# Patient Record
Sex: FEMALE | Race: WHITE | Employment: OTHER | URBAN - METROPOLITAN AREA
[De-identification: names, ages, dates, MRNs, and addresses within clinical notes are randomized per-mention and may not be internally consistent; named-entity substitution may affect disease eponyms.]

---

## 2017-02-09 PROBLEM — D49.89 MEDIASTINAL TUMOR: Status: ACTIVE | Noted: 2017-02-09

## 2017-02-09 PROBLEM — K58.9 IBS (IRRITABLE BOWEL SYNDROME): Status: ACTIVE | Noted: 2017-02-09

## 2017-02-10 PROBLEM — N18.9 CKD (CHRONIC KIDNEY DISEASE): Status: ACTIVE | Noted: 2017-02-10

## 2017-02-10 PROBLEM — E03.9 HYPOTHYROIDISM: Status: ACTIVE | Noted: 2017-02-10

## 2017-02-10 PROBLEM — R05.3 CHRONIC COUGH: Status: ACTIVE | Noted: 2017-02-10

## 2017-02-10 PROBLEM — N30.20 CHRONIC CYSTITIS: Status: ACTIVE | Noted: 2017-02-10

## 2017-02-10 PROBLEM — E78.5 HYPERLIPIDEMIA: Status: ACTIVE | Noted: 2017-02-10

## 2017-02-10 PROBLEM — I50.9 CHF (CONGESTIVE HEART FAILURE) (HCC): Chronic | Status: ACTIVE | Noted: 2017-02-10

## 2017-02-10 PROBLEM — I10 HYPERTENSION: Status: ACTIVE | Noted: 2017-02-10

## 2017-02-10 PROBLEM — D61.9 APLASTIC ANEMIA (HCC): Status: ACTIVE | Noted: 2017-02-10

## 2017-02-10 PROBLEM — J98.4 BRONCHOGENIC CYST: Status: ACTIVE | Noted: 2017-02-10

## 2017-02-10 PROBLEM — I50.9 CHF (CONGESTIVE HEART FAILURE) (HCC): Status: ACTIVE | Noted: 2017-02-10

## 2017-02-10 PROBLEM — D46.9 MYELODYSPLASIA (MYELODYSPLASTIC SYNDROME) (HCC): Status: ACTIVE | Noted: 2017-02-10

## 2020-02-13 ENCOUNTER — APPOINTMENT (OUTPATIENT)
Dept: GENERAL RADIOLOGY | Age: 85
DRG: 481 | End: 2020-02-13
Attending: EMERGENCY MEDICINE
Payer: MEDICARE

## 2020-02-13 ENCOUNTER — HOSPITAL ENCOUNTER (EMERGENCY)
Age: 85
Discharge: OTHER INSTITUTION | DRG: 481 | End: 2020-02-13
Attending: EMERGENCY MEDICINE
Payer: MEDICARE

## 2020-02-13 ENCOUNTER — HOSPITAL ENCOUNTER (INPATIENT)
Age: 85
LOS: 5 days | Discharge: NURSING, REHAB OR LONG-TERM CARE | DRG: 481 | End: 2020-02-18
Attending: INTERNAL MEDICINE | Admitting: FAMILY MEDICINE
Payer: MEDICARE

## 2020-02-13 ENCOUNTER — APPOINTMENT (OUTPATIENT)
Dept: CT IMAGING | Age: 85
DRG: 481 | End: 2020-02-13
Attending: EMERGENCY MEDICINE
Payer: MEDICARE

## 2020-02-13 VITALS
RESPIRATION RATE: 12 BRPM | BODY MASS INDEX: 21.83 KG/M2 | SYSTOLIC BLOOD PRESSURE: 142 MMHG | HEIGHT: 65 IN | HEART RATE: 66 BPM | DIASTOLIC BLOOD PRESSURE: 64 MMHG | WEIGHT: 131 LBS | OXYGEN SATURATION: 100 % | TEMPERATURE: 98.3 F

## 2020-02-13 DIAGNOSIS — S72.002A CLOSED FRACTURE OF LEFT HIP, INITIAL ENCOUNTER (HCC): Primary | ICD-10-CM

## 2020-02-13 DIAGNOSIS — S72.002A CLOSED FRACTURE OF LEFT HIP, INITIAL ENCOUNTER (HCC): ICD-10-CM

## 2020-02-13 DIAGNOSIS — D64.9 ANEMIA, UNSPECIFIED TYPE: Primary | ICD-10-CM

## 2020-02-13 PROBLEM — I50.32 CHRONIC DIASTOLIC CONGESTIVE HEART FAILURE (HCC): Status: ACTIVE | Noted: 2020-02-13

## 2020-02-13 PROBLEM — I27.20 PULMONARY HTN (HCC): Status: ACTIVE | Noted: 2020-02-13

## 2020-02-13 PROBLEM — D46.9 MDS (MYELODYSPLASTIC SYNDROME) (HCC): Status: ACTIVE | Noted: 2020-02-13

## 2020-02-13 PROBLEM — N18.30 CKD (CHRONIC KIDNEY DISEASE) STAGE 3, GFR 30-59 ML/MIN (HCC): Status: ACTIVE | Noted: 2020-02-13

## 2020-02-13 PROBLEM — R42 DIZZINESS: Status: ACTIVE | Noted: 2020-02-13

## 2020-02-13 LAB
ALBUMIN SERPL-MCNC: 3.7 G/DL (ref 3.2–4.6)
ALBUMIN/GLOB SERPL: 1 {RATIO} (ref 1.2–3.5)
ALP SERPL-CCNC: 81 U/L (ref 50–130)
ALT SERPL-CCNC: 27 U/L (ref 12–65)
ANION GAP SERPL CALC-SCNC: 6 MMOL/L (ref 7–16)
APPEARANCE UR: ABNORMAL
AST SERPL-CCNC: 29 U/L (ref 15–37)
ATRIAL RATE: 63 BPM
BACTERIA URNS QL MICRO: ABNORMAL /HPF
BASOPHILS # BLD: 0 K/UL (ref 0–0.2)
BASOPHILS NFR BLD: 0 % (ref 0–2)
BILIRUB SERPL-MCNC: 1.4 MG/DL (ref 0.2–1.1)
BILIRUB UR QL: NEGATIVE
BUN SERPL-MCNC: 37 MG/DL (ref 8–23)
CALCIUM SERPL-MCNC: 9.9 MG/DL (ref 8.3–10.4)
CALCULATED P AXIS, ECG09: 72 DEGREES
CALCULATED R AXIS, ECG10: 45 DEGREES
CALCULATED T AXIS, ECG11: 77 DEGREES
CASTS URNS QL MICRO: ABNORMAL /LPF
CHLORIDE SERPL-SCNC: 109 MMOL/L (ref 98–107)
CO2 SERPL-SCNC: 23 MMOL/L (ref 21–32)
COLOR UR: YELLOW
CREAT SERPL-MCNC: 1.49 MG/DL (ref 0.6–1)
DIAGNOSIS, 93000: NORMAL
DIFFERENTIAL METHOD BLD: ABNORMAL
EOSINOPHIL # BLD: 0 K/UL (ref 0–0.8)
EOSINOPHIL NFR BLD: 0 % (ref 0.5–7.8)
EPI CELLS #/AREA URNS HPF: ABNORMAL /HPF
ERYTHROCYTE [DISTWIDTH] IN BLOOD BY AUTOMATED COUNT: 23 % (ref 11.9–14.6)
GLOBULIN SER CALC-MCNC: 3.8 G/DL (ref 2.3–3.5)
GLUCOSE SERPL-MCNC: 90 MG/DL (ref 65–100)
GLUCOSE UR STRIP.AUTO-MCNC: NEGATIVE MG/DL
HCT VFR BLD AUTO: 23.7 % (ref 35.8–46.3)
HGB BLD-MCNC: 7.6 G/DL (ref 11.7–15.4)
HGB UR QL STRIP: NEGATIVE
IMM GRANULOCYTES # BLD AUTO: 0.1 K/UL (ref 0–0.5)
IMM GRANULOCYTES NFR BLD AUTO: 1 % (ref 0–5)
INR PPP: 1
KETONES UR QL STRIP.AUTO: NEGATIVE MG/DL
LEUKOCYTE ESTERASE UR QL STRIP.AUTO: ABNORMAL
LYMPHOCYTES # BLD: 2.5 K/UL (ref 0.5–4.6)
LYMPHOCYTES NFR BLD: 28 % (ref 13–44)
MCH RBC QN AUTO: 32.6 PG (ref 26.1–32.9)
MCHC RBC AUTO-ENTMCNC: 32.1 G/DL (ref 31.4–35)
MCV RBC AUTO: 101.7 FL (ref 79.6–97.8)
MONOCYTES # BLD: 0.5 K/UL (ref 0.1–1.3)
MONOCYTES NFR BLD: 6 % (ref 4–12)
NEUTS SEG # BLD: 5.6 K/UL (ref 1.7–8.2)
NEUTS SEG NFR BLD: 64 % (ref 43–78)
NITRITE UR QL STRIP.AUTO: NEGATIVE
NRBC # BLD: 0.02 K/UL (ref 0–0.2)
P-R INTERVAL, ECG05: 198 MS
PH UR STRIP: 5 [PH] (ref 5–9)
PLATELET # BLD AUTO: 208 K/UL (ref 150–450)
PMV BLD AUTO: 12.8 FL (ref 9.4–12.3)
POTASSIUM SERPL-SCNC: 3.9 MMOL/L (ref 3.5–5.1)
PROT SERPL-MCNC: 7.5 G/DL (ref 6.3–8.2)
PROT UR STRIP-MCNC: NEGATIVE MG/DL
PROTHROMBIN TIME: 13.6 SEC (ref 12–14.7)
Q-T INTERVAL, ECG07: 444 MS
QRS DURATION, ECG06: 90 MS
QTC CALCULATION (BEZET), ECG08: 454 MS
RBC # BLD AUTO: 2.33 M/UL (ref 4.05–5.2)
RBC #/AREA URNS HPF: ABNORMAL /HPF
SODIUM SERPL-SCNC: 138 MMOL/L (ref 136–145)
SP GR UR REFRACTOMETRY: 1.01 (ref 1–1.02)
TROPONIN I SERPL-MCNC: <0.02 NG/ML (ref 0.02–0.05)
UROBILINOGEN UR QL STRIP.AUTO: 0.2 EU/DL (ref 0.2–1)
VENTRICULAR RATE, ECG03: 63 BPM
WBC # BLD AUTO: 8.8 K/UL (ref 4.3–11.1)
WBC URNS QL MICRO: ABNORMAL /HPF

## 2020-02-13 PROCEDURE — 71045 X-RAY EXAM CHEST 1 VIEW: CPT

## 2020-02-13 PROCEDURE — 81001 URINALYSIS AUTO W/SCOPE: CPT

## 2020-02-13 PROCEDURE — 74011250637 HC RX REV CODE- 250/637: Performed by: FAMILY MEDICINE

## 2020-02-13 PROCEDURE — 96374 THER/PROPH/DIAG INJ IV PUSH: CPT

## 2020-02-13 PROCEDURE — 96375 TX/PRO/DX INJ NEW DRUG ADDON: CPT

## 2020-02-13 PROCEDURE — 74011250636 HC RX REV CODE- 250/636: Performed by: EMERGENCY MEDICINE

## 2020-02-13 PROCEDURE — 93005 ELECTROCARDIOGRAM TRACING: CPT | Performed by: EMERGENCY MEDICINE

## 2020-02-13 PROCEDURE — 85025 COMPLETE CBC W/AUTO DIFF WBC: CPT

## 2020-02-13 PROCEDURE — 74011000302 HC RX REV CODE- 302: Performed by: FAMILY MEDICINE

## 2020-02-13 PROCEDURE — 80053 COMPREHEN METABOLIC PANEL: CPT

## 2020-02-13 PROCEDURE — 85610 PROTHROMBIN TIME: CPT

## 2020-02-13 PROCEDURE — 73552 X-RAY EXAM OF FEMUR 2/>: CPT

## 2020-02-13 PROCEDURE — 84484 ASSAY OF TROPONIN QUANT: CPT

## 2020-02-13 PROCEDURE — 99285 EMERGENCY DEPT VISIT HI MDM: CPT

## 2020-02-13 PROCEDURE — 86580 TB INTRADERMAL TEST: CPT | Performed by: FAMILY MEDICINE

## 2020-02-13 PROCEDURE — 77030036696 HC BOOT TRACT BUCKS S2SG -A

## 2020-02-13 PROCEDURE — 65270000029 HC RM PRIVATE

## 2020-02-13 PROCEDURE — 70450 CT HEAD/BRAIN W/O DYE: CPT

## 2020-02-13 PROCEDURE — 73502 X-RAY EXAM HIP UNI 2-3 VIEWS: CPT

## 2020-02-13 RX ORDER — LOSARTAN POTASSIUM 25 MG/1
25 TABLET ORAL 2 TIMES DAILY
Status: DISCONTINUED | OUTPATIENT
Start: 2020-02-13 | End: 2020-02-18 | Stop reason: HOSPADM

## 2020-02-13 RX ORDER — CYANOCOBALAMIN (VITAMIN B-12) 500 MCG
2000 TABLET ORAL DAILY
Status: DISCONTINUED | OUTPATIENT
Start: 2020-02-14 | End: 2020-02-18 | Stop reason: HOSPADM

## 2020-02-13 RX ORDER — ONDANSETRON 2 MG/ML
4 INJECTION INTRAMUSCULAR; INTRAVENOUS
Status: DISCONTINUED | OUTPATIENT
Start: 2020-02-13 | End: 2020-02-14 | Stop reason: SDUPTHER

## 2020-02-13 RX ORDER — DIPHENHYDRAMINE HYDROCHLORIDE 50 MG/ML
12.5 INJECTION, SOLUTION INTRAMUSCULAR; INTRAVENOUS
Status: DISCONTINUED | OUTPATIENT
Start: 2020-02-13 | End: 2020-02-18 | Stop reason: HOSPADM

## 2020-02-13 RX ORDER — HYDROCODONE BITARTRATE AND ACETAMINOPHEN 5; 325 MG/1; MG/1
1 TABLET ORAL
Status: DISCONTINUED | OUTPATIENT
Start: 2020-02-13 | End: 2020-02-14

## 2020-02-13 RX ORDER — NITROGLYCERIN 0.4 MG/1
0.4 TABLET SUBLINGUAL AS NEEDED
Status: DISCONTINUED | OUTPATIENT
Start: 2020-02-13 | End: 2020-02-18 | Stop reason: HOSPADM

## 2020-02-13 RX ORDER — GUAIFENESIN 100 MG/5ML
81 LIQUID (ML) ORAL DAILY
Status: DISCONTINUED | OUTPATIENT
Start: 2020-02-14 | End: 2020-02-18 | Stop reason: HOSPADM

## 2020-02-13 RX ORDER — LORATADINE 10 MG/1
10 TABLET ORAL DAILY
Status: DISCONTINUED | OUTPATIENT
Start: 2020-02-14 | End: 2020-02-18 | Stop reason: HOSPADM

## 2020-02-13 RX ORDER — SODIUM CHLORIDE 0.9 % (FLUSH) 0.9 %
5-40 SYRINGE (ML) INJECTION EVERY 8 HOURS
Status: DISCONTINUED | OUTPATIENT
Start: 2020-02-13 | End: 2020-02-18 | Stop reason: SDUPTHER

## 2020-02-13 RX ORDER — ONDANSETRON 2 MG/ML
4 INJECTION INTRAMUSCULAR; INTRAVENOUS
Status: COMPLETED | OUTPATIENT
Start: 2020-02-13 | End: 2020-02-13

## 2020-02-13 RX ORDER — NALOXONE HYDROCHLORIDE 0.4 MG/ML
0.4 INJECTION, SOLUTION INTRAMUSCULAR; INTRAVENOUS; SUBCUTANEOUS AS NEEDED
Status: DISCONTINUED | OUTPATIENT
Start: 2020-02-13 | End: 2020-02-18 | Stop reason: HOSPADM

## 2020-02-13 RX ORDER — ATORVASTATIN CALCIUM 40 MG/1
80 TABLET, FILM COATED ORAL DAILY
Status: DISCONTINUED | OUTPATIENT
Start: 2020-02-14 | End: 2020-02-14

## 2020-02-13 RX ORDER — HYDROCHLOROTHIAZIDE 12.5 MG/1
12.5 CAPSULE ORAL
Status: DISCONTINUED | OUTPATIENT
Start: 2020-02-13 | End: 2020-02-14

## 2020-02-13 RX ORDER — ACETAMINOPHEN 325 MG/1
650 TABLET ORAL
Status: DISCONTINUED | OUTPATIENT
Start: 2020-02-13 | End: 2020-02-18 | Stop reason: HOSPADM

## 2020-02-13 RX ORDER — PANTOPRAZOLE SODIUM 40 MG/1
40 TABLET, DELAYED RELEASE ORAL
Status: DISCONTINUED | OUTPATIENT
Start: 2020-02-14 | End: 2020-02-14

## 2020-02-13 RX ORDER — MORPHINE SULFATE 2 MG/ML
2 INJECTION, SOLUTION INTRAMUSCULAR; INTRAVENOUS
Status: COMPLETED | OUTPATIENT
Start: 2020-02-13 | End: 2020-02-13

## 2020-02-13 RX ORDER — SODIUM CHLORIDE 0.9 % (FLUSH) 0.9 %
5-40 SYRINGE (ML) INJECTION AS NEEDED
Status: DISCONTINUED | OUTPATIENT
Start: 2020-02-13 | End: 2020-02-18 | Stop reason: SDUPTHER

## 2020-02-13 RX ORDER — MORPHINE SULFATE 2 MG/ML
1 INJECTION, SOLUTION INTRAMUSCULAR; INTRAVENOUS
Status: DISCONTINUED | OUTPATIENT
Start: 2020-02-13 | End: 2020-02-14

## 2020-02-13 RX ORDER — MONTELUKAST SODIUM 10 MG/1
10 TABLET ORAL DAILY
Status: DISCONTINUED | OUTPATIENT
Start: 2020-02-14 | End: 2020-02-18 | Stop reason: HOSPADM

## 2020-02-13 RX ORDER — LEVOTHYROXINE SODIUM 50 UG/1
50 TABLET ORAL
Status: DISCONTINUED | OUTPATIENT
Start: 2020-02-14 | End: 2020-02-18 | Stop reason: HOSPADM

## 2020-02-13 RX ADMIN — Medication 5 ML: at 22:05

## 2020-02-13 RX ADMIN — TUBERCULIN PURIFIED PROTEIN DERIVATIVE 5 UNITS: 5 INJECTION, SOLUTION INTRADERMAL at 18:35

## 2020-02-13 RX ADMIN — LOSARTAN POTASSIUM 25 MG: 25 TABLET ORAL at 19:47

## 2020-02-13 RX ADMIN — SODIUM CHLORIDE 1000 ML: 900 INJECTION, SOLUTION INTRAVENOUS at 15:58

## 2020-02-13 RX ADMIN — MORPHINE SULFATE 2 MG: 2 INJECTION, SOLUTION INTRAMUSCULAR; INTRAVENOUS at 15:58

## 2020-02-13 RX ADMIN — Medication 5 ML: at 18:36

## 2020-02-13 RX ADMIN — ONDANSETRON 4 MG: 2 INJECTION INTRAMUSCULAR; INTRAVENOUS at 15:58

## 2020-02-13 RX ADMIN — HYDROCODONE BITARTRATE AND ACETAMINOPHEN 1 TABLET: 5; 325 TABLET ORAL at 22:52

## 2020-02-13 NOTE — PROGRESS NOTES
TRANSFER - IN REPORT:    Verbal report received from Laina Beckett RN(name) on Birdie Bartlett  being received from [facility] ED(unit) for routine progression of care      Report consisted of patients Situation, Background, Assessment and   Recommendations(SBAR). Information from the following report(s) SBAR, Kardex, ED Summary, MAR and Accordion was reviewed with the receiving nurse. Opportunity for questions and clarification was provided. Assessment completed upon patients arrival to unit and care assumed.

## 2020-02-13 NOTE — H&P
Hospitalist H&P Note     Admit Date:  No admission date for patient encounter. Name:  Daniel Barr   Age:  82 y.o.  :  1935   MRN:  [id number]   PCP:  Corey Herrera MD  Treatment Team: Attending Provider: Oscar Howard MD  Dizziness, history of fall, left hip pain  HPI:   16-year-old  female patient with a known history of multiple dysplastic syndrome on ARANESP every 2 to 3 weeks, recent dose today at the infusion clinic, known history of bronchogenic cyst causing chronic cough, recent echo on 2020 showed severe pulmonary hypertension and grade 2 diastolic dysfunction with pseudonormalization, hypertension, hyperlipidemia, hypothyroidism, history of peptic ulcer, CKD stage III, chronic allergies and chronic back pain/lumbar spinal stenosis. Of note recent pneumonia about 4 to 6 weeks prior. Patient was at the infusion clinic, got her dose of Aranesp, got up walked about 10-15 steps, suddenly started this dizzy feeling, spinning sensation, had a fall, fell on her left hip, complaining of severe pain unable to get up on her own. Patient was brought to emergency room for further evaluation. Patient otherwise did not complain of any headache or any chest pain or any shortness of breath or any nausea vomiting abdominal pain. Does not complain of any similar episodes prior. Few weeks ago noticed sudden giveaway of her left lower extremity but no dizziness. EKG sinus rhythm with premature atrial complexes. Troponin 0.02, creatinine of 1.49, GFR of 36, total bili of 1.4. CT head no acute etiology. Chest x-ray-Nonspecific mild diffuse interstitial prominence  X-ray left hip-  IMPRESSION  Impression: Findings suspicious for an acute nondisplaced left femoral neck  subcapital fracture, although not seen on all views. If clinically indicated,  consider MRI of the left hip for further evaluation.     ER physician spoke to Dr. Thomas Bain orthopedic physician on call, patient will be transferred to [facility] for further work-up for left hip fracture. 10 systems reviewed and negative except as noted in HPI. Past Medical History:   Diagnosis Date    Aplastic anemia ([facility] 2/10/2017    Asthma     Bronchogenic cyst 2/10/2017    --DR Kunal Bui    CHF (congestive heart failure) (HCC) 2/10/2017    Chronic cough 2/10/2017    Chronic cystitis 2/10/2017    --UROLOGIST - DR GALO    Hyperlipidemia 2/10/2017    Hypertension 2/10/2017    Hypothyroidism 2/10/2017    IBS (irritable bowel syndrome) 2/9/2017    --DIVERTICULOSIS - [facility]  - DR Dayron Coronado    Mediastinal tumor 2/9/2017    --S/P RESECTION  - SEEN CARDIOLOGIST - STRESS TEST - NORMAL 2012    [facility] 2/10[facility]2017    --SEES HEMONC    Pneumonia     WITH MRSA / EMPYEMA /SEPSIS/ DEHYDRATION / RICHAR - RESOLVED WAS IN REHAB FOR A WHILE      Past Surgical History:   Procedure Laterality Date    HX CHOLECYSTECTOMY      HX CYST REMOVAL      MEDIASTINAL TUMOR  - BENIGN    HX HERNIA REPAIR      HX HYSTERECTOMY      HX OTHER SURGICAL Left     STAPEDECTOMY     HX TONSILLECTOMY        Allergies   Allergen Reactions    Bactrim [Sulfamethoprim] Rash    Levaquin [Levofloxacin] Palpitations    Tramadol Unknown (comments)      Social History     Tobacco Use    Smoking status: Never Smoker    Smokeless tobacco: Never Used   Substance Use Topics    Alcohol use: No      Family History   Problem Relation Age of Onset    Cancer Mother         BREAST    Heart Disease Father     Diabetes Brother     Diabetes Other         SON; DAUGHTER      Immunization History   Administered Date(s) Administered    Influenza Vaccine 10/06/2014, 10/26/2015     PTA Medications:  Cannot display prior to admission medications because the patient has not been admitted in this contact. Objective:   No data found.      No intake or output data in the 24 hours ending 02/13/20 3310 Physical Exam:  General:    Well nourished. Alert. Eyes:   Normal sclera. Extraocular movements intact. ENT:  Normocephalic, atraumatic. Moist mucous membranes  CV:   RRR. No murmur, rub, or gallop. Lungs:  CTAB. No wheezing, rhonchi, or rales. Abdomen: Soft, nontender, nondistended. Bowel sounds normal.   Extremities: Warm and dry. No cyanosis or edema. Point tenderness over the left hip region, able to do flexion at the left hip with significant pain. Normal range of movements of all other extremities . neurologic: CN II-XII grossly intact. Sensation intact. Skin:     No rashes or jaundice. Psych:  Normal mood and affect. I reviewed the labs, imaging, EKGs, telemetry, and other studies done this admission.   Data Review:   Recent Results (from the past 24 hour(s))   EKG, 12 LEAD, INITIAL    Collection Time: 02/13/20  3:34 PM   Result Value Ref Range    Ventricular Rate 63 BPM    Atrial Rate 63 BPM    P-R Interval 198 ms    QRS Duration 90 ms    Q-T Interval 444 ms    QTC Calculation (Bezet) 454 ms    Calculated P Axis 72 degrees    Calculated R Axis 45 degrees    Calculated T Axis 77 degrees    Diagnosis       Sinus rhythm with Premature atrial complexes  Otherwise normal ECG  When compared with ECG of 14-OCT-2003 14:53,  Premature atrial complexes are now Present  Confirmed by Bo Tinsley MD (), TERRY COLON ([id number]) on 2/13/2020 5:02:34 PM     TROPONIN I    Collection Time: 02/13/20  3:46 PM   Result Value Ref Range    Troponin-I, Qt. <0.02 (L) 0.02 - 0.05 NG/ML   CBC WITH AUTOMATED DIFF    Collection Time: 02/13/20  3:46 PM   Result Value Ref Range    WBC 8.8 4.3 - 11.1 K/uL    RBC 2.33 (L) 4.05 - 5.2 M/uL    HGB 7.6 (L) 11.7 - 15.4 g/dL    HCT 23.7 (L) 35.8 - 46.3 %    .7 (H) 79.6 - 97.8 FL    MCH 32.6 26.1 - 32.9 PG    MCHC 32.1 31.4 - 35.0 g/dL    RDW 23.0 (H) 11.9 - 14.6 %    PLATELET 326 105 - 888 K/uL    MPV 12.8 (H) 9.4 - 12.3 FL    ABSOLUTE NRBC 0.02 0.0 - 0.2 K/uL    DF AUTOMATED      NEUTROPHILS 64 43 - 78 %    LYMPHOCYTES 28 13 - 44 %    MONOCYTES 6 4.0 - 12.0 %    EOSINOPHILS 0 (L) 0.5 - 7.8 %    BASOPHILS 0 0.0 - 2.0 %    IMMATURE GRANULOCYTES 1 0.0 - 5.0 %    ABS. NEUTROPHILS 5.6 1.7 - 8.2 K/UL    ABS. LYMPHOCYTES 2.5 0.5 - 4.6 K/UL    ABS. MONOCYTES 0.5 0.1 - 1.3 K/UL    ABS. EOSINOPHILS 0.0 0.0 - 0.8 K/UL    ABS. BASOPHILS 0.0 0.0 - 0.2 K/UL    ABS. IMM. GRANS. 0.1 0.0 - 0.5 K/UL   METABOLIC PANEL, COMPREHENSIVE    Collection Time: 02/13/20  3:46 PM   Result Value Ref Range    Sodium 138 136 - 145 mmol/L    Potassium 3.9 3.5 - 5.1 mmol/L    Chloride 109 (H) 98 - 107 mmol/L    CO2 23 21 - 32 mmol/L    Anion gap 6 (L) 7 - 16 mmol/L    Glucose 90 65 - 100 mg/dL    BUN 37 (H) 8 - 23 MG/DL    Creatinine 1.49 (H) 0.6 - 1.0 MG/DL    GFR est AA 43 (L) >60 ml/min/1.73m2    GFR est non-AA 36 (L) >60 ml/min/1.73m2    Calcium 9.9 8.3 - 10.4 MG/DL    Bilirubin, total 1.4 (H) 0.2 - 1.1 MG/DL    ALT (SGPT) 27 12 - 65 U/L    AST (SGOT) 29 15 - 37 U/L    Alk.  phosphatase 81 50 - 130 U/L    Protein, total 7.5 6.3 - 8.2 g/dL    Albumin 3.7 3.2 - 4.6 g/dL    Globulin 3.8 (H) 2.3 - 3.5 g/dL    A-G Ratio 1.0 (L) 1.2 - 3.5     PROTHROMBIN TIME + INR    Collection Time: 02/13/20  3:46 PM   Result Value Ref Range    Prothrombin time 13.6 12.0 - 14.7 sec    INR 1.0     URINALYSIS W/ RFLX MICROSCOPIC    Collection Time: 02/13/20  4:55 PM   Result Value Ref Range    Color YELLOW      Appearance CLOUDY      Specific gravity 1.010 1.001 - 1.023      pH (UA) 5.0 5.0 - 9.0      Protein NEGATIVE  NEG mg/dL    Glucose NEGATIVE  mg/dL    Ketone NEGATIVE  NEG mg/dL    Bilirubin NEGATIVE  NEG      Blood NEGATIVE  NEG      Urobilinogen 0.2 0.2 - 1.0 EU/dL    Nitrites NEGATIVE  NEG      Leukocyte Esterase SMALL (A) NEG      WBC 20-50 0 /hpf    RBC 0-3 0 /hpf    Epithelial cells 0-3 0 /hpf    Bacteria 1+ (H) 0 /hpf    Casts 5-10 0 /lpf       All Micro Results     None          Other Studies:  Xr Hip Lt W Or Wo Pelv 2-3 Vws    Result Date: 2/13/2020  Exam:  AP pelvis and left hip, left femur radiographs History:  pain, fall, 84 years Female Comparison: None available Findings: Apparent cortical step-off is seen of the left femoral head neck junctions superiorly, although not well visualized on the crosstable lateral view, this appearance is suspicious for an acute nondisplaced left femoral neck subcapital fracture. Normal alignment, joint spaces preserved. The visualized distal left femur appears intact. Normal mineralization. Calcific atherosclerosis. No evidence of knee joint effusion. Visualized soft tissues otherwise unremarkable. Impression: Findings suspicious for an acute nondisplaced left femoral neck subcapital fracture, although not seen on all views. If clinically indicated, consider MRI of the left hip for further evaluation. Xr Femur Lt 2 V    Result Date: 2/13/2020  Exam:  AP pelvis and left hip, left femur radiographs History:  pain, fall, 84 years Female Comparison: None available Findings: Apparent cortical step-off is seen of the left femoral head neck junctions superiorly, although not well visualized on the crosstable lateral view, this appearance is suspicious for an acute nondisplaced left femoral neck subcapital fracture. Normal alignment, joint spaces preserved. The visualized distal left femur appears intact. Normal mineralization. Calcific atherosclerosis. No evidence of knee joint effusion. Visualized soft tissues otherwise unremarkable. Impression: Findings suspicious for an acute nondisplaced left femoral neck subcapital fracture, although not seen on all views. If clinically indicated, consider MRI of the left hip for further evaluation.      Ct Head Wo Cont    Result Date: 2/13/2020  Examination: CT scan of the brain without contrast. History: fall, 84 years Female PT was at [facility] getting an infusion and when she was walking out of treatment room she got lightheaded and fell. Pt c/o pain in left hip, she is unable to bear weight Technique: 5 mm axial imaging of the brain from the posterior fossa to the vertex. All CT scans at this location are performed using dose modulation techniques as appropriate to a performed exam including the following: automated exposure control; adjustment of the mA and/or kV according to patient's size (this includes techniques or standardized protocols for targeted exams where dose is matched to indication/reason for exam; i.e. extremities or head); use of iterative reconstruction technique. Comparison:  None available Findings: The brain parenchyma appears within normal limits for age. The ventricles, sulci are age-appropriate. No intracranial hemorrhage or extra-axial collection is identified. No evidence of acute infarct. No mass effect or midline shift is present. Basal cisterns are intact. The visualized paranasal sinuses and mastoid air cells are clear. The orbits, bones, and soft tissues are normal in appearance. Impression:  Normal unenhanced CT of the brain for age. No acute intracranial abnormality. Xr Chest Port    Result Date: 2/13/2020  AP chest radiograph History: cough, 84 years Female Comparison: None available Findings:   Normal cardiomediastinal silhouette. Nonspecific mild diffuse interstitial prominence. Mild subsegmental atelectasis bilateral lung bases. No evidence of pneumothorax, pleural effusion, or air space consolidation. Evidence of cholecystectomy. Visualized soft tissue and osseous structures otherwise unremarkable. Impression:   Nonspecific mild diffuse interstitial prominence.         Assessment and Plan:     Hospital Problems as of 2/13/2020 Never Reviewed          Codes Class Noted - Resolved POA    M[facility]od[facility]plastic [facility] ICD-10-CM: D46.9  ICD-9-CM: 238.75  2/13/2020 - Present Unknown        * (Principal) Closed left hip fracture ([facility] ICD-10-CM: T99.389R  ICD-9-CM: 820.8  2/13/2020 - Present Unknown        CKD (chronic kidney disease) stage 3, GFR 30-59 ml/min (HCC) ICD-10-CM: N18.3  ICD-9-CM: 585.3  2/13/2020 - Present Unknown        Pulmonary HTN (HCC) ICD-10-CM: I27.20  ICD-9-CM: 416.8  2/13/2020 - Present Unknown        Chronic diastolic congestive heart failure (HCC) ICD-10-CM: I50.32  ICD-9-CM: 428.32, 428.0  2/13/2020 - Present Unknown        Hypothyroidism ICD-10-CM: E03.9  ICD-9-CM: 244.9  2/10/2017 - Present Yes        Hyperlipidemia ICD-10-CM: E78.5  ICD-9-CM: 272.4  2/10/2017 - Present Yes        Hypertension ICD-10-CM: I10  ICD-9-CM: 401.9  2/10/2017 - Present Yes        Chronic cough ICD-10-CM: R05  ICD-9-CM: 786.2  2/10/2017 - Present Yes              PLAN:  -Dizziness-? Etiology-we will order cardiac enzymes every 4x3, had a recent echo on 1/6/2020 hence will hold off on the echo. We will also consulted cardiology for surgical clearance.  -History of fall-left hip fracture-Dr. Shayne Barnes was consulted by the ER physician, PRN pain medication, further recommendations as per Dr. Shayne Barnes. -CKD stage III-looking at her previous kidney functions in care everywhere, at her baseline, will hold hydrochlorothiazide, continue losartan. -Recent echo on 1/6/2020 with grade 2 diastolic dysfunction and severe pulmonary hypertension.  -Myelodysplastic syndrome for which she is on Aranesp every 3 weeks. Anemia probably second to her myelodysplastic syndrome.  -Hypertension  -Hypothyroidism  -Chronic cough secondary to bronchogenic cyst(recent treatment for pneumonia about 4 to 6 weeks prior)  -Peptic ulcer-continue Protonix  -Chronic allergies-continue loratadine. Patient also takes cetirizine at home. Advance life care discussed with patient in presence of granddaughter, patient is full code.     DVT ppx: SCD  Anticipated DC needs:    Code status:  Full  Estimated LOS:  Greater than 2 midnights  Risk:  high    Signed:  Jennifer Woo MD

## 2020-02-13 NOTE — PROGRESS NOTES
02/13/20 1812   Dual Skin Pressure Injury Assessment   Dual Skin Pressure Injury Assessment WDL   Second Care Provider (Based on Facility Policy) ALFRED Larkin   Skin Integumentary   Skin Integumentary (WDL) X   Skin Color Appropriate for ethnicity; Ecchymosis (comment)  (BLE)   Skin Condition/Temp Other (comment);Dry   Skin Integrity Intact;Scars (comment)   Turgor Epidermis thin w/ loss of subcut tissue   Hair Growth Sparce   Varicosities Present   Wound Prevention and Protection Methods   Orientation of Wound Prevention Posterior   Location of Wound Prevention Sacrum/Coccyx   Dressing Present  No   Wound Offloading (Prevention Methods) Bed, pressure reduction mattress;Pillows;Repositioning;Turning

## 2020-02-13 NOTE — ED NOTES
TRANSFER - OUT REPORT:    Verbal report given to Candy Bach RN (name) on David Clark  being transferred to [address] [place] [address] (unit) for routine progression of care       Report consisted of patients Situation, Background, Assessment and   Recommendations(SBAR). Information from the following report(s) SBAR was reviewed with the receiving nurse. Lines:       Opportunity for questions and clarification was provided.       Patient transported with:   Tech via [employer]

## 2020-02-13 NOTE — PROGRESS NOTES
EMILY met with the patient's daughter Destiny [facility] ([phone]) who states that the patient goes between her home and her sister's [facility]. [address] [place] [address] states that the patient uses a walker or rollator to ambulate, does not require ADL assistance, and has had several falls which she attributes to being dizzy. Patient was seen at [facility] a few weeks ago and was referred home health which she's currently receiving. [address] [place] [address] in unsure of which agency. [address] [place] [address] denies any additional needs, just requested guidance on assisted living. SW provided a list of local facilities as well as [facility]. EMILY also provided contact information for [employer]. Patient anticipated to admit and potentially transfer DT. EMILY following.      Aurora Herron    [address] [facility]  [email or web address]

## 2020-02-13 NOTE — ED PROVIDER NOTES
Patient with myelodysplastic anemia. Was at the cancer center getting an injection earlier today. Afterwards while walking outside she became dizzy lightheaded and fell to the ground. States her left leg gave out. Now has pain in the left hip and leg. Unable to get up. Family brought her here to the ER. She did not hit her head or lose consciousness. Pt reports numbness to left leg but has pain on palpation. The history is provided by the patient. No  was used. Fall   The accident occurred less than 1 hour ago. The fall occurred while walking. She fell from a height of ground level. She landed on hard floor. There was no blood loss. The point of impact was the left hip. The pain is present in the left hip. The pain is moderate. She was not ambulatory at the scene. Associated symptoms include numbness. Pertinent negatives include no visual change, no fever, no abdominal pain, no bowel incontinence, no nausea, no vomiting, no hematuria, no headaches, no loss of consciousness, no tingling and no laceration. The risk factors include being elderly. The symptoms are aggravated by pressure on injury and use of injured limb. She has tried nothing for the symptoms.         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    --S/P RESECTION  - SEEN CARDIOLOGIST - STRESS TEST - NORMAL 2012    Myelodysplasia (myelodysplastic syndrome) ([address] 2/10/2017    --SEES HEMONC    Pneumonia     WITH MRSA / EMPYEMA /SEPSIS/ DEHYDRATION / RICHAR - RESOLVED WAS IN REHAB FOR A WHILE       Past Surgical History:   Procedure Laterality Date    HX CHOLECYSTECTOMY      HX CYST REMOVAL      MEDIASTINAL TUMOR  - BENIGN    HX HERNIA REPAIR      HX HYSTERECTOMY      HX OTHER SURGICAL Left     STAPEDECTOMY     HX TONSILLECTOMY           Family History:   Problem Relation Age of Onset    Cancer Mother         BREAST    Heart Disease Father     Diabetes Brother     Diabetes Other         SON; DAUGHTER       Social History     Socioeconomic History    Marital status:      Spouse name: Not on file    Number of children: Not on file    Years of education: Not on file    Highest education level: Not on file   Occupational History    Not on file   Social Needs    Financial resource strain: Not on file    Food insecurity:     Worry: Not on file     Inability: Not on file    Transportation needs:     Medical: Not on file     Non-medical: Not on file   Tobacco Use    Smoking status: Never Smoker    Smokeless tobacco: Never Used   Substance and Sexual Activity    Alcohol use: No    Drug use: Never    Sexual activity: Not on file   Lifestyle    Physical activity:     Days per week: Not on file     Minutes per session: Not on file    Stress: Not on file   Relationships    Social connections:     Talks on phone: Not on file     Gets together: Not on file     Attends Baptism service: Not on file     Active member of club or organization: Not on file     Attends meetings of clubs or organizations: Not on file     Relationship status: Not on file    Intimate partner violence:     Fear of current or ex partner: Not on file     Emotionally abused: Not on file     Physically abused: Not on file     Forced sexual activity: Not on file   Other Topics Concern    Not on file   Social History Narrative    Not on file         ALLERGIES: Bactrim [sulfamethoprim]; Levaquin [levofloxacin]; and Tramadol    Review of Systems   Constitutional: Negative for chills and fever.    HENT: Negative for rhinorrhea and sore throat. Eyes: Negative for pain, redness and visual disturbance. Respiratory: Negative for chest tightness, shortness of breath and wheezing. Cardiovascular: Negative for chest pain and leg swelling. Gastrointestinal: Negative for abdominal pain, bowel incontinence, diarrhea, nausea and vomiting. Genitourinary: Negative for dysuria and hematuria. Musculoskeletal: Positive for arthralgias. Negative for back pain, gait problem, neck pain and neck stiffness. Skin: Negative for color change and rash. Neurological: Positive for light-headedness and numbness. Negative for tingling, loss of consciousness, weakness and headaches. Vitals:    02/13/20 1331 02/13/20 1401   BP: 121/65 122/60   Pulse: 67 81   Resp: 16 12   Temp: 97.8 °F (36.6 °C) 98.8 °F (37.1 °C)   SpO2: 98% 99%   Weight: 59.4 kg (131 lb)    Height: 5' 5\" (1.651 m)             Physical Exam  Constitutional:       Appearance: Normal appearance. She is well-developed. HENT:      Head: Normocephalic and atraumatic. Eyes:      Extraocular Movements: Extraocular movements intact. Pupils: Pupils are equal, round, and reactive to light. Neck:      Musculoskeletal: Normal range of motion and neck supple. Cardiovascular:      Rate and Rhythm: Normal rate and regular rhythm. Pulmonary:      Effort: Pulmonary effort is normal.      Breath sounds: Normal breath sounds. Abdominal:      General: Bowel sounds are normal.      Palpations: Abdomen is soft. Tenderness: There is no abdominal tenderness. Musculoskeletal: Normal range of motion. General: Tenderness (left hip down leg. LrOM due to the pain. distal pulse and sensation intact. ) present. Right lower leg: No edema. Left lower leg: No edema. Skin:     General: Skin is warm and dry. Findings: No laceration. Neurological:      Mental Status: She is alert and oriented to person, place, and time.       Cranial Nerves: No cranial nerve deficit. MDM  Number of Diagnoses or Management Options  Diagnosis management comments: Patient with myelodysplastic syndrome. John Durand earlier today after an infusion at the cancer center. Injured her left hip. X-ray shows suspicion for nondisplaced left femoral neck subcapital fracture. Spoke with Ortho who request patient be transferred downtown to the hospitalist service where they can do surgery. Amount and/or Complexity of Data Reviewed  Clinical lab tests: ordered and reviewed  Tests in the radiology section of CPT®: ordered and reviewed  Tests in the medicine section of CPT®: ordered and reviewed    Patient Progress  Patient progress: stable         Procedures    EKG: normal sinus rhythm, nonspecific ST and T waves changes. Rate 63. XR CHEST PORT (Final result)   Result time 02/13/20 14:49:43   Final result by Ashia Weems MD (02/13/20 14:49:43)                Impression:    Impression:   Nonspecific mild diffuse interstitial prominence.              Narrative:    AP chest radiograph    History: cough, 84 years Female    Comparison: None available    Findings:   Normal cardiomediastinal silhouette.  Nonspecific mild diffuse  interstitial prominence.  Mild subsegmental atelectasis bilateral lung bases. No evidence of pneumothorax, pleural effusion, or air space consolidation. Evidence of cholecystectomy.  Visualized soft tissue and osseous structures  otherwise unremarkable.                      XR HIP LT W OR WO PELV 2-3 VWS (Final result)   Result time 02/13/20 14:52:33   Final result by Ashia Weems MD (02/13/20 14:52:33)                Impression:    Impression: Findings suspicious for an acute nondisplaced left femoral neck  subcapital fracture, although not seen on all views.  If clinically indicated,  consider MRI of the left hip for further evaluation.               Narrative:    Exam:  AP pelvis and left hip, left femur radiographs    History:  pain, fall, 82 years Female    Comparison: None available    Findings: Apparent cortical step-off is seen of the left femoral head neck  junctions superiorly, although not well visualized on the crosstable lateral  view, this appearance is suspicious for an acute nondisplaced left femoral neck  subcapital fracture.  Normal alignment, joint spaces preserved.  The visualized  distal left femur appears intact.  Normal mineralization.  Calcific  atherosclerosis.  No evidence of knee joint effusion.  Visualized soft tissues  otherwise unremarkable.                    XR FEMUR LT 2 V (Final result)   Result time 02/13/20 14:52:33   Final result by Lisbeth Vaughan MD (02/13/20 14:52:33)                Impression:    Impression: Findings suspicious for an acute nondisplaced left femoral neck  subcapital fracture, although not seen on all views.  If clinically indicated,  consider MRI of the left hip for further evaluation.               Narrative:    Exam:  AP pelvis and left hip, left femur radiographs    History:  pain, fall, 82 years Female    Comparison: None available    Findings: Apparent cortical step-off is seen of the left femoral head neck  junctions superiorly, although not well visualized on the crosstable lateral  view, this appearance is suspicious for an acute nondisplaced left femoral neck  subcapital fracture.  Normal alignment, joint spaces preserved.  The visualized  distal left femur appears intact.  Normal mineralization.  Calcific  atherosclerosis.  No evidence of knee joint effusion.  Visualized soft tissues  otherwise unremarkable.                    Results Include:    Recent Results (from the past 24 hour(s))   TROPONIN I    Collection Time: 02/13/20  3:46 PM   Result Value Ref Range    Troponin-I, Qt. <0.02 (L) 0.02 - 0.05 NG/ML   CBC WITH AUTOMATED DIFF    Collection Time: 02/13/20  3:46 PM   Result Value Ref Range    WBC 8.8 4.3 - 11.1 K/uL    RBC 2.33 (L) 4.05 - 5.2 M/uL    HGB 7.6 (L) 11.7 - 15.4 g/dL    HCT 23.7 (L) 35.8 - 46.3 %    .7 (H) 79.6 - 97.8 FL    MCH 32.6 26.1 - 32.9 PG    MCHC 32.1 31.4 - 35.0 g/dL    RDW 23.0 (H) 11.9 - 14.6 %    PLATELET 781 811 - 782 K/uL    MPV 12.8 (H) 9.4 - 12.3 FL    ABSOLUTE NRBC 0.02 0.0 - 0.2 K/uL    DF AUTOMATED      NEUTROPHILS 64 43 - 78 %    LYMPHOCYTES 28 13 - 44 %    MONOCYTES 6 4.0 - 12.0 %    EOSINOPHILS 0 (L) 0.5 - 7.8 %    BASOPHILS 0 0.0 - 2.0 %    IMMATURE GRANULOCYTES 1 0.0 - 5.0 %    ABS. NEUTROPHILS 5.6 1.7 - 8.2 K/UL    ABS. LYMPHOCYTES 2.5 0.5 - 4.6 K/UL    ABS. MONOCYTES 0.5 0.1 - 1.3 K/UL    ABS. EOSINOPHILS 0.0 0.0 - 0.8 K/UL    ABS. BASOPHILS 0.0 0.0 - 0.2 K/UL    ABS. IMM. GRANS. 0.1 0.0 - 0.5 K/UL   METABOLIC PANEL, COMPREHENSIVE    Collection Time: 02/13/20  3:46 PM   Result Value Ref Range    Sodium 138 136 - 145 mmol/L    Potassium 3.9 3.5 - 5.1 mmol/L    Chloride 109 (H) 98 - 107 mmol/L    CO2 23 21 - 32 mmol/L    Anion gap 6 (L) 7 - 16 mmol/L    Glucose 90 65 - 100 mg/dL    BUN 37 (H) 8 - 23 MG/DL    Creatinine 1.49 (H) 0.6 - 1.0 MG/DL    GFR est AA 43 (L) >60 ml/min/1.73m2    GFR est non-AA 36 (L) >60 ml/min/1.73m2    Calcium 9.9 8.3 - 10.4 MG/DL    Bilirubin, total 1.4 (H) 0.2 - 1.1 MG/DL    ALT (SGPT) 27 12 - 65 U/L    AST (SGOT) 29 15 - 37 U/L    Alk.  phosphatase 81 50 - 130 U/L    Protein, total 7.5 6.3 - 8.2 g/dL    Albumin 3.7 3.2 - 4.6 g/dL    Globulin 3.8 (H) 2.3 - 3.5 g/dL    A-G Ratio 1.0 (L) 1.2 - 3.5     PROTHROMBIN TIME + INR    Collection Time: 02/13/20  3:46 PM   Result Value Ref Range    Prothrombin time 13.6 12.0 - 14.7 sec    INR 1.0

## 2020-02-13 NOTE — ED TRIAGE NOTES
PT was at [facility] getting an infusion and when she was walking out of treatment room she got lightheaded and fell. Pt c/o pain in left hip, she is unable to bear weight.

## 2020-02-14 ENCOUNTER — APPOINTMENT (OUTPATIENT)
Dept: GENERAL RADIOLOGY | Age: 85
DRG: 481 | End: 2020-02-14
Attending: ORTHOPAEDIC SURGERY
Payer: MEDICARE

## 2020-02-14 ENCOUNTER — ANESTHESIA EVENT (OUTPATIENT)
Dept: SURGERY | Age: 85
DRG: 481 | End: 2020-02-14
Payer: MEDICARE

## 2020-02-14 ENCOUNTER — ANESTHESIA (OUTPATIENT)
Dept: SURGERY | Age: 85
DRG: 481 | End: 2020-02-14
Payer: MEDICARE

## 2020-02-14 LAB
ANION GAP SERPL CALC-SCNC: 9 MMOL/L (ref 7–16)
BASOPHILS # BLD: 0 K/UL (ref 0–0.2)
BASOPHILS # BLD: 0 K/UL (ref 0–0.2)
BASOPHILS NFR BLD: 0 % (ref 0–2)
BASOPHILS NFR BLD: 0 % (ref 0–2)
BUN SERPL-MCNC: 34 MG/DL (ref 8–23)
CALCIUM SERPL-MCNC: 9.1 MG/DL (ref 8.3–10.4)
CHLORIDE SERPL-SCNC: 109 MMOL/L (ref 98–107)
CO2 SERPL-SCNC: 24 MMOL/L (ref 21–32)
CREAT SERPL-MCNC: 1.38 MG/DL (ref 0.6–1)
DIFFERENTIAL METHOD BLD: ABNORMAL
DIFFERENTIAL METHOD BLD: ABNORMAL
EOSINOPHIL # BLD: 0.1 K/UL (ref 0–0.8)
EOSINOPHIL # BLD: 0.1 K/UL (ref 0–0.8)
EOSINOPHIL NFR BLD: 1 % (ref 0.5–7.8)
EOSINOPHIL NFR BLD: 1 % (ref 0.5–7.8)
ERYTHROCYTE [DISTWIDTH] IN BLOOD BY AUTOMATED COUNT: 21 % (ref 11.9–14.6)
ERYTHROCYTE [DISTWIDTH] IN BLOOD BY AUTOMATED COUNT: 22.4 % (ref 11.9–14.6)
FERRITIN SERPL-MCNC: 1651 NG/ML (ref 8–388)
FOLATE SERPL-MCNC: 16.8 NG/ML (ref 3.1–17.5)
GLUCOSE SERPL-MCNC: 90 MG/DL (ref 65–100)
HCT VFR BLD AUTO: 22.4 % (ref 35.8–46.3)
HCT VFR BLD AUTO: 27.9 % (ref 35.8–46.3)
HGB BLD-MCNC: 7.1 G/DL (ref 11.7–15.4)
HGB BLD-MCNC: 8.9 G/DL (ref 11.7–15.4)
IMM GRANULOCYTES # BLD AUTO: 0 K/UL (ref 0–0.5)
IMM GRANULOCYTES # BLD AUTO: 0.1 K/UL (ref 0–0.5)
IMM GRANULOCYTES NFR BLD AUTO: 1 % (ref 0–5)
IMM GRANULOCYTES NFR BLD AUTO: 2 % (ref 0–5)
IRON SATN MFR SERPL: 42 %
IRON SERPL-MCNC: 95 UG/DL (ref 35–150)
LYMPHOCYTES # BLD: 2.3 K/UL (ref 0.5–4.6)
LYMPHOCYTES # BLD: 2.5 K/UL (ref 0.5–4.6)
LYMPHOCYTES NFR BLD: 36 % (ref 13–44)
LYMPHOCYTES NFR BLD: 49 % (ref 13–44)
MCH RBC QN AUTO: 32 PG (ref 26.1–32.9)
MCH RBC QN AUTO: 32.4 PG (ref 26.1–32.9)
MCHC RBC AUTO-ENTMCNC: 31.7 G/DL (ref 31.4–35)
MCHC RBC AUTO-ENTMCNC: 31.9 G/DL (ref 31.4–35)
MCV RBC AUTO: 100.4 FL (ref 79.6–97.8)
MCV RBC AUTO: 102.3 FL (ref 79.6–97.8)
MM INDURATION POC: 0 MM (ref 0–5)
MONOCYTES # BLD: 0.4 K/UL (ref 0.1–1.3)
MONOCYTES # BLD: 0.5 K/UL (ref 0.1–1.3)
MONOCYTES NFR BLD: 10 % (ref 4–12)
MONOCYTES NFR BLD: 7 % (ref 4–12)
NEUTS SEG # BLD: 2 K/UL (ref 1.7–8.2)
NEUTS SEG # BLD: 3.5 K/UL (ref 1.7–8.2)
NEUTS SEG NFR BLD: 39 % (ref 43–78)
NEUTS SEG NFR BLD: 54 % (ref 43–78)
NRBC # BLD: 0 K/UL (ref 0–0.2)
NRBC # BLD: 0.02 K/UL (ref 0–0.2)
PLATELET # BLD AUTO: 133 K/UL (ref 150–450)
PLATELET # BLD AUTO: 183 K/UL (ref 150–450)
PLATELET COMMENTS,PCOM: ADEQUATE
PMV BLD AUTO: 13 FL (ref 9.4–12.3)
PMV BLD AUTO: 13.5 FL (ref 9.4–12.3)
POTASSIUM SERPL-SCNC: 3.6 MMOL/L (ref 3.5–5.1)
PPD POC: NEGATIVE NEGATIVE
RBC # BLD AUTO: 2.19 M/UL (ref 4.05–5.2)
RBC # BLD AUTO: 2.78 M/UL (ref 4.05–5.2)
RBC MORPH BLD: ABNORMAL
SODIUM SERPL-SCNC: 142 MMOL/L (ref 136–145)
TIBC SERPL-MCNC: 228 UG/DL (ref 250–450)
TRANSFERRIN SERPL-MCNC: 175 MG/DL (ref 202–364)
TROPONIN I SERPL-MCNC: <0.02 NG/ML (ref 0.02–0.05)
TROPONIN I SERPL-MCNC: <0.02 NG/ML (ref 0.02–0.05)
VIT B12 SERPL-MCNC: 562 PG/ML (ref 193–986)
WBC # BLD AUTO: 5.1 K/UL (ref 4.3–11.1)
WBC # BLD AUTO: 6.4 K/UL (ref 4.3–11.1)
WBC MORPH BLD: ABNORMAL

## 2020-02-14 PROCEDURE — 0QH734Z INSERTION OF INTERNAL FIXATION DEVICE INTO LEFT UPPER FEMUR, PERCUTANEOUS APPROACH: ICD-10-PCS | Performed by: ORTHOPAEDIC SURGERY

## 2020-02-14 PROCEDURE — 76010000161 HC OR TIME 1 TO 1.5 HR INTENSV-TIER 1: Performed by: ORTHOPAEDIC SURGERY

## 2020-02-14 PROCEDURE — 74011250637 HC RX REV CODE- 250/637: Performed by: HOSPITALIST

## 2020-02-14 PROCEDURE — 73502 X-RAY EXAM HIP UNI 2-3 VIEWS: CPT

## 2020-02-14 PROCEDURE — 73501 X-RAY EXAM HIP UNI 1 VIEW: CPT

## 2020-02-14 PROCEDURE — C1713 ANCHOR/SCREW BN/BN,TIS/BN: HCPCS | Performed by: ORTHOPAEDIC SURGERY

## 2020-02-14 PROCEDURE — 82607 VITAMIN B-12: CPT

## 2020-02-14 PROCEDURE — 74011250636 HC RX REV CODE- 250/636: Performed by: NURSE ANESTHETIST, CERTIFIED REGISTERED

## 2020-02-14 PROCEDURE — 86900 BLOOD TYPING SEROLOGIC ABO: CPT

## 2020-02-14 PROCEDURE — 87086 URINE CULTURE/COLONY COUNT: CPT

## 2020-02-14 PROCEDURE — 77010033678 HC OXYGEN DAILY

## 2020-02-14 PROCEDURE — 74011250636 HC RX REV CODE- 250/636: Performed by: ORTHOPAEDIC SURGERY

## 2020-02-14 PROCEDURE — C8924 2D TTE W OR W/O FOL W/CON,FU: HCPCS

## 2020-02-14 PROCEDURE — 94760 N-INVAS EAR/PLS OXIMETRY 1: CPT

## 2020-02-14 PROCEDURE — 74011250637 HC RX REV CODE- 250/637: Performed by: ORTHOPAEDIC SURGERY

## 2020-02-14 PROCEDURE — 80048 BASIC METABOLIC PNL TOTAL CA: CPT

## 2020-02-14 PROCEDURE — 74011000250 HC RX REV CODE- 250: Performed by: NURSE ANESTHETIST, CERTIFIED REGISTERED

## 2020-02-14 PROCEDURE — P9016 RBC LEUKOCYTES REDUCED: HCPCS

## 2020-02-14 PROCEDURE — 84484 ASSAY OF TROPONIN QUANT: CPT

## 2020-02-14 PROCEDURE — 82746 ASSAY OF FOLIC ACID SERUM: CPT

## 2020-02-14 PROCEDURE — 74011000258 HC RX REV CODE- 258: Performed by: ORTHOPAEDIC SURGERY

## 2020-02-14 PROCEDURE — 65270000029 HC RM PRIVATE

## 2020-02-14 PROCEDURE — 83540 ASSAY OF IRON: CPT

## 2020-02-14 PROCEDURE — 74011000250 HC RX REV CODE- 250: Performed by: HOSPITALIST

## 2020-02-14 PROCEDURE — 76060000033 HC ANESTHESIA 1 TO 1.5 HR: Performed by: ORTHOPAEDIC SURGERY

## 2020-02-14 PROCEDURE — 74011250636 HC RX REV CODE- 250/636: Performed by: ANESTHESIOLOGY

## 2020-02-14 PROCEDURE — 74011000250 HC RX REV CODE- 250: Performed by: ANESTHESIOLOGY

## 2020-02-14 PROCEDURE — 74011250637 HC RX REV CODE- 250/637: Performed by: FAMILY MEDICINE

## 2020-02-14 PROCEDURE — 77030040361 HC SLV COMPR DVT MDII -B

## 2020-02-14 PROCEDURE — 36415 COLL VENOUS BLD VENIPUNCTURE: CPT

## 2020-02-14 PROCEDURE — 77030003665 HC NDL SPN BBMI -A: Performed by: ANESTHESIOLOGY

## 2020-02-14 PROCEDURE — 77030017016 HC DSG ANTIMIC BARR2 S&N -B: Performed by: ORTHOPAEDIC SURGERY

## 2020-02-14 PROCEDURE — 77030027138 HC INCENT SPIROMETER -A

## 2020-02-14 PROCEDURE — 86923 COMPATIBILITY TEST ELECTRIC: CPT

## 2020-02-14 PROCEDURE — 77030007880 HC KT SPN EPDRL BBMI -B: Performed by: ANESTHESIOLOGY

## 2020-02-14 PROCEDURE — 74011250636 HC RX REV CODE- 250/636: Performed by: HOSPITALIST

## 2020-02-14 PROCEDURE — 76210000016 HC OR PH I REC 1 TO 1.5 HR: Performed by: ORTHOPAEDIC SURGERY

## 2020-02-14 PROCEDURE — 77030020407 HC IV BLD WRMR ST 3M -A: Performed by: ANESTHESIOLOGY

## 2020-02-14 PROCEDURE — 77030018836 HC SOL IRR NACL ICUM -A: Performed by: ORTHOPAEDIC SURGERY

## 2020-02-14 PROCEDURE — 82728 ASSAY OF FERRITIN: CPT

## 2020-02-14 PROCEDURE — 85025 COMPLETE CBC W/AUTO DIFF WBC: CPT

## 2020-02-14 PROCEDURE — 30233N1 TRANSFUSION OF NONAUTOLOGOUS RED BLOOD CELLS INTO PERIPHERAL VEIN, PERCUTANEOUS APPROACH: ICD-10-PCS | Performed by: FAMILY MEDICINE

## 2020-02-14 DEVICE — SCREW BNE L85MM DIA7.3MM THRD L16MM CORT TI ST SELF DRL: Type: IMPLANTABLE DEVICE | Site: HIP | Status: FUNCTIONAL

## 2020-02-14 DEVICE — IMPLANTABLE DEVICE: Type: IMPLANTABLE DEVICE | Site: HIP | Status: FUNCTIONAL

## 2020-02-14 RX ORDER — SODIUM CHLORIDE 9 MG/ML
INJECTION, SOLUTION INTRAVENOUS
Status: DISCONTINUED | OUTPATIENT
Start: 2020-02-14 | End: 2020-02-14 | Stop reason: HOSPADM

## 2020-02-14 RX ORDER — HYDRALAZINE HYDROCHLORIDE 20 MG/ML
10 INJECTION INTRAMUSCULAR; INTRAVENOUS
Status: DISCONTINUED | OUTPATIENT
Start: 2020-02-14 | End: 2020-02-18 | Stop reason: HOSPADM

## 2020-02-14 RX ORDER — LIDOCAINE HYDROCHLORIDE 10 MG/ML
0.1 INJECTION INFILTRATION; PERINEURAL AS NEEDED
Status: DISCONTINUED | OUTPATIENT
Start: 2020-02-14 | End: 2020-02-14 | Stop reason: HOSPADM

## 2020-02-14 RX ORDER — CLONIDINE HYDROCHLORIDE 0.2 MG/1
0.2 TABLET ORAL
Status: DISCONTINUED | OUTPATIENT
Start: 2020-02-14 | End: 2020-02-18 | Stop reason: HOSPADM

## 2020-02-14 RX ORDER — SODIUM CHLORIDE, SODIUM LACTATE, POTASSIUM CHLORIDE, CALCIUM CHLORIDE 600; 310; 30; 20 MG/100ML; MG/100ML; MG/100ML; MG/100ML
125 INJECTION, SOLUTION INTRAVENOUS CONTINUOUS
Status: DISCONTINUED | OUTPATIENT
Start: 2020-02-14 | End: 2020-02-14 | Stop reason: HOSPADM

## 2020-02-14 RX ORDER — SODIUM CHLORIDE 9 MG/ML
250 INJECTION, SOLUTION INTRAVENOUS AS NEEDED
Status: DISCONTINUED | OUTPATIENT
Start: 2020-02-14 | End: 2020-02-18 | Stop reason: HOSPADM

## 2020-02-14 RX ORDER — FERROUS SULFATE, DRIED 160(50) MG
1 TABLET, EXTENDED RELEASE ORAL
Status: DISCONTINUED | OUTPATIENT
Start: 2020-02-14 | End: 2020-02-18 | Stop reason: HOSPADM

## 2020-02-14 RX ORDER — MORPHINE SULFATE 2 MG/ML
5 INJECTION, SOLUTION INTRAMUSCULAR; INTRAVENOUS
Status: DISCONTINUED | OUTPATIENT
Start: 2020-02-14 | End: 2020-02-18 | Stop reason: HOSPADM

## 2020-02-14 RX ORDER — PANTOPRAZOLE SODIUM 40 MG/1
40 TABLET, DELAYED RELEASE ORAL
Status: DISCONTINUED | OUTPATIENT
Start: 2020-02-14 | End: 2020-02-18 | Stop reason: HOSPADM

## 2020-02-14 RX ORDER — CEFPODOXIME PROXETIL 200 MG/1
200 TABLET, FILM COATED ORAL DAILY
Status: DISCONTINUED | OUTPATIENT
Start: 2020-02-14 | End: 2020-02-17

## 2020-02-14 RX ORDER — MAG HYDROX/ALUMINUM HYD/SIMETH 200-200-20
30 SUSPENSION, ORAL (FINAL DOSE FORM) ORAL
Status: DISCONTINUED | OUTPATIENT
Start: 2020-02-14 | End: 2020-02-18 | Stop reason: HOSPADM

## 2020-02-14 RX ORDER — SODIUM CHLORIDE 0.9 % (FLUSH) 0.9 %
5-40 SYRINGE (ML) INJECTION AS NEEDED
Status: DISCONTINUED | OUTPATIENT
Start: 2020-02-14 | End: 2020-02-18 | Stop reason: HOSPADM

## 2020-02-14 RX ORDER — BUPIVACAINE HYDROCHLORIDE 7.5 MG/ML
INJECTION, SOLUTION INTRASPINAL AS NEEDED
Status: DISCONTINUED | OUTPATIENT
Start: 2020-02-14 | End: 2020-02-14 | Stop reason: HOSPADM

## 2020-02-14 RX ORDER — PROPOFOL 10 MG/ML
INJECTION, EMULSION INTRAVENOUS
Status: DISCONTINUED | OUTPATIENT
Start: 2020-02-14 | End: 2020-02-14 | Stop reason: HOSPADM

## 2020-02-14 RX ORDER — ASPIRIN 325 MG
325 TABLET ORAL DAILY
Status: DISCONTINUED | OUTPATIENT
Start: 2020-02-15 | End: 2020-02-18 | Stop reason: HOSPADM

## 2020-02-14 RX ORDER — CEFAZOLIN SODIUM/WATER 2 G/20 ML
2 SYRINGE (ML) INTRAVENOUS ONCE
Status: COMPLETED | OUTPATIENT
Start: 2020-02-14 | End: 2020-02-14

## 2020-02-14 RX ORDER — OXYCODONE HYDROCHLORIDE 5 MG/1
10 TABLET ORAL
Status: DISCONTINUED | OUTPATIENT
Start: 2020-02-14 | End: 2020-02-18 | Stop reason: HOSPADM

## 2020-02-14 RX ORDER — PROPOFOL 10 MG/ML
INJECTION, EMULSION INTRAVENOUS AS NEEDED
Status: DISCONTINUED | OUTPATIENT
Start: 2020-02-14 | End: 2020-02-14 | Stop reason: HOSPADM

## 2020-02-14 RX ORDER — NALOXONE HYDROCHLORIDE 0.4 MG/ML
0.1 INJECTION, SOLUTION INTRAMUSCULAR; INTRAVENOUS; SUBCUTANEOUS AS NEEDED
Status: DISCONTINUED | OUTPATIENT
Start: 2020-02-14 | End: 2020-02-14 | Stop reason: HOSPADM

## 2020-02-14 RX ORDER — SODIUM CHLORIDE, SODIUM LACTATE, POTASSIUM CHLORIDE, CALCIUM CHLORIDE 600; 310; 30; 20 MG/100ML; MG/100ML; MG/100ML; MG/100ML
INJECTION, SOLUTION INTRAVENOUS
Status: DISCONTINUED | OUTPATIENT
Start: 2020-02-14 | End: 2020-02-14 | Stop reason: HOSPADM

## 2020-02-14 RX ORDER — SODIUM CHLORIDE 0.9 % (FLUSH) 0.9 %
5-40 SYRINGE (ML) INJECTION EVERY 8 HOURS
Status: DISCONTINUED | OUTPATIENT
Start: 2020-02-14 | End: 2020-02-18 | Stop reason: HOSPADM

## 2020-02-14 RX ORDER — LIDOCAINE HYDROCHLORIDE 20 MG/ML
INJECTION, SOLUTION EPIDURAL; INFILTRATION; INTRACAUDAL; PERINEURAL AS NEEDED
Status: DISCONTINUED | OUTPATIENT
Start: 2020-02-14 | End: 2020-02-14 | Stop reason: HOSPADM

## 2020-02-14 RX ORDER — HYDROMORPHONE HYDROCHLORIDE 2 MG/ML
0.5 INJECTION, SOLUTION INTRAMUSCULAR; INTRAVENOUS; SUBCUTANEOUS
Status: DISCONTINUED | OUTPATIENT
Start: 2020-02-14 | End: 2020-02-14 | Stop reason: HOSPADM

## 2020-02-14 RX ORDER — METOPROLOL TARTRATE 5 MG/5ML
5 INJECTION INTRAVENOUS
Status: DISCONTINUED | OUTPATIENT
Start: 2020-02-14 | End: 2020-02-18 | Stop reason: HOSPADM

## 2020-02-14 RX ORDER — ONDANSETRON 2 MG/ML
4 INJECTION INTRAMUSCULAR; INTRAVENOUS
Status: DISCONTINUED | OUTPATIENT
Start: 2020-02-14 | End: 2020-02-18 | Stop reason: HOSPADM

## 2020-02-14 RX ORDER — EPHEDRINE SULFATE/0.9% NACL/PF 50 MG/5 ML
SYRINGE (ML) INTRAVENOUS AS NEEDED
Status: DISCONTINUED | OUTPATIENT
Start: 2020-02-14 | End: 2020-02-14 | Stop reason: HOSPADM

## 2020-02-14 RX ADMIN — ASPIRIN 81 MG 81 MG: 81 TABLET ORAL at 09:44

## 2020-02-14 RX ADMIN — CEFPODOXIME PROXETIL 200 MG: 200 TABLET, FILM COATED ORAL at 09:44

## 2020-02-14 RX ADMIN — PERFLUTREN 1 ML: 6.52 INJECTION, SUSPENSION INTRAVENOUS at 10:20

## 2020-02-14 RX ADMIN — Medication 5 ML: at 21:39

## 2020-02-14 RX ADMIN — LIDOCAINE HYDROCHLORIDE 40 MG: 20 INJECTION, SOLUTION EPIDURAL; INFILTRATION; INTRACAUDAL; PERINEURAL at 12:36

## 2020-02-14 RX ADMIN — Medication 10 ML: at 17:23

## 2020-02-14 RX ADMIN — MONTELUKAST 10 MG: 10 TABLET, FILM COATED ORAL at 09:44

## 2020-02-14 RX ADMIN — PROPOFOL 10 MG: 10 INJECTION, EMULSION INTRAVENOUS at 12:38

## 2020-02-14 RX ADMIN — PHENYLEPHRINE HYDROCHLORIDE 100 MCG: 10 INJECTION INTRAVENOUS at 13:08

## 2020-02-14 RX ADMIN — PROPOFOL 75 MCG/KG/MIN: 10 INJECTION, EMULSION INTRAVENOUS at 13:00

## 2020-02-14 RX ADMIN — OXYCODONE HYDROCHLORIDE 10 MG: 5 TABLET ORAL at 17:12

## 2020-02-14 RX ADMIN — Medication 2 G: at 13:18

## 2020-02-14 RX ADMIN — MORPHINE SULFATE 5 MG: 2 INJECTION, SOLUTION INTRAMUSCULAR; INTRAVENOUS at 18:41

## 2020-02-14 RX ADMIN — LIDOCAINE HYDROCHLORIDE 20 MG: 20 INJECTION, SOLUTION EPIDURAL; INFILTRATION; INTRACAUDAL; PERINEURAL at 12:43

## 2020-02-14 RX ADMIN — MORPHINE SULFATE 5 MG: 2 INJECTION, SOLUTION INTRAMUSCULAR; INTRAVENOUS at 23:31

## 2020-02-14 RX ADMIN — LORATADINE 10 MG: 10 TABLET ORAL at 09:45

## 2020-02-14 RX ADMIN — PHENYLEPHRINE HYDROCHLORIDE 100 MCG: 10 INJECTION INTRAVENOUS at 13:15

## 2020-02-14 RX ADMIN — PANTOPRAZOLE SODIUM 40 MG: 40 TABLET, DELAYED RELEASE ORAL at 05:32

## 2020-02-14 RX ADMIN — SODIUM CHLORIDE, SODIUM LACTATE, POTASSIUM CHLORIDE, AND CALCIUM CHLORIDE 125 ML/HR: 600; 310; 30; 20 INJECTION, SOLUTION INTRAVENOUS at 10:51

## 2020-02-14 RX ADMIN — PHENYLEPHRINE HYDROCHLORIDE 100 MCG: 10 INJECTION INTRAVENOUS at 12:46

## 2020-02-14 RX ADMIN — Medication 5 ML: at 06:04

## 2020-02-14 RX ADMIN — OXYCODONE HYDROCHLORIDE 10 MG: 5 TABLET ORAL at 21:30

## 2020-02-14 RX ADMIN — SODIUM CHLORIDE, SODIUM LACTATE, POTASSIUM CHLORIDE, AND CALCIUM CHLORIDE: 600; 310; 30; 20 INJECTION, SOLUTION INTRAVENOUS at 12:31

## 2020-02-14 RX ADMIN — Medication 10 MG: at 12:46

## 2020-02-14 RX ADMIN — LOSARTAN POTASSIUM 25 MG: 25 TABLET ORAL at 17:12

## 2020-02-14 RX ADMIN — LEVOTHYROXINE SODIUM 50 MCG: 0.05 TABLET ORAL at 05:31

## 2020-02-14 RX ADMIN — Medication 5 MG: at 13:15

## 2020-02-14 RX ADMIN — PROPOFOL 20 MG: 10 INJECTION, EMULSION INTRAVENOUS at 12:36

## 2020-02-14 RX ADMIN — BUPIVACAINE HYDROCHLORIDE IN DEXTROSE 2 ML: 7.5 INJECTION, SOLUTION SUBARACHNOID at 12:46

## 2020-02-14 RX ADMIN — SODIUM CHLORIDE: 900 INJECTION, SOLUTION INTRAVENOUS at 13:00

## 2020-02-14 RX ADMIN — PROPOFOL 20 MG: 10 INJECTION, EMULSION INTRAVENOUS at 12:58

## 2020-02-14 RX ADMIN — PROPOFOL 20 MG: 10 INJECTION, EMULSION INTRAVENOUS at 12:43

## 2020-02-14 RX ADMIN — PANTOPRAZOLE SODIUM 40 MG: 40 TABLET, DELAYED RELEASE ORAL at 17:12

## 2020-02-14 RX ADMIN — PROPOFOL 10 MG: 10 INJECTION, EMULSION INTRAVENOUS at 12:40

## 2020-02-14 RX ADMIN — Medication 1 TABLET: at 17:12

## 2020-02-14 RX ADMIN — SODIUM CHLORIDE 1000 MG: 900 INJECTION, SOLUTION INTRAVENOUS at 21:32

## 2020-02-14 NOTE — PROGRESS NOTES
TRANSFER - OUT REPORT:    Verbal report given to Mohammed Osler, RN on Eduardo Ban  being transferred to Preop for routine progression of care       Report consisted of patients Situation, Background, Assessment and   Recommendations(SBAR). Information from the following report(s) SBAR, Kardex, ED Summary, Intake/Output, MAR and Recent Results was reviewed with the receiving nurse. Lines:   Peripheral IV 02/13/20 Anterior;Proximal;Right Forearm (Active)   Site Assessment Clean, dry, & intact 2/13/2020  7:48 PM   Phlebitis Assessment 0 2/13/2020  7:48 PM   Infiltration Assessment 0 2/13/2020  7:48 PM   Dressing Status Clean, dry, & intact 2/13/2020  7:48 PM   Dressing Type Transparent;Tape 2/13/2020  7:48 PM   Hub Color/Line Status Patent 2/13/2020  7:48 PM        Opportunity for questions and clarification was provided.

## 2020-02-14 NOTE — ANESTHESIA PREPROCEDURE EVALUATION
Relevant Problems   No relevant active problems       Anesthetic History               Review of Systems / Medical History  Patient summary reviewed, nursing notes reviewed and pertinent labs reviewed    Pulmonary            Asthma : well controlled       Neuro/Psych              Cardiovascular    Hypertension: well controlled          Hyperlipidemia    Exercise tolerance: >4 METS  Comments: TTE 1/7/20:  LVEF 60%. LA and RA moderately dilated. Mild MR, TR.   Severe pulm HTN   GI/Hepatic/Renal         Renal disease: CRI       Endo/Other      Hypothyroidism: well controlled       Other Findings   Comments: Myelodysplastic syndrome    Chronic severe anemia         Physical Exam    Airway  Mallampati: III  TM Distance: < 4 cm  Neck ROM: decreased range of motion   Mouth opening: Normal     Cardiovascular  Regular rate and rhythm,  S1 and S2 normal,  no murmur, click, rub, or gallop             Dental    Dentition: Full upper dentures     Pulmonary  Breath sounds clear to auscultation               Abdominal  GI exam deferred       Other Findings            Anesthetic Plan    ASA: 3  Anesthesia type: spinal            Anesthetic plan and risks discussed with: Patient    Son present

## 2020-02-14 NOTE — ANESTHESIA POSTPROCEDURE EVALUATION
Procedure(s):  LEFT HIP PERCUTANEOUS PINNING CANNULATED SCREWS.    spinal    Anesthesia Post Evaluation        Patient location during evaluation: PACU  Patient participation: complete - patient participated  Level of consciousness: responsive to verbal stimuli  Pain management: adequate  Airway patency: patent  Anesthetic complications: no  Cardiovascular status: acceptable  Respiratory status: acceptable  Hydration status: euvolemic        Vitals Value Taken Time   /62 2/14/2020  2:52 PM   Temp 36.7 °C (98 °F) 2/14/2020  2:42 PM   Pulse 69 2/14/2020  2:56 PM   Resp 15 2/14/2020  2:42 PM   SpO2 99 % 2/14/2020  2:55 PM   Vitals shown include unvalidated device data.

## 2020-02-14 NOTE — ANESTHESIA PROCEDURE NOTES
Spinal Block    Start time: 2/14/2020 12:38 PM  End time: 2/14/2020 12:46 PM  Performed by: Leo Foote MD  Authorized by: Leo Foote MD     Pre-procedure:   Indications: primary anesthetic  Preanesthetic Checklist: patient identified, risks and benefits discussed, anesthesia consent, site marked, patient being monitored and timeout performed    Timeout Time: 12:37          Spinal Block:   Patient Position:  Right lateral decubitus  Prep Region:  Lumbar  Prep: chlorhexidine and patient draped      Location:  L3-4 (paramedian approach)  Technique:  Single shot        Needle:   Needle Type:  Quincke  Needle Gauge:  22 G  Attempts:  3 or more      Events: CSF confirmed, no blood with aspiration and no paresthesia        Assessment:  Insertion:  Uncomplicated  Patient tolerance:  Patient tolerated the procedure well with no immediate complications

## 2020-02-14 NOTE — PROGRESS NOTES
Problem: Pressure Injury - Risk of  Goal: *Prevention of pressure injury  Description  Document Chuck Scale and appropriate interventions in the flowsheet. Outcome: Progressing Towards Goal  Note: Pressure Injury Interventions:  Sensory Interventions: Assess changes in LOC, Avoid rigorous massage over bony prominences    Moisture Interventions: Absorbent underpads, Check for incontinence Q2 hours and as needed    Activity Interventions: Increase time out of bed, Pressure redistribution bed/mattress(bed type), PT/OT evaluation    Mobility Interventions: HOB 30 degrees or less, Pressure redistribution bed/mattress (bed type), PT/OT evaluation    Nutrition Interventions: Document food/fluid/supplement intake    Friction and Shear Interventions: HOB 30 degrees or less                Problem: Patient Education: Go to Patient Education Activity  Goal: Patient/Family Education  Outcome: Progressing Towards Goal     Problem: Falls - Risk of  Goal: *Absence of Falls  Description  Document Amarjit Fall Risk and appropriate interventions in the flowsheet.   Outcome: Progressing Towards Goal  Note: Fall Risk Interventions:            Medication Interventions: Patient to call before getting OOB, Teach patient to arise slowly    Elimination Interventions: Call light in reach, Patient to call for help with toileting needs, Stay With Me (per policy), Toilet paper/wipes in reach, Toileting schedule/hourly rounds, Bed/chair exit alarm    History of Falls Interventions: Bed/chair exit alarm, Door open when patient unattended, Investigate reason for fall         Problem: Patient Education: Go to Patient Education Activity  Goal: Patient/Family Education  Outcome: Progressing Towards Goal     Problem: Patient Education: Go to Patient Education Activity  Goal: Patient/Family Education  Outcome: Progressing Towards Goal     Problem: Hip Fracture:Day of Admission Pre-op  Goal: Off Pathway (Use only if patient is Off Pathway)  Outcome: Progressing Towards Goal  Goal: Activity/Safety  Outcome: Progressing Towards Goal  Goal: Consults, if ordered  Outcome: Progressing Towards Goal  Goal: Diagnostic Test/Procedures  Outcome: Progressing Towards Goal  Goal: Nutrition/Diet  Outcome: Progressing Towards Goal  Goal: Medications  Outcome: Progressing Towards Goal  Goal: Respiratory  Outcome: Progressing Towards Goal  Goal: Treatments/Interventions/Procedures  Outcome: Progressing Towards Goal  Goal: Psychosocial  Outcome: Progressing Towards Goal  Goal: *Labs/Tests Within Defined Limits in Preparation for Surgery  Outcome: Progressing Towards Goal  Goal: *Optimal pain control at patient's stated goal  Outcome: Progressing Towards Goal  Goal: *Hemodynamically stable  Outcome: Progressing Towards Goal     Problem: Pain  Goal: *Control of Pain  Outcome: Progressing Towards Goal     Problem: Patient Education: Go to Patient Education Activity  Goal: Patient/Family Education  Outcome: Progressing Towards Goal

## 2020-02-14 NOTE — CONSULTS
[facility] Consult                Date of  Admission: 2/13/2020  6:06 PM     Primary Cardiologist: Dr Wilhelmenia Halsted [facility])  Consulting Physician: Dr Key Lind    CC/Reason for consult: Preop evaluation    Gabriel Vázquez is a 82 y.o. female     No prior history of coronary disease. Appears prior history of Takotsubo cardiomyopathy (from 2016) with subsequently normalization of left ventricular function. Last noted echocardiogram with preserved EF/preserved RV size and function but severely elevated RVSP at 65-70 mmHg [1/2020; prior reports stated moderately elevated RVSP]. Other history of myelodysplastic syndrome, moderate lt ICA stenosis. Appears patient was getting her aranesp injection earlier today and when leaving and walking started to feel dizzy and subsequently fell falling on her left hip. Initial x-rays findings suspicious for acute nondisplaced left femoral neck fracture and being evaluated for possible surgical evaluation by orthopedic surgery. No syncope. Cardiology has been asked to evaluate for preop evaluation per hospital medicine. Had a recent admission at [facility] for pneumonia on 1/6/2020 and above echo from that stay. States can do all her ADLs with no significant issues. Can go over a couple flights of stairs at home. States after recent pneumonia admission at [facility], her activity levels have been less than normal.  No chest discomfort. Denies any worsening dyspnea on exertion.     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 S72.002A    CKD (chronic kidney disease) stage 3, GFR 30-59 ml/min ([facility]) N18.3    Pulmonary HTN (HCC) I27.20    Chronic diastolic congestive heart failure (HCC) I50.32       Past Medical History:   Diagnosis Date    Aplastic anemia ([facility] 2/10/2017    Asthma     Bronchogenic cyst 2/10/2017    --DR Marimar Leyva    CHF (congestive heart failure) ([facility] 2/10/2017    Chronic cough 2/10/2017    Chronic cystitis 2/10/2017    --UROLOGIST - DR Ceci Sellers    Hyperlipidemia 2/10/2017    Hypertension 2/10/2017    Hypothyroidism 2/10/2017    IBS (irritable bowel syndrome) 2/9/2017    --DIVERTICULOSIS - [facility]  - DR Heena Medrano    Mediastinal tumor 2/9/2017    --S/P RESECTION  - SEEN CARDIOLOGIST - STRESS TEST - NORMAL 2012    Myelodysplasia (myelodysplastic syndrome) ([facility] 2/10/2017    --SEES HEMONC    Pneumonia     WITH MRSA / EMPYEMA /SEPSIS/ DEHYDRATION / RICHAR - RESOLVED WAS IN REHAB FOR A WHILE      Past Surgical History:   Procedure Laterality Date    HX CHOLECYSTECTOMY      HX CYST REMOVAL      MEDIASTINAL TUMOR  - BENIGN    HX HERNIA REPAIR      HX HYSTERECTOMY      HX OTHER SURGICAL Left     STAPEDECTOMY     HX TONSILLECTOMY       Allergies   Allergen Reactions    Bactrim [Sulfamethoprim] Rash    Levaquin [Levofloxacin] Palpitations    Tramadol Unknown (comments)     Makes patient hallucinate      Family History   Problem Relation Age of Onset    Cancer Mother         BREAST    Heart Disease Father     Diabetes Brother     Diabetes Other         SON; DAUGHTER        Current Facility-Administered Medications   Medication Dose Route Frequency    tuberculin injection 5 Units  5 Units IntraDERMal ONCE    [START ON 2/14/2020] atorvastatin (LIPITOR) tablet 80 mg  80 mg Oral DAILY    [START ON 2/14/2020] levothyroxine (SYNTHROID) tablet 50 mcg  50 mcg Oral 6am    losartan (COZAAR) tablet 25 mg  25 mg Oral BID    [START ON 2/14/2020] montelukast (SINGULAIR) tablet 10 mg  10 mg Oral DAILY    [START ON 2/14/2020] pantoprazole (PROTONIX) tablet 40 mg  40 mg Oral ACB    sodium chloride (NS) flush 5-40 mL  5-40 mL IntraVENous Q8H    sodium chloride (NS) flush 5-40 mL  5-40 mL IntraVENous PRN    acetaminophen (TYLENOL) tablet 650 mg  650 mg Oral Q4H PRN    HYDROcodone-acetaminophen (NORCO) 5-325 mg per tablet 1 Tab  1 Tab Oral Q4H PRN    morphine injection 1 mg  1 mg IntraVENous Q4H PRN    naloxone (NARCAN) injection 0.4 mg  0.4 mg IntraVENous PRN    diphenhydrAMINE (BENADRYL) injection 12.5 mg  12.5 mg IntraVENous Q4H PRN    ondansetron (ZOFRAN) injection 4 mg  4 mg IntraVENous Q4H PRN    [Held by provider] hydroCHLOROthiazide (MICROZIDE) capsule 12.5 mg  12.5 mg Oral QHS    [START ON 2/14/2020] cholecalciferol (VITAMIN D3) (400 Units /10 mcg) tablet 5 Tab  2,000 Units Oral DAILY    [START ON 2/14/2020] aspirin chewable tablet 81 mg  81 mg Oral DAILY    nitroglycerin (NITROSTAT) tablet 0.4 mg  0.4 mg SubLINGual PRN    [START ON 2/14/2020] loratadine (CLARITIN) tablet 10 mg  10 mg Oral DAILY       Review of Systems   Constitution: Positive for malaise/fatigue. Negative for chills, decreased appetite, fever, weight gain and weight loss. HENT: Negative for hearing loss and sore throat. Eyes: Negative for blurred vision and double vision. Cardiovascular: Negative for chest pain, dyspnea on exertion, orthopnea, palpitations, paroxysmal nocturnal dyspnea and syncope. Respiratory: Negative for cough and shortness of breath. Endocrine: Negative for cold intolerance and heat intolerance. Hematologic/Lymphatic: Negative for bleeding problem. Musculoskeletal: Positive for joint pain. Negative for falls, muscle cramps, muscle weakness and myalgias. Gastrointestinal: Negative for abdominal pain, hematemesis, hematochezia and melena. Neurological: Negative for dizziness and headaches.    Psychiatric/Behavioral: Negative for altered mental status. The patient is not nervous/anxious. Physical Exam  Vitals:    02/13/20 1812   BP: 166/70   Pulse: 63   Resp: 16   Temp: 98.1 °F (36.7 °C)   SpO2: 100%       Physical Exam:  General: Well Developed, Well Nourished, No Acute Distress  HEENT: pupils equal and round, no abnormalities noted  Neck: supple, no JVD, no carotid bruits  Heart: S1S2 with RRR without murmurs or gallops  Lungs: Clear throughout auscultation bilaterally without adventitious sounds  Abd: soft, nontender, nondistended, with good bowel sounds  Ext: warm, no edema, calves supple/nontender, pulses 2+ bilaterally  Skin: warm and dry  Psychiatric: Normal mood and affect  Neurologic: Alert and oriented X 3      Labs:   Recent Labs     02/13/20  1546      K 3.9   BUN 37*   CREA 1.49*   GLU 90   WBC 8.8   HGB 7.6*   HCT 23.7*      INR 1.0        Assessment/Plan:     Assessment:      Principal Problem:    Closed left hip fracture (HCC) (2/13/2020)    Active Problems:    Hypothyroidism (2/10/2017)      Hyperlipidemia (2/10/2017)      Hypertension (2/10/2017)      Chronic cough (2/10/2017)      MDS (myelodysplastic syndrome) ([facility]) (2/13/2020)      CKD (chronic kidney disease) stage 3, GFR 30-59 ml/min ([facility]) (2/13/2020)      Pulmonary HTN ([facility]) (2/13/2020)      Chronic diastolic congestive heart failure ([address] (2/13/2020)      Patient currently being evaluated for possible left hip fracture surgery. Recent echo noted with severely elevated RVSP which would put her at higher risk for perioperative complications. Echo also done at the time of recent pneumonia which could possibly elevate values [prior reported moderately elevated in 2016] and would repeat in a.m. to reassess however would not  with nonelective procedure. Will need to be closely monitored with anesthesia perioperatively. No active cardiac conditions.   Also myelodysplastic syndrome; defer treatment/anemia to primary team    Thank you very much for this referral. We appreciate the opportunity to participate in this patient's care. We will follow along with above stated plan.     Aimee Armando MD

## 2020-02-14 NOTE — PROGRESS NOTES
Hospitalist Note     Admit Date:  2020  6:06 PM   Name:  Eduardo Mondragon   Age:  82 y.o.  :  1935   MRN:  [id number]   PCP:  Halie Sun MD    subj:     82-year-old  female patient with a known history of multiple dysplastic syndrome on ARANESP every 2 to 3 weeks, recent dose today at the infusion clinic, known history of bronchogenic cyst causing chronic cough, recent echo on 2020 showed severe pulmonary hypertension and grade 2 diastolic dysfunction with pseudonormalization, hypertension, hyperlipidemia, hypothyroidism, history of peptic ulcer, CKD stage III, chronic allergies and chronic back pain/lumbar spinal stenosis. Of note recent pneumonia about 4 to 6 weeks prior. Patient was at the infusion clinic, got her dose of Aranesp, got up walked about 10-15 steps, suddenly started this dizzy feeling, spinning sensation, had a fall, fell on her left hip, complaining of severe pain unable to get up on her own. Patient was brought to emergency room for further evaluation. Patient otherwise did not complain of any headache or any chest pain or any shortness of breath or any nausea vomiting abdominal pain. Does not complain of any similar episodes prior. Few weeks ago noticed sudden giveaway of her left lower extremity but no dizziness. EKG sinus rhythm with premature atrial complexes. Troponin 0.02, creatinine of 1.49, GFR of 36, total bili of 1.4. CT head no acute etiology. Chest x-ray-Nonspecific mild diffuse interstitial prominence  X-ray left hip-  IMPRESSION  Impression: Findings suspicious for an acute nondisplaced left femoral neck  subcapital fracture, although not seen on all views. If clinically indicated,  consider MRI of the left hip for further evaluation. ER physician spoke to Dr. Mac Ham orthopedic physician on call, patient will be transferred to [facility] for further work-up for left hip fracture.     Today, surgery, no CP/SOB or cough, pain controlled well    Objective:     Patient Vitals for the past 24 hrs:   Temp Pulse Resp BP SpO2   02/14/20 1346 97.7 °F (36.5 °C) 65 14 127/59 98 %   02/14/20 1037 98.4 °F (36.9 °C) 61 18 164/85 98 %   02/14/20 0804 98.1 °F (36.7 °C) (!) 103 17 133/51 93 %   02/14/20 0352 98.2 °F (36.8 °C) (!) 54 16 130/61 96 %   02/13/20 2346 97.8 °F (36.6 °C) (!) 57 16 149/63 97 %   02/13/20 2159 97.6 °F (36.4 °C) 60 16 152/53 97 %   02/13/20 1812 98.1 °F (36.7 °C) 63 16 166/70 100 %     Oxygen Therapy  O2 Sat (%): 98 % (02/14/20 1346)  O2 Device: Nasal cannula (02/14/20 1346)    Intake/Output Summary (Last 24 hours) at 2/14/2020 1351  Last data filed at 2/14/2020 1334  Gross per 24 hour   Intake 1310 ml   Output 1350 ml   Net -40 ml       Physical Exam:  General:    Well nourished. Alert. Mild distress  CV:   RRR. No murmur, rub, or gallop. Lungs:  CTAB. No wheezing, rhonchi, or rales. Abdomen: Soft, nontender, nondistended. Bowel sounds normal.   Extremities: Warm and dry. No cyanosis or edema. Point tenderness over the left hip region, able to do flexion at the left hip with significant pain. Normal range of movements of all other extremities . neurologic: grossly intact. Skin:     No rashes or jaundice. Psych:  Normal mood and affect. I reviewed the labs, imaging, EKGs, telemetry, and other studies done this admission.   Data Review:   Recent Results (from the past 24 hour(s))   EKG, 12 LEAD, INITIAL    Collection Time: 02/13/20  3:34 PM   Result Value Ref Range    Ventricular Rate 63 BPM    Atrial Rate 63 BPM    P-R Interval 198 ms    QRS Duration 90 ms    Q-T Interval 444 ms    QTC Calculation (Bezet) 454 ms    Calculated P Axis 72 degrees    Calculated R Axis 45 degrees    Calculated T Axis 77 degrees    Diagnosis       Sinus rhythm with Premature atrial complexes  Otherwise normal ECG  When compared with ECG of 14-OCT-2003 14:53,  Premature atrial complexes are now Present  Confirmed by Beth Elizalde MD ()TERRY ([id number]) on 2/13/2020 5:02:34 PM     TROPONIN I    Collection Time: 02/13/20  3:46 PM   Result Value Ref Range    Troponin-I, Qt. <0.02 (L) 0.02 - 0.05 NG/ML   CBC WITH AUTOMATED DIFF    Collection Time: 02/13/20  3:46 PM   Result Value Ref Range    WBC 8.8 4.3 - 11.1 K/uL    RBC 2.33 (L) 4.05 - 5.2 M/uL    HGB 7.6 (L) 11.7 - 15.4 g/dL    HCT 23.7 (L) 35.8 - 46.3 %    .7 (H) 79.6 - 97.8 FL    MCH 32.6 26.1 - 32.9 PG    MCHC 32.1 31.4 - 35.0 g/dL    RDW 23.0 (H) 11.9 - 14.6 %    PLATELET 210 639 - 781 K/uL    MPV 12.8 (H) 9.4 - 12.3 FL    ABSOLUTE NRBC 0.02 0.0 - 0.2 K/uL    DF AUTOMATED      NEUTROPHILS 64 43 - 78 %    LYMPHOCYTES 28 13 - 44 %    MONOCYTES 6 4.0 - 12.0 %    EOSINOPHILS 0 (L) 0.5 - 7.8 %    BASOPHILS 0 0.0 - 2.0 %    IMMATURE GRANULOCYTES 1 0.0 - 5.0 %    ABS. NEUTROPHILS 5.6 1.7 - 8.2 K/UL    ABS. LYMPHOCYTES 2.5 0.5 - 4.6 K/UL    ABS. MONOCYTES 0.5 0.1 - 1.3 K/UL    ABS. EOSINOPHILS 0.0 0.0 - 0.8 K/UL    ABS. BASOPHILS 0.0 0.0 - 0.2 K/UL    ABS. IMM. GRANS. 0.1 0.0 - 0.5 K/UL   METABOLIC PANEL, COMPREHENSIVE    Collection Time: 02/13/20  3:46 PM   Result Value Ref Range    Sodium 138 136 - 145 mmol/L    Potassium 3.9 3.5 - 5.1 mmol/L    Chloride 109 (H) 98 - 107 mmol/L    CO2 23 21 - 32 mmol/L    Anion gap 6 (L) 7 - 16 mmol/L    Glucose 90 65 - 100 mg/dL    BUN 37 (H) 8 - 23 MG/DL    Creatinine 1.49 (H) 0.6 - 1.0 MG/DL    GFR est AA 43 (L) >60 ml/min/1.73m2    GFR est non-AA 36 (L) >60 ml/min/1.73m2    Calcium 9.9 8.3 - 10.4 MG/DL    Bilirubin, total 1.4 (H) 0.2 - 1.1 MG/DL    ALT (SGPT) 27 12 - 65 U/L    AST (SGOT) 29 15 - 37 U/L    Alk.  phosphatase 81 50 - 130 U/L    Protein, total 7.5 6.3 - 8.2 g/dL    Albumin 3.7 3.2 - 4.6 g/dL    Globulin 3.8 (H) 2.3 - 3.5 g/dL    A-G Ratio 1.0 (L) 1.2 - 3.5     PROTHROMBIN TIME + INR    Collection Time: 02/13/20  3:46 PM   Result Value Ref Range    Prothrombin time 13.6 12.0 - 14.7 sec    INR 1.0     URINALYSIS W/ RFLX MICROSCOPIC Collection Time: 02/13/20  4:55 PM   Result Value Ref Range    Color YELLOW      Appearance CLOUDY      Specific gravity 1.010 1.001 - 1.023      pH (UA) 5.0 5.0 - 9.0      Protein NEGATIVE  NEG mg/dL    Glucose NEGATIVE  mg/dL    Ketone NEGATIVE  NEG mg/dL    Bilirubin NEGATIVE  NEG      Blood NEGATIVE  NEG      Urobilinogen 0.2 0.2 - 1.0 EU/dL    Nitrites NEGATIVE  NEG      Leukocyte Esterase SMALL (A) NEG      WBC 20-50 0 /hpf    RBC 0-3 0 /hpf    Epithelial cells 0-3 0 /hpf    Bacteria 1+ (H) 0 /hpf    Casts 5-10 0 /lpf   TROPONIN I    Collection Time: 02/14/20 12:12 AM   Result Value Ref Range    Troponin-I, Qt. <0.02 (L) 0.02 - 4.19 NG/ML   METABOLIC PANEL, BASIC    Collection Time: 02/14/20  3:18 AM   Result Value Ref Range    Sodium 142 136 - 145 mmol/L    Potassium 3.6 3.5 - 5.1 mmol/L    Chloride 109 (H) 98 - 107 mmol/L    CO2 24 21 - 32 mmol/L    Anion gap 9 7 - 16 mmol/L    Glucose 90 65 - 100 mg/dL    BUN 34 (H) 8 - 23 MG/DL    Creatinine 1.38 (H) 0.6 - 1.0 MG/DL    GFR est AA 47 (L) >60 ml/min/1.73m2    GFR est non-AA 39 (L) >60 ml/min/1.73m2    Calcium 9.1 8.3 - 10.4 MG/DL   CBC WITH AUTOMATED DIFF    Collection Time: 02/14/20  3:18 AM   Result Value Ref Range    WBC 5.1 4.3 - 11.1 K/uL    RBC 2.19 (L) 4.05 - 5.2 M/uL    HGB 7.1 (L) 11.7 - 15.4 g/dL    HCT 22.4 (L) 35.8 - 46.3 %    .3 (H) 79.6 - 97.8 FL    MCH 32.4 26.1 - 32.9 PG    MCHC 31.7 31.4 - 35.0 g/dL    RDW 22.4 (H) 11.9 - 14.6 %    PLATELET 469 772 - 945 K/uL    MPV 13.0 (H) 9.4 - 12.3 FL    ABSOLUTE NRBC 0.00 0.0 - 0.2 K/uL    DF AUTOMATED      NEUTROPHILS 39 (L) 43 - 78 %    LYMPHOCYTES 49 (H) 13 - 44 %    MONOCYTES 10 4.0 - 12.0 %    EOSINOPHILS 1 0.5 - 7.8 %    BASOPHILS 0 0.0 - 2.0 %    IMMATURE GRANULOCYTES 1 0.0 - 5.0 %    ABS. NEUTROPHILS 2.0 1.7 - 8.2 K/UL    ABS. LYMPHOCYTES 2.5 0.5 - 4.6 K/UL    ABS. MONOCYTES 0.5 0.1 - 1.3 K/UL    ABS. EOSINOPHILS 0.1 0.0 - 0.8 K/UL    ABS. BASOPHILS 0.0 0.0 - 0.2 K/UL    ABS. IMM. GRANS. 0.0 0.0 - 0.5 K/UL   TROPONIN I    Collection Time: 02/14/20  3:18 AM   Result Value Ref Range    Troponin-I, Qt. <0.02 (L) 0.02 - 0.05 NG/ML   FERRITIN    Collection Time: 02/14/20  3:18 AM   Result Value Ref Range    Ferritin 1,651 (H) 8 - 388 NG/ML   VITAMIN B12    Collection Time: 02/14/20  3:18 AM   Result Value Ref Range    Vitamin B12 562 193 - 986 pg/mL   FOLATE    Collection Time: 02/14/20  3:18 AM   Result Value Ref Range    Folate 16.8 3.1 - 17.5 ng/mL   TRANSFERRIN    Collection Time: 02/14/20  3:18 AM   Result Value Ref Range    Transferrin 175 (L) 202 - 364 mg/dL   TRANSFERRIN SATURATION    Collection Time: 02/14/20  3:18 AM   Result Value Ref Range    Iron 95 35 - 150 ug/dL    TIBC 228 (L) 250 - 450 ug/dL    Transferrin Saturation 42 >20 %   TYPE + CROSSMATCH    Collection Time: 02/14/20 11:26 AM   Result Value Ref Range    Crossmatch Expiration 02/17/2020     ABO/Rh(D) A POSITIVE     Antibody screen NEG     Unit number [id number]     Blood component type  LR     Unit division 00     Status of unit ISSUED     Crossmatch result Compatible     Unit number [id number]     Blood component type  LR     Unit division 00     Status of unit ALLOCATED     Crossmatch result Compatible        All Micro Results     Procedure Component Value Units Date/Time    CULTURE, URINE [[id number]] Collected:  02/14/20 1020    Order Status:  Completed Specimen:  [place] Specimen Updated:  02/14/20 1104    CULTURE, URINE [[id number]]     Order Status:  Canceled Specimen:  Clean catch           Other Studies:  Xr Hip Lt W Or Wo Pelv 2-3 Vws    Result Date: 2/13/2020  Exam:  AP pelvis and left hip, left femur radiographs History:  pain, fall, 84 years Female Comparison: None available Findings: Apparent cortical step-off is seen of the left femoral head neck junctions superiorly, although not well visualized on the crosstable lateral view, this appearance is suspicious for an acute nondisplaced left femoral neck subcapital fracture. Normal alignment, joint spaces preserved. The visualized distal left femur appears intact. Normal mineralization. Calcific atherosclerosis. No evidence of knee joint effusion. Visualized soft tissues otherwise unremarkable. Impression: Findings suspicious for an acute nondisplaced left femoral neck subcapital fracture, although not seen on all views. If clinically indicated, consider MRI of the left hip for further evaluation. Xr Femur Lt 2 V    Result Date: 2/13/2020  Exam:  AP pelvis and left hip, left femur radiographs History:  pain, fall, 84 years Female Comparison: None available Findings: Apparent cortical step-off is seen of the left femoral head neck junctions superiorly, although not well visualized on the crosstable lateral view, this appearance is suspicious for an acute nondisplaced left femoral neck subcapital fracture. Normal alignment, joint spaces preserved. The visualized distal left femur appears intact. Normal mineralization. Calcific atherosclerosis. No evidence of knee joint effusion. Visualized soft tissues otherwise unremarkable. Impression: Findings suspicious for an acute nondisplaced left femoral neck subcapital fracture, although not seen on all views. If clinically indicated, consider MRI of the left hip for further evaluation. Ct Head Wo Cont    Result Date: 2/13/2020  Examination: CT scan of the brain without contrast. History: fall, 84 years Female PT was at [facility] getting an infusion and when she was walking out of treatment room she got lightheaded and fell. Pt c/o pain in left hip, she is unable to bear weight Technique: 5 mm axial imaging of the brain from the posterior fossa to the vertex.   All CT scans at this location are performed using dose modulation techniques as appropriate to a performed exam including the following: automated exposure control; adjustment of the mA and/or kV according to patient's size (this includes techniques or standardized protocols for targeted exams where dose is matched to indication/reason for exam; i.e. extremities or head); use of iterative reconstruction technique. Comparison:  None available Findings: The brain parenchyma appears within normal limits for age. The ventricles, sulci are age-appropriate. No intracranial hemorrhage or extra-axial collection is identified. No evidence of acute infarct. No mass effect or midline shift is present. Basal cisterns are intact. The visualized paranasal sinuses and mastoid air cells are clear. The orbits, bones, and soft tissues are normal in appearance. Impression:  Normal unenhanced CT of the brain for age. No acute intracranial abnormality. Xr Chest Port    Result Date: 2/13/2020  AP chest radiograph History: cough, 84 years Female Comparison: None available Findings:   Normal cardiomediastinal silhouette. Nonspecific mild diffuse interstitial prominence. Mild subsegmental atelectasis bilateral lung bases. No evidence of pneumothorax, pleural effusion, or air space consolidation. Evidence of cholecystectomy. Visualized soft tissue and osseous structures otherwise unremarkable. Impression:   Nonspecific mild diffuse interstitial prominence.         Assessment and Plan:     Hospital Problems as of 2/14/2020 Date Reviewed: 2/14/2020          Codes Class Noted - Resolved POA    MDS ([facility]elodysplastic syndrome) ([facility] ICD-10-CM: D46.9  ICD-9-CM: 238.75  2/13/2020 - Present Unknown        * (Principal) Closed left hip fracture ([facility] ICD-10-CM: V60.500B  ICD-9-CM: 820.8  2/13/2020 - Present Unknown        CKD (chronic kidney disease) stage 3, GFR 30-59 ml/min (HCC) ICD-10-CM: N18.3  ICD-9-CM: 585.3  2/13/2020 - Present Unknown        Pulmonary HTN ([facility] ICD-10-CM: I27.20  ICD-9-CM: 416.8  2/13/2020 - Present Unknown        Chronic diastolic congestive heart failure (HCC) ICD-10-CM: I50.32  ICD-9-CM: 428.32, 428.0  2/13/2020 - Present Unknown Dizziness ICD-10-CM: R42  ICD-9-CM: 780.4  2/13/2020 - Present Unknown        Hypothyroidism ICD-10-CM: E03.9  ICD-9-CM: 244.9  2/10/2017 - Present Yes        Hyperlipidemia ICD-10-CM: E78.5  ICD-9-CM: 272.4  2/10/2017 - Present Yes        Hypertension ICD-10-CM: I10  ICD-9-CM: 401.9  2/10/2017 - Present Yes        Chronic cough ICD-10-CM: R05  ICD-9-CM: 786.2  2/10/2017 - Present Yes              Dx:  1- Left hip fx, ORIF today  2- Pulm HTN and ch dCHF, stable, seen by cardiology  3- Ac simple UTI  4- HTN, controlled  5- Hx of CKD 3, MDS- stable  6- Anemia, hx of PUD    Rx:  Follow postop  Pain control  Vantin po  Follow urine CS, anemia w/u  Hb in am    Dispo: rehab    Signed:  Yareli Puga MD

## 2020-02-14 NOTE — PROGRESS NOTES
Problem: Pressure Injury - Risk of  Goal: *Prevention of pressure injury  Description  Document Chuck Scale and appropriate interventions in the flowsheet. Outcome: Progressing Towards Goal  Note: Pressure Injury Interventions:  Sensory Interventions: Assess changes in LOC, Avoid rigorous massage over bony prominences    Moisture Interventions: Absorbent underpads, Check for incontinence Q2 hours and as needed    Activity Interventions: Increase time out of bed, Pressure redistribution bed/mattress(bed type), PT/OT evaluation    Mobility Interventions: HOB 30 degrees or less, Pressure redistribution bed/mattress (bed type), PT/OT evaluation    Nutrition Interventions: Document food/fluid/supplement intake, Offer support with meals,snacks and hydration    Friction and Shear Interventions: HOB 30 degrees or less                Problem: Falls - Risk of  Goal: *Absence of Falls  Description  Document Amarjit Fall Risk and appropriate interventions in the flowsheet.   Outcome: Progressing Towards Goal  Note: Fall Risk Interventions:            Medication Interventions: Bed/chair exit alarm, Patient to call before getting OOB, Teach patient to arise slowly    Elimination Interventions: Bed/chair exit alarm, Call light in reach, Patient to call for help with toileting needs, Stay With Me (per policy), Toilet paper/wipes in reach, Toileting schedule/hourly rounds    History of Falls Interventions: Bed/chair exit alarm, Consult care management for discharge planning, Door open when patient unattended, Investigate reason for fall, Room close to nurse's station         Problem: Pain  Goal: *Control of Pain  Outcome: Progressing Towards Goal

## 2020-02-14 NOTE — OP NOTES
Operative Report    Patient: Jozef Cisneros MRN: [id number]  SSN: [id number]    YOB: 1935  Age: 82 y.o. Sex: female       Date of Surgery: 2/14/2020     History:  Jozef Cisneros is a 82 y.o. female who fell and injured her left hip. She was seen and found to have a nondisplaced left femoral neck fracture. I talked to her and her family regarding the need for operative fixation of this with percutaneous screw fixation. I talked to the patient and/or their representative and explained the exact nature the procedure. I also went through a detailed list of the material risks associated with  the procedure which included risk of bleeding, infection, injury to nearby structures, worsening the situation, as well as the risks associate with anesthesia and finally death. Also talked with him regarding the benefits and alternatives to the procedure. Preoperative Diagnosis: LEFT HIP FX     Postoperative Diagnosis: LEFT HIP FX     Surgeon(s) and Role:     * Virginia Vidales MD - Primary    Anesthesia: Spinal     Procedure: Procedure(s):  LEFT HIP PERCUTANEOUS PINNING CANNULATED SCREWS     Procedure in Detail: Assessment of spinal anesthesia the left lower extremity was placed very gently in boot traction and then prepped and draped in usual sterile fashion. I then placed a guidewire along the anterior aspect the femoral neck to make sure I could ascertain exactly the best spot for the incision I made a small incision laterally and placed my first pin along the inferior portion of the femoral neck on the AP projection and in line with the center the femoral head on the lateral projection. I then placed 2 additional pins one more superior and anterior one more superior and posterior roughly forming an inverted triangle type of configuration.   Once the guidewires were in place and then began placing screws placing a 85 mm screw in the inferior portion of the neck with a washer and then to 80 mm screws more superiorly after these were in place I checked the final reduction as well as the placement of my hardware and I was very pleased with this. I did spend some time making sure that the screws were very close to the subchondral bone but not into the hip joint itself and once I was assured of this I removed the guidewires and then closed incisions with staples. Dressings were applied the patient was awakened and taken recovery in stable condition      Estimated Blood Loss: 30 cc    Tourniquet Time: * No tourniquets in log *      Implants: * No implants in log *            Specimens: * No specimens in log *        Drains: None                Complications: None    Counts: Sponge and needle counts were correct times two.     Signed By:  Cabrera Montanez MD     February 14, 2020

## 2020-02-14 NOTE — CONSULTS
Consult    Patient: Angel Nath MRN: [id number]  SSN: [id number]    YOB: 1935  Age: 82 y.o. Sex: female      Subjective:      Angel Nath is a 82 y.o. female who fell on her left lower extremity from a standing height. She had just left a hospital clinic getting an injection. She denies any other problems really besides her left lower extremity. She says she has some soreness in her left shoulder but this is the only other thing that she has noticed.   She is able to move her left shoulder around very well and she does seem to think that the pain is rather minimal.    Past Medical History:   Diagnosis Date    [facility] 2/10/2017    Asthma     Bronchogenic cyst 2/10/2017    --DR Whitney Ayala    CHF (congestive heart failure) ([facility] 2/10/2017    Chronic cough 2/10/2017    Chronic cystitis 2/10/2017    --UROLOGIST - DR GALO    Hyperlipidemia 2/10/2017    Hypertension 2/10/2017    Hypothyroidism 2/10/2017    IBS (irritable bowel syndrome) 2/9/2017    --DIVERTICULOSIS - [facility]  - DR Johnathon Olmos    Mediastinal tumor 2/9/2017    --S/P RESECTION  - SEEN CARDIOLOGIST - STRESS TEST - NORMAL 2012    Myelodysplasia (myelodysplastic syndrome) ([facility] 2/10/2017    --SEES HEMONC    Pneumonia     WITH MRSA / EMPYEMA /SEPSIS/ DEHYDRATION / RICHAR - RESOLVED WAS IN REHAB FOR A WHILE     Past Surgical History:   Procedure Laterality Date    HX CHOLECYSTECTOMY      HX CYST REMOVAL      MEDIASTINAL TUMOR  - BENIGN    HX HERNIA REPAIR      HX HYSTERECTOMY      HX OTHER SURGICAL Left     STAPEDECTOMY     HX TONSILLECTOMY        FAMHX -No history of inflammatory arthritis   Social History     Tobacco Use    Smoking status: Never Smoker    Smokeless tobacco: Never Used   Substance Use Topics    Alcohol use: No      Current Facility-Administered Medications   Medication Dose Route Frequency Provider Last Rate Last Dose    influenza vaccine 2019-20 (6 mos+)(PF) (FLUARIX/FLULAVAL/FLUZONE QUAD) injection 0.5 mL  0.5 mL IntraMUSCular PRIOR TO DISCHARGE Sugey Beaulieu MD        cefpodoxime Gladys Corporal) tablet 200 mg  200 mg Oral DAILY Charla Whitehead MD        pantoprazole (PROTONIX) tablet 40 mg  40 mg Oral ACB&D Charla Whitehead MD        cloNIDine HCL (CATAPRES) tablet 0.2 mg  0.2 mg Oral BID PRN Charla Whitehead MD        hydrALAZINE (APRESOLINE) 20 mg/mL injection 10 mg  10 mg IntraVENous Q6H PRN Charla Whitehead MD        tuberculin injection 5 Units  5 Units IntraDERMal Bennett Taylor MD   5 Units at 02/13/20 1835    levothyroxine (SYNTHROID) tablet 50 mcg  50 mcg Oral Edgar Zhang MD   50 mcg at 02/14/20 0531    losartan (COZAAR) tablet 25 mg  25 mg Oral BID Adam Hartman MD   25 mg at 02/13/20 1947    montelukast (SINGULAIR) tablet 10 mg  10 mg Oral DAILY Adam Hartman MD        sodium chloride (NS) flush 5-40 mL  5-40 mL IntraVENous Q8H Adam Hartman MD   5 mL at 02/14/20 0604    sodium chloride (NS) flush 5-40 mL  5-40 mL IntraVENous PRN Adam Hartman MD        acetaminophen (TYLENOL) tablet 650 mg  650 mg Oral Q4H PRN Adam Hartman MD        HYDROcodone-acetaminophen (NORCO) 5-325 mg per tablet 1 Tab  1 Tab Oral Q4H PRN Adam Hartman MD   1 Tab at 02/13/20 2252    morphine injection 1 mg  1 mg IntraVENous Q4H PRN Adam Hartman MD        naloxone [facility]) injection 0.4 mg  0.4 mg IntraVENous PRN Adam Hartman MD        diphenhydrAMINE (BENADRYL) injection 12.5 mg  12.5 mg IntraVENous Q4H PRN Adam Hartman MD        ondansetron (ZOFRAN) injection 4 mg  4 mg IntraVENous Q4H PRN Adam Hartman MD        cholecalciferol (VITAMIN D3) (400 Units /10 mcg) tablet 5 Tab  2,000 Units Oral DAILY Adam Hartman MD        aspirin chewable tablet 81 mg  81 mg Oral DAILY Adam Hartman MD        nitroglycerin (NITROSTAT) tablet 0.4 mg  0.4 mg SubLINGual PRN MD Zeny Francois loratadine (CLARITIN) tablet 10 mg  10 mg Oral DAILY Keshia Vale MD            Allergies   Allergen Reactions    Bactrim [Sulfamethoprim] Rash    Levaquin [Levofloxacin] Palpitations    Tramadol Unknown (comments)     Makes patient hallucinate       Review of Systems:  A comprehensive review of systems was negative except for that written in the History of Present Illness. Objective:     Vitals:    02/13/20 2159 02/13/20 2346 02/14/20 0352 02/14/20 0542   BP: 152/53 149/63 130/61    Pulse: 60 (!) 57 (!) 54    Resp: 16 16 16    Temp: 97.6 °F (36.4 °C) 97.8 °F (36.6 °C) 98.2 °F (36.8 °C)    SpO2: 97% 97% 96%    Weight:    61 kg (134 lb 7.7 oz)        Physical Exam:  Physical Exam:  General:  Alert, cooperative, no distress, appears stated age. Orientation she is alert and oriented to person place time and situation   Eyes:  Conjunctivae/corneas clear. PERRL, EOMs intact. Fundi benign   Ears:  Normal TMs and external ear canals both ears. Nose: Nares normal. Septum midline. Mucosa normal. No drainage or sinus tenderness. Mouth/Throat: Lips, mucosa, and tongue normal. Teeth and gums normal.   Neck: Supple, symmetrical, trachea midline, no adenopathy, thyroid: no enlargment/tenderness/nodules, no carotid bruit and no JVD. Back:   Symmetric, no curvature. ROM normal. No CVA tenderness. Lungs:   Clear to auscultation bilaterally. Heart:  Regular rate and rhythm, S1, S2 normal, no murmur, click, rub or gallop. Abdomen:   Soft, non-tender. Bowel sounds normal. No masses,  No organomegaly. No lymphadenopathy in all 4 extremities  Alignment normal alignment left lower extremity  Range of motion pain with range of motion left hip  Vasculardistal pulses palpable in lower extremity  Sensory/motordeep tendon reflexes normal left lower extremity. Motor and sensory function intact.   Stability no evidence of any obvious instability left hip  Tenderness to palpation over the left hip area  Skin no open wounds  Gait-I have not asked her to weight-bear because of her history of known fracture    Assessment:     Hospital Problems  Never Reviewed          Codes Class Noted POA    MDS (myelodysplastic syndrome) ([facility] ICD-10-CM: D46.9  ICD-9-CM: 238.75  2/13/2020 Unknown        * (Principal) Closed left hip fracture ([facility] ICD-10-CM: U10.001K  ICD-9-CM: 820.8  2/13/2020 Unknown        CKD (chronic kidney disease) stage 3, GFR 30-59 ml/min ([facility]) ICD-10-CM: N18.3  ICD-9-CM: 585.3  2/13/2020 Unknown        Pulmonary HTN ([facility] ICD-10-CM: I27.20  ICD-9-CM: 416.8  2/13/2020 Unknown        Chronic diastolic congestive heart failure ([facility] ICD-10-CM: I50.32  ICD-9-CM: 428.32, 428.0  2/13/2020 Unknown        Dizziness ICD-10-CM: R42  ICD-9-CM: 780.4  2/13/2020 Unknown        Hypothyroidism ICD-10-CM: E03.9  ICD-9-CM: 244.9  2/10/2017 Yes        Hyperlipidemia ICD-10-CM: E78.5  ICD-9-CM: 272.4  2/10/2017 Yes        Hypertension ICD-10-CM: I10  ICD-9-CM: 401.9  2/10/2017 Yes        Chronic cough ICD-10-CM: R05  ICD-9-CM: 786.2  2/10/2017 Yes            Left closed nondisplaced femoral neck fracture    Xrays and or studies:    AP and lateral views of the left neck shows a essentially nondisplaced left femoral neck fracture  Plan:     I believe this can be treated with percutaneous screw fixation. I explained to the patient exactly what this would involve.   The plan will be to proceed with this today    Signed By: Sandrita Fuller MD     February 14, 2020

## 2020-02-15 LAB
BASOPHILS # BLD: 0 K/UL (ref 0–0.2)
BASOPHILS NFR BLD: 0 % (ref 0–2)
DIFFERENTIAL METHOD BLD: ABNORMAL
EOSINOPHIL # BLD: 0.1 K/UL (ref 0–0.8)
EOSINOPHIL NFR BLD: 1 % (ref 0.5–7.8)
ERYTHROCYTE [DISTWIDTH] IN BLOOD BY AUTOMATED COUNT: 22.3 % (ref 11.9–14.6)
HCT VFR BLD AUTO: 27 % (ref 35.8–46.3)
HGB BLD-MCNC: 8.6 G/DL (ref 11.7–15.4)
IMM GRANULOCYTES # BLD AUTO: 0 K/UL (ref 0–0.5)
IMM GRANULOCYTES NFR BLD AUTO: 1 % (ref 0–5)
LYMPHOCYTES # BLD: 1.8 K/UL (ref 0.5–4.6)
LYMPHOCYTES NFR BLD: 42 % (ref 13–44)
MCH RBC QN AUTO: 32.3 PG (ref 26.1–32.9)
MCHC RBC AUTO-ENTMCNC: 31.9 G/DL (ref 31.4–35)
MCV RBC AUTO: 101.5 FL (ref 79.6–97.8)
MM INDURATION POC: 0 MM (ref 0–5)
MONOCYTES # BLD: 0.6 K/UL (ref 0.1–1.3)
MONOCYTES NFR BLD: 14 % (ref 4–12)
NEUTS SEG # BLD: 1.8 K/UL (ref 1.7–8.2)
NEUTS SEG NFR BLD: 42 % (ref 43–78)
NRBC # BLD: 0 K/UL (ref 0–0.2)
PLATELET # BLD AUTO: 143 K/UL (ref 150–450)
PMV BLD AUTO: 13.4 FL (ref 9.4–12.3)
PPD POC: NEGATIVE NEGATIVE
RBC # BLD AUTO: 2.66 M/UL (ref 4.05–5.2)
WBC # BLD AUTO: 4.3 K/UL (ref 4.3–11.1)

## 2020-02-15 PROCEDURE — 36415 COLL VENOUS BLD VENIPUNCTURE: CPT

## 2020-02-15 PROCEDURE — 65270000029 HC RM PRIVATE

## 2020-02-15 PROCEDURE — 97161 PT EVAL LOW COMPLEX 20 MIN: CPT

## 2020-02-15 PROCEDURE — 97535 SELF CARE MNGMENT TRAINING: CPT

## 2020-02-15 PROCEDURE — 97116 GAIT TRAINING THERAPY: CPT

## 2020-02-15 PROCEDURE — 97166 OT EVAL MOD COMPLEX 45 MIN: CPT

## 2020-02-15 PROCEDURE — 97165 OT EVAL LOW COMPLEX 30 MIN: CPT

## 2020-02-15 PROCEDURE — 74011000258 HC RX REV CODE- 258: Performed by: ORTHOPAEDIC SURGERY

## 2020-02-15 PROCEDURE — 85025 COMPLETE CBC W/AUTO DIFF WBC: CPT

## 2020-02-15 PROCEDURE — 74011250637 HC RX REV CODE- 250/637: Performed by: ORTHOPAEDIC SURGERY

## 2020-02-15 PROCEDURE — 97110 THERAPEUTIC EXERCISES: CPT

## 2020-02-15 PROCEDURE — 74011250636 HC RX REV CODE- 250/636: Performed by: ORTHOPAEDIC SURGERY

## 2020-02-15 RX ADMIN — Medication 5 TABLET: at 09:31

## 2020-02-15 RX ADMIN — Medication 5 ML: at 14:21

## 2020-02-15 RX ADMIN — LOSARTAN POTASSIUM 25 MG: 25 TABLET ORAL at 17:29

## 2020-02-15 RX ADMIN — PANTOPRAZOLE SODIUM 40 MG: 40 TABLET, DELAYED RELEASE ORAL at 05:36

## 2020-02-15 RX ADMIN — ASPIRIN 325 MG ORAL TABLET 325 MG: 325 PILL ORAL at 09:31

## 2020-02-15 RX ADMIN — MONTELUKAST 10 MG: 10 TABLET, FILM COATED ORAL at 09:31

## 2020-02-15 RX ADMIN — SODIUM CHLORIDE 1000 MG: 900 INJECTION, SOLUTION INTRAVENOUS at 05:35

## 2020-02-15 RX ADMIN — Medication 1 TABLET: at 12:34

## 2020-02-15 RX ADMIN — Medication 1 TABLET: at 09:31

## 2020-02-15 RX ADMIN — OXYCODONE HYDROCHLORIDE 10 MG: 5 TABLET ORAL at 09:32

## 2020-02-15 RX ADMIN — Medication 5 ML: at 22:17

## 2020-02-15 RX ADMIN — Medication 5 ML: at 05:36

## 2020-02-15 RX ADMIN — LORATADINE 10 MG: 10 TABLET ORAL at 09:33

## 2020-02-15 RX ADMIN — OXYCODONE HYDROCHLORIDE 10 MG: 5 TABLET ORAL at 14:21

## 2020-02-15 RX ADMIN — LOSARTAN POTASSIUM 25 MG: 25 TABLET ORAL at 09:32

## 2020-02-15 RX ADMIN — LEVOTHYROXINE SODIUM 50 MCG: 0.05 TABLET ORAL at 05:35

## 2020-02-15 RX ADMIN — PANTOPRAZOLE SODIUM 40 MG: 40 TABLET, DELAYED RELEASE ORAL at 17:29

## 2020-02-15 RX ADMIN — Medication 5 ML: at 14:22

## 2020-02-15 RX ADMIN — CEFPODOXIME PROXETIL 200 MG: 200 TABLET, FILM COATED ORAL at 09:31

## 2020-02-15 RX ADMIN — OXYCODONE HYDROCHLORIDE 10 MG: 5 TABLET ORAL at 22:17

## 2020-02-15 RX ADMIN — Medication 1 TABLET: at 17:29

## 2020-02-15 NOTE — PROGRESS NOTES
Problem: Mobility Impaired (Adult and Pediatric)  Goal: *Therapy Goal (Edit Goal, Insert Text)  Outcome: Progressing Towards Goal  Note:   LEFT LE WBAT   STG:  (1.)Ms. Minnette Holstein will move from supine to sit and sit to supine , scoot up and down, and roll side to side with CONTACT GUARD ASSIST within 4 treatment day(s). (2.)Ms. Minnette Holstein will transfer from bed to chair and chair to bed with CONTACT GUARD ASSIST using the least restrictive device within 4 treatment day(s). (3.)Ms. Minnette Holstein will ambulate with CONTACT GUARD ASSIST for 200 feet with the least restrictive device within 4 treatment day(s). LTG:  (1.)Ms. Minnette Holstein will move from supine to sit and sit to supine , scoot up and down, and roll side to side in bed with STAND BY ASSIST within 7 treatment day(s). (2.)Ms. Minnette Holstein will transfer from bed to chair and chair to bed with STAND BY ASSIST using the least restrictive device within 7 treatment day(s). (3.)Ms. Minnette Holstein will ambulate with STAND BY ASSIST for 500 feet with the least restrictive device within 7 treatment day(s). ________________________________________________________________________________________________       PHYSICAL THERAPY: Initial Assessment and PM 2/15/2020  INPATIENT: PT Visit Days : 1  [place] / [place] / Product Type: Medicare /       NAME/AGE/GENDER: Madison Phillips is a 82 y.o. female   PRIMARY DIAGNOSIS: Closed left hip fracture ([facility] [S72.002A]  MDS (myelodysplastic syndrome) ([facility] [D46.9] Closed left hip fracture ([facility] Closed left hip fracture (HCC)  Procedure(s) (LRB):  LEFT HIP PERCUTANEOUS PINNING CANNULATED SCREWS (Left)  1 Day Post-Op  ICD-10: Treatment Diagnosis:    Generalized Muscle Weakness (M62.81)  Other lack of cordination (R27.8)  Difficulty in walking, Not elsewhere classified (R26.2)  Other abnormalities of gait and mobility (R26.89)   Precaution/Allergies:  Bactrim [sulfamethoprim];  Levaquin [levofloxacin]; and Tramadol      ASSESSMENT:     Ms. Eusebio Martinez is a pleasant frail elderly female with above diagnosis and undergoing chemotherapy treatments who demonstrates with decreased transfers, ambulation and mobility below her prior functional baseline. All transfers are currently limited at minimal assistance x 1 out of bed to sit and stand followed by ambulation and gait training with 2 wheel rolling walker for 15 feet with the deficits stated below to bedside commode and then to the bedside chair. Skilled PT is indicated for this patient's functional mobility deficits. Patient requires cues to perform exercises correctly. Overall good progress towards physical therapy goals is anticipated. Will continue efforts as patient is still below functional baseline. ?????? ? ? This section established at most recent assessment??????????   PROBLEM LIST (Impairments causing functional limitations):  Decreased Strength affecting function   Decreased Transfer Abilities   Decreased Ambulation Ability/Technique   Decreased Balance   Decreased Activity Tolerance   Decreased Pacing Skills   Increased Fatigue affecting function   Decreased Flexibility/Joint Mobility   Decreased [place] with Home Exercise Program   REHABILITATION POTENTIAL FOR STATED GOALS: GOOD  PLAN OF CARE:INTERVENTIONS PLANNED: (Benefits and precautions of physical therapy have been discussed with the patient.)  balance exercise   bed mobility   family education   gait training   range of motion: active/assisted/passive   therapeutic activities   therapeutic exercise/strengthening   transfer training   FREQUENCY/DURATION: Follow patient 3 x's per week for until goals are met in order to address above goals. REHAB RECOMMENDATIONS (at time of discharge pending progress):    Placement: It is my opinion, based on this patient's performance to date, that Ms. Eusebio Martinez may benefit from intensive therapy at a [address] [place] [address] after discharge due to the functional deficits listed above that are likely to improve with skilled rehabilitation and concerns that he/she may be unsafe to be unsupervised at home due to recent falls and debility. .  Equipment:   None at this time              HISTORY:   History of Present Injury/Illness (Reason for Referral): PER MD H&P   Campos Black is a 82 y.o. female who fell on her left lower extremity from a standing height. She had just left a hospital clinic getting an injection. She denies any other problems really besides her left lower extremity. She says she has some soreness in her left shoulder but this is the only other thing that she has noticed. She is able to move her left shoulder around very well and she does seem to think that the pain is rather minimal.  Past Medical History/Comorbidities:   Ms. Benny Wang  has a past medical history of Aplastic anemia ([facility] (2/10/2017), Asthma, Bronchogenic cyst (2/10/2017), CHF (congestive heart failure) ([facility] (2/10/2017), Chronic cough (2/10/2017), Chronic cystitis (2/10/2017), Hyperlipidemia (2/10/2017), Hypertension (2/10/2017), Hypothyroidism (2/10/2017), IBS (irritable bowel syndrome) (2/9/2017), Mediastinal tumor (2/9/2017), Myelodysplasia (myelodysplastic syndrome) ([facility] (2/10/2017), and Pneumonia. Ms. Benny Wang  has a past surgical history that includes hx cholecystectomy; hx tonsillectomy; hx hysterectomy; hx other surgical (Left); hx hernia repair; and hx cyst removal.  Social History/Living Environment:   Home Environment: Private residence  # Steps to Enter: 4  One/Two Story Residence: One story  Living Alone: No  Support Systems: Family member(s)  Patient Expects to be Discharged to[de-identified] Rehabilitation facility  Current DME Used/Available at Home: [employer] Dynes, rolling, Walker, rollator, Commode, bedside  Prior Level of Function/Work/Activity:  Had been limited with recent co-morbidities and treatments.     Dominant Side:         RIGHT    Personal Factors: Sex:  female        Age:  82 y.o. Number of Personal Factors/Comorbidities that affect the Plan of Care: 0: LOW COMPLEXITY   EXAMINATION:   Most Recent Physical Functioning:   Gross Assessment:  AROM: Generally decreased, functional  Strength: Generally decreased, functional  Coordination: Generally decreased, functional  Tone: Normal  Sensation: Intact               Posture:     Balance:  Sitting: Intact  Standing: Impaired  Standing - Static: Fair  Standing - Dynamic : Fair Bed Mobility:  Rolling: Minimum assistance  Supine to Sit: Minimum assistance  Sit to Supine: Minimum assistance  Scooting: Minimum assistance  Wheelchair Mobility:     Transfers:  Sit to Stand: Minimum assistance  Stand to Sit: Minimum assistance  Bed to Chair: Minimum assistance  Gait:  Left Side Weight Bearing: As tolerated  Base of Support: Center of gravity altered  Speed/Fidelia: Delayed; Fluctuations; Pace decreased (<100 feet/min); Shuffled; Slow  Step Length: Left shortened  Swing Pattern: Left asymmetrical  Stance: Left decreased;Time;Weight shift  Gait Abnormalities: Decreased step clearance;Shuffling gait; Steppage gait; Step to gait;Trunk sway increased  Distance (ft): 15 Feet (ft)  Assistive Device: Walker, rolling  Ambulation - Level of Assistance: Minimal assistance  Interventions: Manual cues; Safety awareness training; Tactile cues; Verbal cues; Visual/Demos      Body Structures Involved:  Bones  Joints  Muscles  Ligaments Body Functions Affected:  Neuromusculoskeletal  Movement Related  Skin Related  Metobolic/Endocrine Activities and Participation Affected:  Communication  Mobility  [address] [facility] [address] [facility]   Number of elements that affect the Plan of Care: 1-2: LOW COMPLEXITY   CLINICAL PRESENTATION:   Presentation: Stable and uncomplicated: LOW COMPLEXITY   CLINICAL DECISION MAKIN [facility] [address] [address] AM-PAC 6 Clicks   Basic Mobility Inpatient Short Form  How much difficulty does the patient currently have... Unable A Lot A Little None   1. Turning over in bed (including adjusting bedclothes, sheets and blankets)? [] 1   [] 2   [x] 3   [] 4   2. Sitting down on and standing up from a chair with arms ( e.g., wheelchair, bedside commode, etc.)   [] 1   [] 2   [x] 3   [] 4   3. Moving from lying on back to sitting on the side of the bed? [] 1   [] 2   [x] 3   [] 4   How much help from another person does the patient currently need. .. Total A Lot A Little None   4. Moving to and from a bed to a chair (including a wheelchair)? [] 1   [] 2   [x] 3   [] 4   5. Need to walk in hospital room? [] 1   [] 2   [x] 3   [] 4   6. Climbing 3-5 steps with a railing? [] 1   [] 2   [x] 3   [] 4   © 2007, Trustees of [address] [place] [address] [address], under license to [employer]. All rights reserved      Score:  Initial: 18 Most Recent: X (Date: -- )    Interpretation of Tool:  Represents activities that are increasingly more difficult (i.e. Bed mobility, Transfers, Gait). Medical Necessity:     Patient demonstrates   good   rehab potential due to higher previous functional level. Reason for Services/Other Comments:  Patient continues to require skilled intervention due to   S/p surgical left LE. Mliss Reyes Use of outcome tool(s) and clinical judgement create a POC that gives a: Clear prediction of patient's progress: LOW COMPLEXITY            TREATMENT:   (In addition to Assessment/Re-Assessment sessions the following treatments were rendered)   Pre-treatment Symptoms/Complaints:  left hip post surgical hip.   3  Pain: Initial:   Pain Intensity 1: 3  Pain Location 1: Hip  Pain Orientation 1: Left  Pain Intervention(s) 1: Repositioned  Post Session:  3/10 in the left hip. Gait Training ( 8 mins):  Gait training to improve and/or restore physical functioning as related to mobility, strength, balance, and coordination. Ambulated 15 Feet (ft) with Minimal assistance using a Walker, rolling and moderate Manual cues; Safety awareness training; Tactile cues; Verbal cues; Visual/Demos related to their stance phase and stride length to promote proper body alignment and promote proper body posture. Braces/Orthotics/Lines/Etc:   O2 Device: Nasal cannula  Treatment/Session Assessment:    Response to Treatment:  improved mobility and transfers. Interdisciplinary Collaboration:   Physical Therapist  Registered Nurse  After treatment position/precautions:   Up in chair  Bed alarm/tab alert on  Bed/Chair-wheels locked  Bed in low position  Call light within reach  Side rails x 3   Compliance with Program/Exercises: Will assess as treatment progresses  Recommendations/Intent for next treatment session: \"Next visit will focus on advancements to more challenging activities, reduction in assistance provided, and transfers, ambulation and mobility with therapeutic exercise left LE.   WBAT. \".   Total Treatment Duration:  PT Patient Time In/Time Out  Time In: 1202  Time Out: [address] [place] Deyanira Platt

## 2020-02-15 NOTE — PROGRESS NOTES
Problem: Pressure Injury - Risk of  Goal: *Prevention of pressure injury  Description  Document Chuck Scale and appropriate interventions in the flowsheet. Outcome: Progressing Towards Goal  Note: Pressure Injury Interventions:  Sensory Interventions: Assess changes in LOC, Avoid rigorous massage over bony prominences    Moisture Interventions: Absorbent underpads, Check for incontinence Q2 hours and as needed    Activity Interventions: Increase time out of bed, Pressure redistribution bed/mattress(bed type), PT/OT evaluation    Mobility Interventions: HOB 30 degrees or less, Pressure redistribution bed/mattress (bed type), PT/OT evaluation    Nutrition Interventions: Document food/fluid/supplement intake, Offer support with meals,snacks and hydration    Friction and Shear Interventions: HOB 30 degrees or less                Problem: Falls - Risk of  Goal: *Absence of Falls  Description  Document Amarjit Fall Risk and appropriate interventions in the flowsheet.   Outcome: Progressing Towards Goal  Note: Fall Risk Interventions:  Mobility Interventions: Communicate number of staff needed for ambulation/transfer, Bed/chair exit alarm, Patient to call before getting OOB         Medication Interventions: Bed/chair exit alarm, Patient to call before getting OOB, Teach patient to arise slowly    Elimination Interventions: Bed/chair exit alarm, Call light in reach, Patient to call for help with toileting needs, Stay With Me (per policy), Toileting schedule/hourly rounds, Toilet paper/wipes in reach    History of Falls Interventions: Bed/chair exit alarm, Consult care management for discharge planning, Door open when patient unattended, Investigate reason for fall         Problem: Pain  Goal: *Control of Pain  Outcome: Progressing Towards Goal

## 2020-02-15 NOTE — PROGRESS NOTES
Problem: Pressure Injury - Risk of  Goal: *Prevention of pressure injury  Description  Document Chuck Scale and appropriate interventions in the flowsheet. Outcome: Progressing Towards Goal  Note: Pressure Injury Interventions:  Sensory Interventions: Assess changes in LOC, Avoid rigorous massage over bony prominences    Moisture Interventions: Absorbent underpads, Check for incontinence Q2 hours and as needed    Activity Interventions: Increase time out of bed, Pressure redistribution bed/mattress(bed type)    Mobility Interventions: HOB 30 degrees or less, Pressure redistribution bed/mattress (bed type)    Nutrition Interventions: Offer support with meals,snacks and hydration, Document food/fluid/supplement intake    Friction and Shear Interventions: HOB 30 degrees or less                Problem: Patient Education: Go to Patient Education Activity  Goal: Patient/Family Education  Outcome: Progressing Towards Goal     Problem: Falls - Risk of  Goal: *Absence of Falls  Description  Document Amarjit Fall Risk and appropriate interventions in the flowsheet.   Outcome: Progressing Towards Goal  Note: Fall Risk Interventions:  Mobility Interventions: Bed/chair exit alarm, Communicate number of staff needed for ambulation/transfer, Patient to call before getting OOB         Medication Interventions: Patient to call before getting OOB, Teach patient to arise slowly, Bed/chair exit alarm    Elimination Interventions: Bed/chair exit alarm, Call light in reach, Patient to call for help with toileting needs, Toileting schedule/hourly rounds    History of Falls Interventions: Bed/chair exit alarm, Investigate reason for fall         Problem: Patient Education: Go to Patient Education Activity  Goal: Patient/Family Education  Outcome: Progressing Towards Goal     Problem: Patient Education: Go to Patient Education Activity  Goal: Patient/Family Education  Outcome: Progressing Towards Goal     Problem: Hip Fracture:Day of Admission Pre-op  Goal: Off Pathway (Use only if patient is Off Pathway)  Outcome: Progressing Towards Goal  Goal: Activity/Safety  Outcome: Progressing Towards Goal  Goal: Consults, if ordered  Outcome: Progressing Towards Goal  Goal: Diagnostic Test/Procedures  Outcome: Progressing Towards Goal  Goal: Nutrition/Diet  Outcome: Progressing Towards Goal  Goal: Medications  Outcome: Progressing Towards Goal  Goal: Respiratory  Outcome: Progressing Towards Goal  Goal: Treatments/Interventions/Procedures  Outcome: Progressing Towards Goal  Goal: Psychosocial  Outcome: Progressing Towards Goal  Goal: *Labs/Tests Within Defined Limits in Preparation for Surgery  Outcome: Progressing Towards Goal  Goal: *Optimal pain control at patient's stated goal  Outcome: Progressing Towards Goal  Goal: *Hemodynamically stable  Outcome: Progressing Towards Goal     Problem: Pain  Goal: *Control of Pain  Outcome: Progressing Towards Goal     Problem: Patient Education: Go to Patient Education Activity  Goal: Patient/Family Education  Outcome: Progressing Towards Goal

## 2020-02-15 NOTE — PROGRESS NOTES
[facility] CARDIOLOGY PROGRESS NOTE           2/15/2020 10:42 AM    Admit Date: 2/13/2020      Subjective:   No complaints. ROS:  GEN:  No fever or chills  Cardiovascular:  As noted above:no Chest pain or palpitations. Pulmonary:  As noted above:no SOB. Neuro:  No new focal motor or sensory loss    Objective:      Vitals:    02/14/20 2014 02/15/20 0016 02/15/20 0455 02/15/20 0730   BP: 150/66 124/66 114/62 122/73   Pulse: 78 73 66 72   Resp: 16 16 16 18   Temp: 98.3 °F (36.8 °C) 98.1 °F (36.7 °C) 98.7 °F (37.1 °C) 98.2 °F (36.8 °C)   SpO2: 92% 94% 95% 94%   Weight:       Height:           Physical Exam:  General-no distress  Neck- supple, no JVD  CV- regular rate and rhythm no MRG  Lung- clear bilaterally  Abd- soft, nontender, nondistended  Ext- no edema bilaterally. Skin- warm and dry  Psychiatric:  Normal mood and affect. Neurologic:  Alert and oriented X 3      Data Review:   Recent Labs     02/15/20  0607 02/14/20  1505 02/14/20  0318 02/13/20  1546   NA  --   --  142 138   K  --   --  3.6 3.9   BUN  --   --  34* 37*   CREA  --   --  1.38* 1.49*   GLU  --   --  90 90   WBC 4.3 6.4 5.1 8.8   HGB 8.6* 8.9* 7.1* 7.6*   HCT 27.0* 27.9* 22.4* 23.7*   * 133* 183 208   INR  --   --   --  1.0       TELEMETRY: n/a    Assessment/Plan:     Principal Problem:    Closed left hip fracture (HCC) (2/13/2020):s/p Surgery :stable. Active Problems:      MDS (myelodysplastic syndrome) ([facility]) (2/13/2020)      CKD (chronic kidney disease) stage 3, GFR 30-59 ml/min ([facility]) (2/13/2020)      Pulmonary HTN ([facility] (2/13/2020): Mild. Chronic diastolic congestive heart failure ([address] (2/13/2020): Stable. .Continue current medications. Repeat showed normal LV EF and mild pulmonary hypertension. No additional recommendations at present time. CV status appears stable post op. Will sign off.                 Raven Estrella MD  2/15/2020 10:42 AM

## 2020-02-15 NOTE — PROGRESS NOTES
Hospitalist Progress Note     Admit Date:  2020  6:06 PM   Name:  Kaiser Linares   Age:  82 y.o.  :  1935   MRN:  [id number]   PCP:  Kayley Chauhan MD  Treatment Team: Attending Provider: Arline Martin MD; Consulting Provider: Dina Casanova MD; Utilization Review: Cornelio Henriquez; Physical Therapist: Nikita Patricia, PT; Occupational Therapist: Leyla Pedroza OTR/L; Physical Therapy Assistant: Annelle Peabody, PTA    HPI:   CC: Syncope following Aranesp infusion      Subjective:     She is an 82-year-old female with a past medical history significant for myelodysplastic syndrome on Aranesp every 2 to 3 weeks. Patient's most recent dose was on . She had her infusion as normal but when she got up to leave she became quite dizzy and this caused her to fall and fractured her left hip. Patient initially presented at [address] [place] [address] and decision made to transfer her to [facility] for definitive treatment. Orthopedics was consulted and saw patient, took her to surgery on  for a percutaneous pinning with cannulated screws. Tolerated procedure well. Spoke with this patient regarding discharge planning and she is being very realistic and recognizes she needs to go to short-term rehab. Patient also seen by cardiology who had no additional recommendations at this time.     Objective:     Patient Vitals for the past 24 hrs:   Temp Pulse Resp BP SpO2   02/15/20 1054 98.2 °F (36.8 °C) 69 18 124/63 96 %   02/15/20 0730 98.2 °F (36.8 °C) 72 18 122/73 94 %   02/15/20 0455 98.7 °F (37.1 °C) 66 16 114/62 95 %   02/15/20 0016 98.1 °F (36.7 °C) 73 16 124/66 94 %   20 98.3 °F (36.8 °C) 78 16 150/66 92 %   20 1733     98 %   20 1705  68  151/60 97 %   20 1650  75  159/83 97 %   20 1635  76  165/51 96 %   20 1620  72  145/69 95 %   20 1605 98 °F (36.7 °C) 68 16 153/57 98 %   20 1546  64      20 [id number] [address]     Oxygen Therapy  O2 Sat (%): 96 % (02/15/20 1054)  Pulse via Oximetry: 68 beats per minute (02/14/20 1733)  O2 Device: Nasal cannula (02/14/20 1733)  O2 Flow Rate (L/min): 1 l/min (02/14/20 1733)    Intake/Output Summary (Last 24 hours) at 2/15/2020 1500  Last data filed at 2/15/2020 5156  Gross per 24 hour   Intake    Output 1425 ml   Net -1425 ml         REVIEW OF SYSTEMS: Comprehensive ROS performed and negative except as stated in HPI. Physical Examination:  General:    Well nourished. Awake and alert. Head:  Normocephalic, atraumatic  Eyes:  Extraocular movements intact, normal sclera  CV:   RRR. No  Murmurs, clicks, or gallops  Lungs:   Unlabored, no cyanosis  Abdomen:   Soft, nondistended, nontender. Extremities: Warm and dry. No cyanosis or edema. LLE with surgical dressing in place, CDI  Skin:     No rashes or jaundice. Neuro:  No gross focal deficits  Psych:  Mood and affect appropriate    Data Review:  I have reviewed all labs, meds, telemetry events, and studies from the last 24 hours. Recent Results (from the past 24 hour(s))   CBC WITH AUTOMATED DIFF    Collection Time: 02/14/20  3:05 PM   Result Value Ref Range    WBC 6.4 4.3 - 11.1 K/uL    RBC 2.78 (L) 4.05 - 5.2 M/uL    HGB 8.9 (L) 11.7 - 15.4 g/dL    HCT 27.9 (L) 35.8 - 46.3 %    .4 (H) 79.6 - 97.8 FL    MCH 32.0 26.1 - 32.9 PG    MCHC 31.9 31.4 - 35.0 g/dL    RDW 21.0 (H) 11.9 - 14.6 %    PLATELET 948 (L) 190 - 450 K/uL    MPV 13.5 (H) 9.4 - 12.3 FL    ABSOLUTE NRBC 0.02 0.0 - 0.2 K/uL    NEUTROPHILS 54 43 - 78 %    LYMPHOCYTES 36 13 - 44 %    MONOCYTES 7 4.0 - 12.0 %    EOSINOPHILS 1 0.5 - 7.8 %    BASOPHILS 0 0.0 - 2.0 %    IMMATURE GRANULOCYTES 2 0.0 - 5.0 %    ABS. NEUTROPHILS 3.5 1.7 - 8.2 K/UL    ABS. LYMPHOCYTES 2.3 0.5 - 4.6 K/UL    ABS. MONOCYTES 0.4 0.1 - 1.3 K/UL    ABS. EOSINOPHILS 0.1 0.0 - 0.8 K/UL    ABS. BASOPHILS 0.0 0.0 - 0.2 K/UL    ABS. IMM.  GRANS. 0.1 0.0 - 0.5 K/UL    RBC COMMENTS SLIGHT  ANISOCYTOSIS + POIKILOCYTOSIS        WBC COMMENTS Result Confirmed By Smear      PLATELET COMMENTS ADEQUATE      DF AUTOMATED     PLEASE READ & DOCUMENT PPD TEST IN 24 HRS    Collection Time: 02/14/20  6:36 PM   Result Value Ref Range    PPD Negative Negative    mm Induration 0 0 - 5 mm   CBC WITH AUTOMATED DIFF    Collection Time: 02/15/20  6:07 AM   Result Value Ref Range    WBC 4.3 4.3 - 11.1 K/uL    RBC 2.66 (L) 4.05 - 5.2 M/uL    HGB 8.6 (L) 11.7 - 15.4 g/dL    HCT 27.0 (L) 35.8 - 46.3 %    .5 (H) 79.6 - 97.8 FL    MCH 32.3 26.1 - 32.9 PG    MCHC 31.9 31.4 - 35.0 g/dL    RDW 22.3 (H) 11.9 - 14.6 %    PLATELET 198 (L) 414 - 450 K/uL    MPV 13.4 (H) 9.4 - 12.3 FL    ABSOLUTE NRBC 0.00 0.0 - 0.2 K/uL    DF AUTOMATED      NEUTROPHILS 42 (L) 43 - 78 %    LYMPHOCYTES 42 13 - 44 %    MONOCYTES 14 (H) 4.0 - 12.0 %    EOSINOPHILS 1 0.5 - 7.8 %    BASOPHILS 0 0.0 - 2.0 %    IMMATURE GRANULOCYTES 1 0.0 - 5.0 %    ABS. NEUTROPHILS 1.8 1.7 - 8.2 K/UL    ABS. LYMPHOCYTES 1.8 0.5 - 4.6 K/UL    ABS. MONOCYTES 0.6 0.1 - 1.3 K/UL    ABS. EOSINOPHILS 0.1 0.0 - 0.8 K/UL    ABS. BASOPHILS 0.0 0.0 - 0.2 K/UL    ABS. IMM.  GRANS. 0.0 0.0 - 0.5 K/UL        All Micro Results     Procedure Component Value Units Date/Time    CULTURE, URINE [[id number]] Collected:  02/14/20 1020    Order Status:  Completed Specimen:  [place] Specimen Updated:  02/15/20 0924     Special Requests: NO SPECIAL REQUESTS        Culture result: NO GROWTH 1 DAY       CULTURE, URINE [[id number]]     Order Status:  Canceled Specimen:  Clean catch           Current Meds:  Current Facility-Administered Medications   Medication Dose Route Frequency    influenza vaccine 2019-20 (6 mos+)(PF) (FLUARIX/FLULAVAL/FLUZONE QUAD) injection 0.5 mL  0.5 mL IntraMUSCular PRIOR TO DISCHARGE    cefpodoxime (VANTIN) tablet 200 mg  200 mg Oral DAILY    pantoprazole (PROTONIX) tablet 40 mg  40 mg Oral ACB&D    cloNIDine HCL (CATAPRES) tablet 0.2 mg  0.2 mg Oral BID PRN  hydrALAZINE (APRESOLINE) 20 mg/mL injection 10 mg  10 mg IntraVENous Q6H PRN    0.9% sodium chloride infusion 250 mL  250 mL IntraVENous PRN    0.9% sodium chloride infusion 250 mL  250 mL IntraVENous PRN    sodium chloride (NS) flush 5-40 mL  5-40 mL IntraVENous Q8H    sodium chloride (NS) flush 5-40 mL  5-40 mL IntraVENous PRN    ondansetron (ZOFRAN) injection 4 mg  4 mg IntraVENous Q4H PRN    alum-mag hydroxide-simeth (MYLANTA) oral suspension 30 mL  30 mL Oral Q4H PRN    calcium-vitamin D (OS-ZOILA) 500 mg-200 unit tablet  1 Tab Oral TID WITH MEALS    morphine injection 5 mg  5 mg IntraVENous Q4H PRN    oxyCODONE IR (ROXICODONE) tablet 10 mg  10 mg Oral Q3H PRN    aspirin tablet 325 mg  325 mg Oral DAILY    metoprolol (LOPRESSOR) injection 5 mg  5 mg IntraVENous Q6H PRN    levothyroxine (SYNTHROID) tablet 50 mcg  50 mcg Oral 6am    losartan (COZAAR) tablet 25 mg  25 mg Oral BID    montelukast (SINGULAIR) tablet 10 mg  10 mg Oral DAILY    sodium chloride (NS) flush 5-40 mL  5-40 mL IntraVENous Q8H    sodium chloride (NS) flush 5-40 mL  5-40 mL IntraVENous PRN    acetaminophen (TYLENOL) tablet 650 mg  650 mg Oral Q4H PRN    naloxone (NARCAN) injection 0.4 mg  0.4 mg IntraVENous PRN    diphenhydrAMINE (BENADRYL) injection 12.5 mg  12.5 mg IntraVENous Q4H PRN    cholecalciferol (VITAMIN D3) (400 Units /10 mcg) tablet 5 Tab  2,000 Units Oral DAILY    aspirin chewable tablet 81 mg  81 mg Oral DAILY    nitroglycerin (NITROSTAT) tablet 0.4 mg  0.4 mg SubLINGual PRN    loratadine (CLARITIN) tablet 10 mg  10 mg Oral DAILY       Diet:  DIET REGULAR    Body mass index is 22.3 kg/m². Other Studies (last 24 hours):  No results found.     Assessment and Plan:     Hospital Problems as of 2/15/2020 Date Reviewed: 2/14/2020          Codes Class Noted - Resolved POA    MDS (myelodysplastic syndrome) ([facility] ICD-10-CM: D46.9  ICD-9-CM: 238.75  2/13/2020 - Present Unknown        * (Principal) Closed left hip fracture [facility]) ICD-10-CM: S72.002A  ICD-9-CM: 820.8  2/13/2020 - Present Unknown        CKD (chronic kidney disease) stage 3, GFR 30-59 ml/min (HCC) ICD-10-CM: N18.3  ICD-9-CM: 585.3  2/13/2020 - Present Unknown        Pulmonary HTN (HCC) ICD-10-CM: I27.20  ICD-9-CM: 416.8  2/13/2020 - Present Unknown        Chronic diastolic congestive heart failure (HCC) ICD-10-CM: I50.32  ICD-9-CM: 428.32, 428.0  2/13/2020 - Present Unknown        Dizziness ICD-10-CM: R42  ICD-9-CM: 780.4  2/13/2020 - Present Unknown        Hypothyroidism ICD-10-CM: E03.9  ICD-9-CM: 244.9  2/10/2017 - Present Yes        Hyperlipidemia ICD-10-CM: E78.5  ICD-9-CM: 272.4  2/10/2017 - Present Yes        Hypertension ICD-10-CM: I10  ICD-9-CM: 401.9  2/10/2017 - Present Yes        Chronic cough ICD-10-CM: R05  ICD-9-CM: 786.2  2/10/2017 - Present Yes              A/P:    Left hip fracture  -ORIF 2/14  -PT OT postop  - mg every day x 10 days    Congestive diastolic heart failure  -Cardiology consult  --Repeat echo with normal systolic function, ejection fraction 60 to 65%, no wall motion abnormalities, systolic function was also normal, with mild pulmonary artery hypertension    Pulmonary hypertension  -Per above    Acute UTI  -P.o.  Vantin    Hypertension  -Continue home losartan  -PRN Catapres and hydralazine    CKD stage III  -Monitor creatinine    Anemia  -Monitor H&H closely operatively    DC planning/Dispo: Short-term rehab  DVT ppx:      Code status:  Full  Medical decision maker: None    Case reviewed with supervising physician - Adiel Bustos MD    Signed:  Kelly Mcfadden, NP-C

## 2020-02-15 NOTE — PROGRESS NOTES
CM met with patient and  at bedside to discus SNF options. Pt and  chose 1) [facility] 2) [place] [facility] 3) [address] [place] [address] in this order. CM will continue to monitor.

## 2020-02-15 NOTE — PROGRESS NOTES
Problem: Self Care Deficits Care Plan (Adult)  Goal: *Acute Goals and Plan of Care (Insert Text)  Description  1. Patient will maintain WBAT status in LLE for 100% of treatment session and no verbal cues from therapist.   2. Patient will complete functional transfers with supervision while maintaining WBAT status in LLE and with adaptive equipment as needed. 3. Patient will complete lower body bathing and dressing with minimal assistance and adaptive equipment as needed. 4. Patient will complete toileting and toilet transfer with supervision. 5. Patient will tolerate 20 minutes of OT treatment with as needed rest breaks to increase activity tolerance for ADLs. 6. Patient will participate in at least 20 minutes of BUE therapeutic exercises to strengthen BUE for functional transfers. Timeframe: 7 visits      Outcome: Progressing Towards Goal       OCCUPATIONAL THERAPY: Initial Assessment and Daily Note 2/15/2020  INPATIENT: OT Visit Days: 1  [place] / Product Type: Medicare /      NAME/AGE/GENDER: Tami Le is a 82 y.o. female   PRIMARY DIAGNOSIS:  Closed left hip fracture ([facility] [S72.002A]  MDS (myelodysplastic syndrome) ([facility] [D46.9] Closed left hip fracture ([facility] Closed left hip fracture (HCC)  Procedure(s) (LRB):  LEFT HIP PERCUTANEOUS PINNING CANNULATED SCREWS (Left)  1 Day Post-Op  ICD-10: Treatment Diagnosis:    Pain in left hip (M25.552)  Stiffness of Left Hip, Not elsewhere classified (M25.652)  History of falling (Z91.81)   Precautions/Allergies:    WBAT LLE Bactrim [sulfamethoprim]; Levaquin [levofloxacin]; and Tramadol      ASSESSMENT:     Ms. Deepthi Anglin is an 82year old female admitted after fall at [facility] while getting an infusion. Now sp above procedure and WBAT in LLE. At baseline she lives with her daughters and is typically independent with ADLs and driving. She uses either a rollator or RW.  She enjoys doing chair exercise class 2x/ week. Patient agreeable to OT evaluation. [place] but appears alert and oriented. Reports pain 3/10 in L hip. BUE assessment reveals ROM is WNL, strength is [facility] and coordination is [facility]. Balance is intact in sitting, impaired in standing (fair). Treatment initiated to include ADL activity: toileting with minimal assistance and BSC transfer with minimal assistance, RW, and verbal cues for safety and technique. Patient is currently functioning below her baseline and would benefit from continued occupational therapy to increase independence and safety. Will follow. This section established at most recent assessment   PROBLEM LIST (Impairments causing functional limitations):  Decreased Strength  Decreased ADL/Functional Activities  Decreased Transfer Abilities  Decreased Ambulation Ability/Technique  Decreased Balance  Increased Pain  Decreased Activity Tolerance  Decreased Flexibility/Joint Mobility  Decreased Knowledge of Precautions   INTERVENTIONS PLANNED: (Benefits and precautions of occupational therapy have been discussed with the patient.)  Activities of daily living training  Adaptive equipment training  Therapeutic activity  Therapeutic exercise     TREATMENT PLAN: Frequency/Duration: Follow patient 6x/ week to address above goals. Rehabilitation Potential For Stated Goals: Good     REHAB RECOMMENDATIONS (at time of discharge pending progress):    Placement: It is my opinion, based on this patient's performance to date, that Ms. Minnette Holstein may benefit from intensive therapy at a [address] [place] [address] after discharge due to the functional deficits listed above that are likely to improve with skilled rehabilitation.   Equipment:   None at this time              OCCUPATIONAL PROFILE AND HISTORY:   History of Present Injury/Illness (Reason for Referral):  S/p above procedure   Past Medical History/Comorbidities:   Ms. Minnette Holstein  has a past medical history of Aplastic anemia ([facility] (2/10/2017), Asthma, Bronchogenic cyst (2/10/2017), CHF (congestive heart failure) ([facility] (2/10/2017), Chronic cough (2/10/2017), Chronic cystitis (2/10/2017), Hyperlipidemia (2/10/2017), Hypertension (2/10/2017), Hypothyroidism (2/10/2017), IBS (irritable bowel syndrome) (2/9/2017), Mediastinal tumor (2/9/2017), Myelodysplasia (myelodysplastic syndrome) ([facility] (2/10/2017), and Pneumonia. Ms. Brad Lang  has a past surgical history that includes hx cholecystectomy; hx tonsillectomy; hx hysterectomy; hx other surgical (Left); hx hernia repair; and hx cyst removal.  Social History/Living Environment:   Home Environment: Private residence  # Steps to Enter: 4  One/Two Story Residence: One story  Living Alone: No  Support Systems: Family member(s)  Patient Expects to be Discharged to[de-identified] Rehabilitation facility  Current DME Used/Available at Home: Vonna Papa, rolling, Walker, rollator, Commode, bedside  Prior Level of Function/Work/Activity:  At baseline she lives with her daughters and is typically independent with ADLs and driving. She uses either a rollator or RW. She enjoys doing chair exercise class 2x/ week. Number of Personal Factors/Comorbidities that affect the Plan of Care: Brief history (0):  LOW COMPLEXITY   ASSESSMENT OF OCCUPATIONAL PERFORMANCE[de-identified]   Activities of Daily Living:   Basic ADLs (From Assessment) Complex ADLs (From Assessment)   Feeding: Setup  Oral Facial Hygiene/Grooming: Setup  Bathing: Moderate assistance  Upper Body Dressing: Minimum assistance  Lower Body Dressing: Maximum assistance  Toileting: Moderate assistance Instrumental ADL  Meal Preparation: Maximum assistance  Homemaking: Maximum assistance   Grooming/Bathing/Dressing Activities of Daily Living     Cognitive Retraining  Safety/Judgement: Awareness of environment; Fall prevention           Toileting  Toileting Assistance: Minimum assistance  Bladder Hygiene: Set-up  Clothing Management: Minimum assistance     Functional Transfers  Toilet Transfer : Minimum assistance  Adaptive Equipment: Bedside commode;Walker (comment)     Bed/Mat Mobility  Supine to Sit: Minimum assistance;Assist x2  Sit to Stand: Minimum assistance;Assist x2;Contact guard assistance  Stand to Sit: Minimum assistance  Scooting: Moderate assistance     Most Recent Physical Functioning:   Gross Assessment:  AROM: Within functional limits  Strength: Within functional limits               Posture:     Balance:  Sitting: Intact  Standing: Impaired  Standing - Static: Fair  Standing - Dynamic : Fair Bed Mobility:  Supine to Sit: Minimum assistance;Assist x2  Scooting: Moderate assistance  Wheelchair Mobility:     Transfers:  Sit to Stand: Minimum assistance;Assist x2;Contact guard assistance  Stand to Sit: Minimum assistance            Patient Vitals for the past 6 hrs:   BP BP Patient Position SpO2 Pulse   02/15/20 1054 124/63 At rest 96 % 69       Mental Status  Neurologic State: Alert  Orientation Level: Oriented X4  Cognition: Follows commands  Perception: Appears intact  Perseveration: No perseveration noted  Safety/Judgement: Awareness of environment, Fall prevention                          Physical Skills Involved:  Range of Motion  Balance  Strength  Activity Tolerance  Pain (acute) Cognitive Skills Affected (resulting in the inability to perform in a timely and safe manner):  NONE   Psychosocial Skills Affected:  Habits/Routines  Environmental Adaptation  Self-Awareness  Social Roles   Number of elements that affect the Plan of Care: 5+:  HIGH COMPLEXITY   CLINICAL DECISION MAKIN [facility] [address] [id number] AM-PAC 6 Clicks   Daily Activity Inpatient Short Form  How much help from another person does the patient currently need. .. Total A Lot A Little None   1. Putting on and taking off regular lower body clothing? [] 1   [x] 2   [] 3   [] 4   2. Bathing (including washing, rinsing, drying)? [] 1   [x] 2   [] 3   [] 4   3.   Toileting, which includes using toilet, bedpan or urinal?   [] 1   [x] 2   [] 3   [] 4   4. Putting on and taking off regular upper body clothing? [] 1   [] 2   [x] 3   [] 4   5. Taking care of personal grooming such as brushing teeth? [] 1   [] 2   [x] 3   [] 4   6. Eating meals? [] 1   [] 2   [x] 3   [] 4   © 2007, Trustees of [facility] [employer], under license to [employer]. All rights reserved      Score:  Initial: 15 Most Recent: X (Date: -- )    Interpretation of Tool:  Represents activities that are increasingly more difficult (i.e. Bed mobility, Transfers, Gait). Medical Necessity:     Patient demonstrates   good   rehab potential due to higher previous functional level. Reason for Services/Other Comments:  Patient   continues to require present interventions due to patient's inability to care for self at baseline level of function   . Use of outcome tool(s) and clinical judgement create a POC that gives a: MODERATE COMPLEXITY         TREATMENT:   (In addition to Assessment/Re-Assessment sessions the following treatments were rendered)     Pre-treatment Symptoms/Complaints:    Pain: Initial:   Pain Intensity 1: 3  Pain Intervention(s) 1: Ambulation/Increased Activity  Post Session:  same     Self Care: (8 minutes): Procedure(s) (per grid) utilized to improve and/or restore self-care/home management as related to toileting and bedside commode transfer . Required minimal verbal cueing to facilitate activities of daily living skills, compensatory activities, and safety awareness. Braces/Orthotics/Lines/Etc:   IV  O2 Device: Nasal cannula  Treatment/Session Assessment:    Response to Treatment:  tolerated well   Interdisciplinary Collaboration:   Physical Therapist  Occupational Therapist  Registered Nurse  After treatment position/precautions: With PT for PT assessment    Compliance with Program/Exercises: Will assess as treatment progresses. Recommendations/Intent for next treatment session:   \"Next visit will focus on advancements to more challenging activities and reduction in assistance provided\".   Total Treatment Duration:  OT Patient Time In/Time Out  Time In: 1141  Time Out: 40 MARY Singh/MARCELLE

## 2020-02-16 LAB
BACTERIA SPEC CULT: NORMAL
BASOPHILS # BLD: 0 K/UL (ref 0–0.2)
BASOPHILS NFR BLD: 0 % (ref 0–2)
DIFFERENTIAL METHOD BLD: ABNORMAL
EOSINOPHIL # BLD: 0.1 K/UL (ref 0–0.8)
EOSINOPHIL NFR BLD: 2 % (ref 0.5–7.8)
ERYTHROCYTE [DISTWIDTH] IN BLOOD BY AUTOMATED COUNT: 21.8 % (ref 11.9–14.6)
HCT VFR BLD AUTO: 27.9 % (ref 35.8–46.3)
HGB BLD-MCNC: 8.8 G/DL (ref 11.7–15.4)
IMM GRANULOCYTES # BLD AUTO: 0 K/UL (ref 0–0.5)
IMM GRANULOCYTES NFR BLD AUTO: 1 % (ref 0–5)
LYMPHOCYTES # BLD: 2.3 K/UL (ref 0.5–4.6)
LYMPHOCYTES NFR BLD: 50 % (ref 13–44)
MCH RBC QN AUTO: 31.9 PG (ref 26.1–32.9)
MCHC RBC AUTO-ENTMCNC: 31.5 G/DL (ref 31.4–35)
MCV RBC AUTO: 101.1 FL (ref 79.6–97.8)
MONOCYTES # BLD: 0.6 K/UL (ref 0.1–1.3)
MONOCYTES NFR BLD: 13 % (ref 4–12)
NEUTS SEG # BLD: 1.5 K/UL (ref 1.7–8.2)
NEUTS SEG NFR BLD: 33 % (ref 43–78)
NRBC # BLD: 0.02 K/UL (ref 0–0.2)
PLATELET # BLD AUTO: 154 K/UL (ref 150–450)
PMV BLD AUTO: 12.8 FL (ref 9.4–12.3)
RBC # BLD AUTO: 2.76 M/UL (ref 4.05–5.2)
SERVICE CMNT-IMP: NORMAL
WBC # BLD AUTO: 4.5 K/UL (ref 4.3–11.1)

## 2020-02-16 PROCEDURE — 85025 COMPLETE CBC W/AUTO DIFF WBC: CPT

## 2020-02-16 PROCEDURE — 77010033678 HC OXYGEN DAILY

## 2020-02-16 PROCEDURE — 74011250637 HC RX REV CODE- 250/637: Performed by: ORTHOPAEDIC SURGERY

## 2020-02-16 PROCEDURE — 65270000029 HC RM PRIVATE

## 2020-02-16 PROCEDURE — 51798 US URINE CAPACITY MEASURE: CPT

## 2020-02-16 PROCEDURE — 36415 COLL VENOUS BLD VENIPUNCTURE: CPT

## 2020-02-16 PROCEDURE — 94760 N-INVAS EAR/PLS OXIMETRY 1: CPT

## 2020-02-16 PROCEDURE — 97116 GAIT TRAINING THERAPY: CPT

## 2020-02-16 PROCEDURE — 97530 THERAPEUTIC ACTIVITIES: CPT

## 2020-02-16 RX ADMIN — Medication 5 ML: at 22:25

## 2020-02-16 RX ADMIN — LOSARTAN POTASSIUM 25 MG: 25 TABLET ORAL at 09:07

## 2020-02-16 RX ADMIN — Medication 5 ML: at 14:12

## 2020-02-16 RX ADMIN — OXYCODONE HYDROCHLORIDE 10 MG: 5 TABLET ORAL at 22:25

## 2020-02-16 RX ADMIN — Medication 5 TABLET: at 09:06

## 2020-02-16 RX ADMIN — CEFPODOXIME PROXETIL 200 MG: 200 TABLET, FILM COATED ORAL at 09:07

## 2020-02-16 RX ADMIN — LOSARTAN POTASSIUM 25 MG: 25 TABLET ORAL at 17:35

## 2020-02-16 RX ADMIN — ASPIRIN 325 MG ORAL TABLET 325 MG: 325 PILL ORAL at 09:07

## 2020-02-16 RX ADMIN — LEVOTHYROXINE SODIUM 50 MCG: 0.05 TABLET ORAL at 06:35

## 2020-02-16 RX ADMIN — Medication 1 TABLET: at 09:07

## 2020-02-16 RX ADMIN — ASPIRIN 81 MG 81 MG: 81 TABLET ORAL at 09:07

## 2020-02-16 RX ADMIN — LORATADINE 10 MG: 10 TABLET ORAL at 09:07

## 2020-02-16 RX ADMIN — PANTOPRAZOLE SODIUM 40 MG: 40 TABLET, DELAYED RELEASE ORAL at 17:35

## 2020-02-16 RX ADMIN — MONTELUKAST 10 MG: 10 TABLET, FILM COATED ORAL at 09:07

## 2020-02-16 RX ADMIN — Medication 5 ML: at 06:36

## 2020-02-16 RX ADMIN — Medication 1 TABLET: at 17:35

## 2020-02-16 RX ADMIN — PANTOPRAZOLE SODIUM 40 MG: 40 TABLET, DELAYED RELEASE ORAL at 06:35

## 2020-02-16 RX ADMIN — OXYCODONE HYDROCHLORIDE 10 MG: 5 TABLET ORAL at 09:12

## 2020-02-16 RX ADMIN — Medication 1 TABLET: at 12:11

## 2020-02-16 NOTE — PROGRESS NOTES
Problem: Mobility Impaired (Adult and Pediatric)  Goal: *Therapy Goal (Edit Goal, Insert Text)  Outcome: Progressing Towards Goal  Note:   LEFT LE WBAT   STG:  (1.)Ms. Ivan Villalta will move from supine to sit and sit to supine , scoot up and down, and roll side to side with CONTACT GUARD ASSIST within 4 treatment day(s). (2.)Ms. Ivan Villalta will transfer from bed to chair and chair to bed with CONTACT GUARD ASSIST using the least restrictive device within 4 treatment day(s). (3.)Ms. Ivan Villalta will ambulate with CONTACT GUARD ASSIST for 200 feet with the least restrictive device within 4 treatment day(s). LTG:  (1.)Ms. Ivan Villalta will move from supine to sit and sit to supine , scoot up and down, and roll side to side in bed with STAND BY ASSIST within 7 treatment day(s). (2.)Ms. Ivan Villalta will transfer from bed to chair and chair to bed with STAND BY ASSIST using the least restrictive device within 7 treatment day(s). (3.)Ms. Ivan Villalta will ambulate with STAND BY ASSIST for 500 feet with the least restrictive device within 7 treatment day(s). ________________________________________________________________________________________________       PHYSICAL THERAPY: Daily Note and PM 2/16/2020  INPATIENT: PT Visit Days : 2  [place] / Product Type: Medicare /       NAME/AGE/GENDER: Birdie Bartlett is a 82 y.o. female   PRIMARY DIAGNOSIS: Closed left hip fracture ([facility] [S72.002A]  MDS (myelodysplastic syndrome) ([facility] [D46.9] Closed left hip fracture ([facility] Closed left hip fracture (HCC)  Procedure(s) (LRB):  LEFT HIP PERCUTANEOUS PINNING CANNULATED SCREWS (Left)  2 Days Post-Op  ICD-10: Treatment Diagnosis:    · Generalized Muscle Weakness (M62.81)  · Other lack of cordination (R27.8)  · Difficulty in walking, Not elsewhere classified (R26.2)  · Other abnormalities of gait and mobility (R26.89)   Precaution/Allergies:  Bactrim [sulfamethoprim];  Levaquin [levofloxacin]; and Tramadol      ASSESSMENT:     The patient is seated in the recliner chair upon contact and agreeable to PT treatment. The patient performed all transfers throughout with min A and verbal cues for hand placement. The patient scooted forward in the chair with CGA and performed sit to stand. She ambulated 13' with RW and CGA at a slow pace with verbal cues for advancing the walker and sequencing the BLE. The patient sat on the [facility] and demonstrated intact sitting balance during voiding. She took an extended rest break following voiding due to fatigue, before agreeing to ambulate again. She stood from the [facility] and ambulated an additional 13' with RW and CGA with improved step length and posture. She sat on the edge of bed and performed sit to supine with min A for mobilizing the BLE. The patient was positioned for comfort with needs in reach. Overall the patient is making good progress toward therapy goals as indicated by increased gait distances and gait technique. Will continue skilled PT treatment as patient is still below functional baseline. ?????? ? ? This section established at most recent assessment??????????   PROBLEM LIST (Impairments causing functional limitations):  1. Decreased Strength affecting function   2. Decreased Transfer Abilities   3. Decreased Ambulation Ability/Technique   4. Decreased Balance   5. Decreased Activity Tolerance   6. Decreased Pacing Skills   7. Increased Fatigue affecting function   8. Decreased Flexibility/Joint Mobility   9.  Decreased [place] with Home Exercise Program   REHABILITATION POTENTIAL FOR STATED GOALS: GOOD  PLAN OF CARE:INTERVENTIONS PLANNED: (Benefits and precautions of physical therapy have been discussed with the patient.)  1. balance exercise   2. bed mobility   3. family education   4. gait training   5. range of motion: active/assisted/passive   6. therapeutic activities   7. therapeutic exercise/strengthening   8. transfer training FREQUENCY/DURATION: Follow patient 3 x's per week for until goals are met in order to address above goals. REHAB RECOMMENDATIONS (at time of discharge pending progress):    Placement: It is my opinion, based on this patient's performance to date, that Ms. Florence Canas may benefit from intensive therapy at a [address] [place] [address] after discharge due to the functional deficits listed above that are likely to improve with skilled rehabilitation and concerns that he/she may be unsafe to be unsupervised at home due to recent falls and debility. .  Equipment:    None at this time              HISTORY:   History of Present Injury/Illness (Reason for Referral): PER MD H&P   Charly Prado is a 82 y.o. female who fell on her left lower extremity from a standing height. She had just left a hospital clinic getting an injection. She denies any other problems really besides her left lower extremity. She says she has some soreness in her left shoulder but this is the only other thing that she has noticed. She is able to move her left shoulder around very well and she does seem to think that the pain is rather minimal.  Past Medical History/Comorbidities:   Ms. Florence Canas  has a past medical history of Aplastic anemia ([address] (2/10/2017), Asthma, Bronchogenic cyst (2/10/2017), CHF (congestive heart failure) ([address] (2/10/2017), Chronic cough (2/10/2017), Chronic cystitis (2/10/2017), Hyperlipidemia (2/10/2017), Hypertension (2/10/2017), Hypothyroidism (2/10/2017), IBS (irritable bowel syndrome) (2/9/2017), Mediastinal tumor (2/9/2017), Myelodysplasia (myelodysplastic syndrome) ([address] (2/10/2017), and Pneumonia.   Ms. Florence Canas  has a past surgical history that includes hx cholecystectomy; hx tonsillectomy; hx hysterectomy; hx other surgical (Left); hx hernia repair; and hx cyst removal.  Social History/Living Environment:   Home Environment: Private residence  # Steps to Enter: 4  One/Two Story Residence: [facility] story  Living Alone: No  Support Systems: Family member(s)  Patient Expects to be Discharged to[de-identified] Rehabilitation facility  Current DME Used/Available at Home: Namon Panning, rolling, Walker, rollator, Commode, bedside  Prior Level of Function/Work/Activity:  Had been limited with recent co-morbidities and treatments. Dominant Side:         RIGHT    Personal Factors:          Sex:  female        Age:  82 y.o. Number of Personal Factors/Comorbidities that affect the Plan of Care: 0: LOW COMPLEXITY   EXAMINATION:   Most Recent Physical Functioning:   Gross Assessment:                  Posture:     Balance:  Sitting: Intact  Standing: Impaired  Standing - Static: Fair  Standing - Dynamic : Fair Bed Mobility:  Rolling: Contact guard assistance  Supine to Sit: Minimum assistance  Sit to Supine: Minimum assistance  Scooting: Minimum assistance  Wheelchair Mobility:     Transfers:  Sit to Stand: Minimum assistance  Stand to Sit: Contact guard assistance  Gait:     Base of Support: Center of gravity altered;Narrowed  Speed/Fidelia: Fluctuations;Slow;Shuffled  Step Length: Right shortened;Left shortened  Stance: Left decreased  Gait Abnormalities: Decreased step clearance;Shuffling gait; Step to gait  Distance (ft): 15 Feet (ft)(x2)  Assistive Device: Walker, rolling  Ambulation - Level of Assistance: Minimal assistance; Additional time  Interventions: Safety awareness training;Verbal cues      Body Structures Involved:  1. Bones  2. Joints  3. Muscles  4. Ligaments Body Functions Affected:  1. Neuromusculoskeletal  2. Movement Related  3. Skin Related  4. Metobolic/Endocrine Activities and Participation Affected:  1. Communication  2. Mobility  3. Self Care  4.  Community, Social and [place]   Number of elements that affect the Plan of Care: 1-2: LOW COMPLEXITY   CLINICAL PRESENTATION:   Presentation: Stable and uncomplicated: LOW COMPLEXITY   CLINICAL DECISION MAKING:   Bernadette Ferreira Inpatient Short Form  How much difficulty does the patient currently have. .. Unable A Lot A Little None   1. Turning over in bed (including adjusting bedclothes, sheets and blankets)? [] 1   [] 2   [x] 3   [] 4   2. Sitting down on and standing up from a chair with arms ( e.g., wheelchair, bedside commode, etc.)   [] 1   [] 2   [x] 3   [] 4   3. Moving from lying on back to sitting on the side of the bed? [] 1   [] 2   [x] 3   [] 4   How much help from another person does the patient currently need. .. Total A Lot A Little None   4. Moving to and from a bed to a chair (including a wheelchair)? [] 1   [] 2   [x] 3   [] 4   5. Need to walk in hospital room? [] 1   [] 2   [x] 3   [] 4   6. Climbing 3-5 steps with a railing? [] 1   [] 2   [x] 3   [] 4   © 2007, Trustees of [address] [place] [address], under license to [employer]. All rights reserved      Score:  Initial: 18 Most Recent: X (Date: -- )    Interpretation of Tool:  Represents activities that are increasingly more difficult (i.e. Bed mobility, Transfers, Gait). Medical Necessity:     · Patient demonstrates   · good  ·  rehab potential due to higher previous functional level. Reason for Services/Other Comments:  · Patient continues to require skilled intervention due to   · S/p surgical left LE. · .   Use of outcome tool(s) and clinical judgement create a POC that gives a: Clear prediction of patient's progress: LOW COMPLEXITY            TREATMENT:   (In addition to Assessment/Re-Assessment sessions the following treatments were rendered)   Pre-treatment Symptoms/Complaints:  \"I'm tired\"  Pain: Initial:   Pain Intensity 1: 0  Post Session:  0/10, resting in bed     Therapeutic Activity: (    29 min): Therapeutic activities including Bed transfers, Chair transfers, Toilet transfers, ambulation on level surfaces, instruction in gait mechanics and safe transfer techniques and static standing balance to improve mobility, strength and balance. Required minimal Safety awareness training;Verbal cues to promote static and dynamic balance in standing. Therapeutic Exercise: (  ):  Exercises per grid below to improve mobility, strength, balance and coordination. Required minimal visual, verbal, manual and tactile cues to promote proper body alignment, promote proper body posture and promote proper body mechanics. Progressed repetitions as indicated. Seated EOB  Date:  2/15/2020  Date:   Date:     Activity/Exercise Parameters Parameters Parameters   AP's  10 x's      LAQ's  10 x's      HIP ABD  10 x's     SEATED MARCHES  10 x's                            Braces/Orthotics/Lines/Etc:   · none  Treatment/Session Assessment:    · Response to Treatment: See above   · Interdisciplinary Collaboration:   o Physical Therapy Assistant  o Registered Nurse  · After treatment position/precautions:   o Supine in bed  o Bed/Chair-wheels locked  o Bed in low position  o Call light within reach  o RN notified   · Compliance with Program/Exercises: Will assess as treatment progresses  · Recommendations/Intent for next treatment session: \"Next visit will focus on advancements to more challenging activities, reduction in assistance provided, and transfers, ambulation and mobility with therapeutic exercise left LE.   WBAT. \".   Total Treatment Duration:  PT Patient Time In/Time Out  Time In: 1330  Time Out: [address], PTA

## 2020-02-16 NOTE — PROGRESS NOTES
Problem: Pressure Injury - Risk of  Goal: *Prevention of pressure injury  Description  Document Chuck Scale and appropriate interventions in the flowsheet. Outcome: Progressing Towards Goal  Note: Pressure Injury Interventions:  Sensory Interventions: Assess changes in LOC, Avoid rigorous massage over bony prominences    Moisture Interventions: Absorbent underpads, Check for incontinence Q2 hours and as needed    Activity Interventions: Pressure redistribution bed/mattress(bed type), Increase time out of bed    Mobility Interventions: HOB 30 degrees or less, Pressure redistribution bed/mattress (bed type)    Nutrition Interventions: Offer support with meals,snacks and hydration, Document food/fluid/supplement intake    Friction and Shear Interventions: HOB 30 degrees or less                Problem: Patient Education: Go to Patient Education Activity  Goal: Patient/Family Education  Outcome: Progressing Towards Goal     Problem: Falls - Risk of  Goal: *Absence of Falls  Description  Document Amarjit Fall Risk and appropriate interventions in the flowsheet.   Outcome: Progressing Towards Goal  Note: Fall Risk Interventions:  Mobility Interventions: Communicate number of staff needed for ambulation/transfer, Patient to call before getting OOB, Bed/chair exit alarm         Medication Interventions: Bed/chair exit alarm, Patient to call before getting OOB, Teach patient to arise slowly    Elimination Interventions: Bed/chair exit alarm, Call light in reach, Patient to call for help with toileting needs, Toilet paper/wipes in reach    History of Falls Interventions: Bed/chair exit alarm, Investigate reason for fall         Problem: Patient Education: Go to Patient Education Activity  Goal: Patient/Family Education  Outcome: Progressing Towards Goal     Problem: Hip Fracture:Day of Admission Pre-op  Goal: Off Pathway (Use only if patient is Off Pathway)  Outcome: Progressing Towards Goal  Goal: Activity/Safety  Outcome: Progressing Towards Goal  Goal: Consults, if ordered  Outcome: Progressing Towards Goal  Goal: Diagnostic Test/Procedures  Outcome: Progressing Towards Goal  Goal: Nutrition/Diet  Outcome: Progressing Towards Goal  Goal: Medications  Outcome: Progressing Towards Goal  Goal: Respiratory  Outcome: Progressing Towards Goal  Goal: Treatments/Interventions/Procedures  Outcome: Progressing Towards Goal  Goal: Psychosocial  Outcome: Progressing Towards Goal  Goal: *Labs/Tests Within Defined Limits in Preparation for Surgery  Outcome: Progressing Towards Goal  Goal: *Optimal pain control at patient's stated goal  Outcome: Progressing Towards Goal  Goal: *Hemodynamically stable  Outcome: Progressing Towards Goal     Problem: Pain  Goal: *Control of Pain  Outcome: Progressing Towards Goal     Problem: Patient Education: Go to Patient Education Activity  Goal: Patient/Family Education  Outcome: Progressing Towards Goal     Problem: Patient Education: Go to Patient Education Activity  Goal: Patient/Family Education  Outcome: Progressing Towards Goal

## 2020-02-16 NOTE — PROGRESS NOTES
Problem: Mobility Impaired (Adult and Pediatric)  Goal: *Therapy Goal (Edit Goal, Insert Text)  Outcome: Progressing Towards Goal  Note:   LEFT LE WBAT   STG:  (1.)Ms. Ramya Raya will move from supine to sit and sit to supine , scoot up and down, and roll side to side with CONTACT GUARD ASSIST within 4 treatment day(s). (2.)Ms. Ramya Raya will transfer from bed to chair and chair to bed with CONTACT GUARD ASSIST using the least restrictive device within 4 treatment day(s). (3.)Ms. Ramya Raya will ambulate with CONTACT GUARD ASSIST for 200 feet with the least restrictive device within 4 treatment day(s). LTG:  (1.)Ms. Ramya Raya will move from supine to sit and sit to supine , scoot up and down, and roll side to side in bed with STAND BY ASSIST within 7 treatment day(s). (2.)Ms. Ramya Raya will transfer from bed to chair and chair to bed with STAND BY ASSIST using the least restrictive device within 7 treatment day(s). (3.)Ms. Ramya Raya will ambulate with STAND BY ASSIST for 500 feet with the least restrictive device within 7 treatment day(s). ________________________________________________________________________________________________       PHYSICAL THERAPY: Initial Assessment and PM 2/15/2020  INPATIENT: PT Visit Days : 1  [place] / [place] / Product Type: Medicare /       NAME/AGE/GENDER: Burgess Gorman is a 82 y.o. female   PRIMARY DIAGNOSIS: Closed left hip fracture ([facility] [S72.002A]  MDS (myelodysplastic syndrome) ([facility] [D46.9] Closed left hip fracture ([facility] Closed left hip fracture (HCC)  Procedure(s) (LRB):  LEFT HIP PERCUTANEOUS PINNING CANNULATED SCREWS (Left)  1 Day Post-Op  ICD-10: Treatment Diagnosis:    · Generalized Muscle Weakness (M62.81)  · Other lack of cordination (R27.8)  · Difficulty in walking, Not elsewhere classified (R26.2)  · Other abnormalities of gait and mobility (R26.89)   Precaution/Allergies:  Bactrim [sulfamethoprim];  Levaquin [levofloxacin]; and Tramadol      ASSESSMENT:   PM  Ms. Ivan Villalta is a pleasant frail elderly female with above diagnosis and undergoing chemotherapy treatments who demonstrates with decreased transfers, ambulation and mobility below her prior functional baseline. All transfers are currently limited at minimal assistance x 1 from seated to stand followed by ambulation and gait training with 2 wheel rolling walker for 15 feet with the deficits stated from bed in room to the chair. Skilled PT is indicated for this patient's functional mobility deficits. Patient requires cues to perform exercises correctly. Overall good progress towards physical therapy goals is anticipated. Will continue efforts as patient is still below functional baseline. ?????? ? ? This section established at most recent assessment??????????   PROBLEM LIST (Impairments causing functional limitations):  1. Decreased Strength affecting function   2. Decreased Transfer Abilities   3. Decreased Ambulation Ability/Technique   4. Decreased Balance   5. Decreased Activity Tolerance   6. Decreased Pacing Skills   7. Increased Fatigue affecting function   8. Decreased Flexibility/Joint Mobility   9. Decreased [place] with Home Exercise Program   REHABILITATION POTENTIAL FOR STATED GOALS: GOOD  PLAN OF CARE:INTERVENTIONS PLANNED: (Benefits and precautions of physical therapy have been discussed with the patient.)  1. balance exercise   2. bed mobility   3. family education   4. gait training   5. range of motion: active/assisted/passive   6. therapeutic activities   7. therapeutic exercise/strengthening   8. transfer training   FREQUENCY/DURATION: Follow patient 3 x's per week for until goals are met in order to address above goals. REHAB RECOMMENDATIONS (at time of discharge pending progress):    Placement: It is my opinion, based on this patient's performance to date, that Ms. Ivan Villalta may benefit from intensive therapy at a [address] [place] [address] after discharge due to the functional deficits listed above that are likely to improve with skilled rehabilitation and concerns that he/she may be unsafe to be unsupervised at home due to recent falls and debility. .  Equipment:    None at this time              HISTORY:   History of Present Injury/Illness (Reason for Referral): PER MD H&P   Savannah Arzate is a 82 y.o. female who fell on her left lower extremity from a standing height. She had just left a hospital clinic getting an injection. She denies any other problems really besides her left lower extremity. She says she has some soreness in her left shoulder but this is the only other thing that she has noticed. She is able to move her left shoulder around very well and she does seem to think that the pain is rather minimal.  Past Medical History/Comorbidities:   Ms. Zaira Hearn  has a past medical history of Aplastic anemia ([facility] (2/10/2017), Asthma, Bronchogenic cyst (2/10/2017), CHF (congestive heart failure) ([facility] (2/10/2017), Chronic cough (2/10/2017), Chronic cystitis (2/10/2017), Hyperlipidemia (2/10/2017), Hypertension (2/10/2017), Hypothyroidism (2/10/2017), IBS (irritable bowel syndrome) (2/9/2017), Mediastinal tumor (2/9/2017), Myelodysplasia (myelodysplastic syndrome) ([facility] (2/10/2017), and Pneumonia. Ms. Zaira Hearn  has a past surgical history that includes hx cholecystectomy; hx tonsillectomy; hx hysterectomy; hx other surgical (Left); hx hernia repair; and hx cyst removal.  Social History/Living Environment:   Home Environment: Private residence  # Steps to Enter: 4  One/Two Story Residence: One story  Living Alone: No  Support Systems: Family member(s)  Patient Expects to be Discharged to[de-identified] Rehabilitation facility  Current DME Used/Available at Home: Jonathan Lopez, flower, Walker, rollator, Commode, bedside  Prior Level of Function/Work/Activity:  Had been limited with recent co-morbidities and treatments.     Dominant Side: RIGHT    Personal Factors:          Sex:  female        Age:  82 y.o. Number of Personal Factors/Comorbidities that affect the Plan of Care: 0: LOW COMPLEXITY   EXAMINATION:   Most Recent Physical Functioning:   Gross Assessment:  AROM: Generally decreased, functional  Strength: Generally decreased, functional  Coordination: Generally decreased, functional  Tone: Normal  Sensation: Intact               Posture:     Balance:  Sitting: Intact  Standing: Impaired  Standing - Static: Fair  Standing - Dynamic : Fair Bed Mobility:  Rolling: Minimum assistance  Supine to Sit: Minimum assistance  Sit to Supine: Minimum assistance  Scooting: Minimum assistance  Wheelchair Mobility:     Transfers:  Sit to Stand: Minimum assistance  Stand to Sit: Minimum assistance  Bed to Chair: Minimum assistance  Gait:  Left Side Weight Bearing: As tolerated  Base of Support: Center of gravity altered  Speed/Fidelia: Delayed; Fluctuations; Pace decreased (<100 feet/min); Shuffled; Slow  Step Length: Left shortened;Right shortened  Swing Pattern: Left asymmetrical;Right asymmetrical  Stance: Left decreased;Time;Weight shift  Gait Abnormalities: Decreased step clearance  Distance (ft): 15 Feet (ft)  Assistive Device: Walker, rolling  Ambulation - Level of Assistance: Minimal assistance  Interventions: Manual cues; Safety awareness training; Tactile cues; Verbal cues; Visual/Demos      Body Structures Involved:  1. Bones  2. Joints  3. Muscles  4. Ligaments Body Functions Affected:  1. Neuromusculoskeletal  2. Movement Related  3. Skin Related  4. Metobolic/Endocrine Activities and Participation Affected:  1. Communication  2. Mobility  3. Self Care  4.  Community, Social and [place] Brady   Number of elements that affect the Plan of Care: 1-2: LOW COMPLEXITY   CLINICAL PRESENTATION:   Presentation: Stable and uncomplicated: LOW COMPLEXITY   CLINICAL DECISION MAKIN [facility] Inpatient Short Form  How much difficulty does the patient currently have. .. Unable A Lot A Little None   1. Turning over in bed (including adjusting bedclothes, sheets and blankets)? [] 1   [] 2   [x] 3   [] 4   2. Sitting down on and standing up from a chair with arms ( e.g., wheelchair, bedside commode, etc.)   [] 1   [] 2   [x] 3   [] 4   3. Moving from lying on back to sitting on the side of the bed? [] 1   [] 2   [x] 3   [] 4   How much help from another person does the patient currently need. .. Total A Lot A Little None   4. Moving to and from a bed to a chair (including a wheelchair)? [] 1   [] 2   [x] 3   [] 4   5. Need to walk in hospital room? [] 1   [] 2   [x] 3   [] 4   6. Climbing 3-5 steps with a railing? [] 1   [] 2   [x] 3   [] 4   © 2007, Trustees of [address] [place] [address], under license to [employer]. All rights reserved      Score:  Initial: 18 Most Recent: X (Date: -- )    Interpretation of Tool:  Represents activities that are increasingly more difficult (i.e. Bed mobility, Transfers, Gait). Medical Necessity:     · Patient demonstrates   · good  ·  rehab potential due to higher previous functional level. Reason for Services/Other Comments:  · Patient continues to require skilled intervention due to   · S/p surgical left LE. · .   Use of outcome tool(s) and clinical judgement create a POC that gives a: Clear prediction of patient's progress: LOW COMPLEXITY            TREATMENT:   (In addition to Assessment/Re-Assessment sessions the following treatments were rendered)   Pre-treatment Symptoms/Complaints:  left hip post surgical hip. Pain: Initial:   Pain Intensity 1: 0(no specific pain at this time.  )  Pain Location 1: Hip  Pain Orientation 1: Left  Pain Intervention(s) 1: Repositioned  Post Session:  0/10 no specific pain at this time. Gait Training ( 10 mins):  Gait training to improve and/or restore physical functioning as related to mobility, strength, balance, and coordination. Ambulated 15 Feet (ft) with Minimal assistance using a Walker, rolling and moderate Manual cues; Safety awareness training; Tactile cues; Verbal cues; Visual/Demos related to their stance phase and stride length to promote proper body alignment and promote proper body posture. Therapeutic Exercise: (  15 mins):  Exercises per grid below to improve mobility, strength, balance and coordination. Required minimal visual, verbal, manual and tactile cues to promote proper body alignment, promote proper body posture and promote proper body mechanics. Progressed repetitions as indicated. Seated EOB  Date:  2/15/2020  Date:   Date:     Activity/Exercise Parameters Parameters Parameters   AP's  10 x's      LAQ's  10 x's      HIP ABD  10 x's     SEATED MARCHES  10 x's                            Braces/Orthotics/Lines/Etc:   · O2 Device: Nasal cannula  Treatment/Session Assessment:    · Response to Treatment:  improved mobility and transfers. · Interdisciplinary Collaboration:   o Physical Therapist  o Registered Nurse  · After treatment position/precautions:   o Up in chair  o Bed alarm/tab alert on  o Bed/Chair-wheels locked  o Bed in low position  o Call light within reach  o Side rails x 3   · Compliance with Program/Exercises: Will assess as treatment progresses  · Recommendations/Intent for next treatment session: \"Next visit will focus on advancements to more challenging activities, reduction in assistance provided, and transfers, ambulation and mobility with therapeutic exercise left LE.   WBAT. \".   Total Treatment Duration:  PT Patient Time In/Time Out  Time In: 1530  Time Out: [address], [place]

## 2020-02-16 NOTE — PROGRESS NOTES
Hospitalist Progress Note     Admit Date:  2020  6:06 PM   Name:  Glenn Loera   Age:  82 y.o.  :  1935   MRN:  [id number]   PCP:  Kevin Lowery MD  Treatment Team: Attending Provider: Antony James MD; Consulting Provider: Inder Herrera MD; Utilization Review: Sanjeev Aldridge; Nurse Practitioner: Huang Gonsalez NP; Physical Therapy Assistant: Adeel Dupree PTA    Subjective:   HPI and or CC:  82year old CF PMH myelodysplastic syndrome on Aranesp admitted  for left hip fx. After falling at an infusion center. Underwent left hip ORIF on , also seen by cardiology until 2/15.    :  Patient sitting up in chair, family at bedside. Left hip dressing dry/intact. Afebrile, no leukocytosis. Plans for SNF, CM assisting with this. Objective:     Patient Vitals for the past 24 hrs:   Temp Pulse Resp BP SpO2   20 1159 98.2 °F (36.8 °C) 77 18 116/53 91 %   20 0718 98.6 °F (37 °C) 74 17 114/64 95 %   20 0555 97.4 °F (36.3 °C) 79 18 138/69 92 %   02/15/20 2351 98 °F (36.7 °C) 78 18 113/66 97 %   02/15/20 2006 97.8 °F (36.6 °C) 68 18 169/67 98 %   02/15/20 1523 97.5 °F (36.4 °C) 67 18 120/69 94 %     Oxygen Therapy  O2 Sat (%): 91 % (20 1159)  Pulse via Oximetry: 68 beats per minute (20 1733)  O2 Device: Nasal cannula (20)  O2 Flow Rate (L/min): 1 l/min (20 1733)  No intake or output data in the 24 hours ending 20 1423      REVIEW OF SYSTEMS: Comprehensive ROS performed and negative except as stated in HPI. Physical Examination:  General:    Well nourished. No gross distress. Head:  Normocephalic, atraumatic, nares patent  Eyes:  Extraocular movements intact, normal sclera  CV:   RRR. No  Murmurs, clicks, or gallops, distal pulses intact  Lungs:   Unlabored, no cyanosis, no wheeze  Abdomen:   Soft, nondistended, nontender. Extremities: Warm and dry. No cyanosis or edema. Left hip dressing dry/intact.   Skin: No rashes or jaundice. Neuro:  No gross focal deficits, no tremor  Psych:  Mood and affect appropriate    Data Review:  I have reviewed all labs, meds, telemetry events, and studies from the last 24 hours. Recent Results (from the past 24 hour(s))   PLEASE READ & DOCUMENT PPD TEST IN 48 HRS    Collection Time: 02/15/20  6:36 PM   Result Value Ref Range    PPD Negative Negative    mm Induration 0 0 - 5 mm   CBC WITH AUTOMATED DIFF    Collection Time: 02/16/20  5:37 AM   Result Value Ref Range    WBC 4.5 4.3 - 11.1 K/uL    RBC 2.76 (L) 4.05 - 5.2 M/uL    HGB 8.8 (L) 11.7 - 15.4 g/dL    HCT 27.9 (L) 35.8 - 46.3 %    .1 (H) 79.6 - 97.8 FL    MCH 31.9 26.1 - 32.9 PG    MCHC 31.5 31.4 - 35.0 g/dL    RDW 21.8 (H) 11.9 - 14.6 %    PLATELET 449 750 - 061 K/uL    MPV 12.8 (H) 9.4 - 12.3 FL    ABSOLUTE NRBC 0.02 0.0 - 0.2 K/uL    DF AUTOMATED      NEUTROPHILS 33 (L) 43 - 78 %    LYMPHOCYTES 50 (H) 13 - 44 %    MONOCYTES 13 (H) 4.0 - 12.0 %    EOSINOPHILS 2 0.5 - 7.8 %    BASOPHILS 0 0.0 - 2.0 %    IMMATURE GRANULOCYTES 1 0.0 - 5.0 %    ABS. NEUTROPHILS 1.5 (L) 1.7 - 8.2 K/UL    ABS. LYMPHOCYTES 2.3 0.5 - 4.6 K/UL    ABS. MONOCYTES 0.6 0.1 - 1.3 K/UL    ABS. EOSINOPHILS 0.1 0.0 - 0.8 K/UL    ABS. BASOPHILS 0.0 0.0 - 0.2 K/UL    ABS. IMM.  GRANS. 0.0 0.0 - 0.5 K/UL        All Micro Results     Procedure Component Value Units Date/Time    CULTURE, URINE [[id number]] Collected:  02/14/20 1020    Order Status:  Completed Specimen:  [place] Specimen Updated:  02/16/20 1563     Special Requests: NO SPECIAL REQUESTS        Culture result:       >100,000  COLONIES/mL  NORMAL SKIN JANICE ISOLATED      CULTURE, URINE [[id number]]     Order Status:  Canceled Specimen:  Clean catch           Current Meds:  Current Facility-Administered Medications   Medication Dose Route Frequency    influenza vaccine 2019-20 (6 mos+)(PF) (FLUARIX/FLULAVAL/FLUZONE QUAD) injection 0.5 mL  0.5 mL IntraMUSCular PRIOR TO DISCHARGE    cefpodoxime (VANTIN) tablet 200 mg  200 mg Oral DAILY    pantoprazole (PROTONIX) tablet 40 mg  40 mg Oral ACB&D    cloNIDine HCL (CATAPRES) tablet 0.2 mg  0.2 mg Oral BID PRN    hydrALAZINE (APRESOLINE) 20 mg/mL injection 10 mg  10 mg IntraVENous Q6H PRN    0.9% sodium chloride infusion 250 mL  250 mL IntraVENous PRN    0.9% sodium chloride infusion 250 mL  250 mL IntraVENous PRN    sodium chloride (NS) flush 5-40 mL  5-40 mL IntraVENous Q8H    sodium chloride (NS) flush 5-40 mL  5-40 mL IntraVENous PRN    ondansetron (ZOFRAN) injection 4 mg  4 mg IntraVENous Q4H PRN    alum-mag hydroxide-simeth (MYLANTA) oral suspension 30 mL  30 mL Oral Q4H PRN    calcium-vitamin D (OS-ZOILA) 500 mg-200 unit tablet  1 Tab Oral TID WITH MEALS    morphine injection 5 mg  5 mg IntraVENous Q4H PRN    oxyCODONE IR (ROXICODONE) tablet 10 mg  10 mg Oral Q3H PRN    aspirin tablet 325 mg  325 mg Oral DAILY    metoprolol (LOPRESSOR) injection 5 mg  5 mg IntraVENous Q6H PRN    levothyroxine (SYNTHROID) tablet 50 mcg  50 mcg Oral 6am    losartan (COZAAR) tablet 25 mg  25 mg Oral BID    montelukast (SINGULAIR) tablet 10 mg  10 mg Oral DAILY    sodium chloride (NS) flush 5-40 mL  5-40 mL IntraVENous Q8H    sodium chloride (NS) flush 5-40 mL  5-40 mL IntraVENous PRN    acetaminophen (TYLENOL) tablet 650 mg  650 mg Oral Q4H PRN    naloxone (NARCAN) injection 0.4 mg  0.4 mg IntraVENous PRN    diphenhydrAMINE (BENADRYL) injection 12.5 mg  12.5 mg IntraVENous Q4H PRN    cholecalciferol (VITAMIN D3) (400 Units /10 mcg) tablet 5 Tab  2,000 Units Oral DAILY    aspirin chewable tablet 81 mg  81 mg Oral DAILY    nitroglycerin (NITROSTAT) tablet 0.4 mg  0.4 mg SubLINGual PRN    loratadine (CLARITIN) tablet 10 mg  10 mg Oral DAILY       Diet:  DIET REGULAR    Other Studies (last 24 hours):  No results found.     Assessment and Plan:     Hospital Problems as of 2/16/2020 Date Reviewed: 2/14/2020          Codes Class Noted - Resolved POA    MDS (myelodysplastic syndrome) ([facility] ICD-10-CM: D46.9  ICD-9-CM: 238.75  2/13/2020 - Present Unknown        * (Principal) Closed left hip fracture ([facility] ICD-10-CM: Z07.143V  ICD-9-CM: 820.8  2/13/2020 - Present Unknown        CKD (chronic kidney disease) stage 3, GFR 30-59 ml/min ([facility]) ICD-10-CM: N18.3  ICD-9-CM: 585.3  2/13/2020 - Present Unknown        Pulmonary HTN ([facility]) ICD-10-CM: I27.20  ICD-9-CM: 416.8  2/13/2020 - Present Unknown        Chronic diastolic congestive heart failure (HCC) ICD-10-CM: I50.32  ICD-9-CM: 428.32, 428.0  2/13/2020 - Present Unknown        Dizziness ICD-10-CM: R42  ICD-9-CM: 780.4  2/13/2020 - Present Unknown        Hypothyroidism ICD-10-CM: E03.9  ICD-9-CM: 244.9  2/10/2017 - Present Yes        Hyperlipidemia ICD-10-CM: E78.5  ICD-9-CM: 272.4  2/10/2017 - Present Yes        Hypertension ICD-10-CM: I10  ICD-9-CM: 401.9  2/10/2017 - Present Yes        Chronic cough ICD-10-CM: R05  ICD-9-CM: 786.2  2/10/2017 - Present Yes              A/P:    Left hip fracture  -ORIF 2/14  -PT OT postop  - mg every day x 10 days     Congestive diastolic heart failure  -Cardiology consult, signed off 2/15. --Repeat echo with normal systolic function, ejection fraction 60 to 65%, no wall motion abnormalities, systolic function was also normal, with mild pulmonary artery hypertension     Pulmonary hypertension  -Per above     Acute UTI  -P.o.  Vantin     Hypertension  -Continue home losartan  -PRN Catapres and hydralazine     CKD stage III  -Monitor creatinine     Anemia  -Monitor H&H closely operatively      Signed:  KELLEE Mixon

## 2020-02-16 NOTE — PROGRESS NOTES
ORTHO PROGRESS NOTE    2020    Admit Date: 2020  Admit Diagnosis: Closed left hip fracture ([facility] [S72.002A]  MDS (myelodysplastic syndrome) ([facility] [D46.9]  Post Op day: 2 Days Post-Op      Subjective:     Nataly Baltazar is a patient who is now 2 Days Post-Op  and has no complaints. Objective:     PT/OT:    Progressing    Vital Signs:    Patient Vitals for the past 8 hrs:   BP Temp Pulse Resp SpO2 Weight   20 0650      64.6 kg (142 lb 6.4 oz)   20 0555 138/69 97.4 °F (36.3 °C) 79 18 92 %    02/15/20 2351 113/66 98 °F (36.7 °C) 78 18 97 %      Temp (24hrs), Av.8 °F (36.6 °C), Min:97.4 °F (36.3 °C), Max:98.2 °F (36.8 °C)      LAB:    Recent Labs     20  0537   HGB 8.8*   WBC 4.5          I/O:  No intake/output data recorded.  1901 -  0700  In: -   Out: 0676 [Urine:1425]    Physical Exam:    Awake and in no acute distress. Mood and affect appropriate. Respirations unlabored and no evidence cyanosis. Calves nontender. Abdomen soft and nontender. Dressing clean/dry  No new neurologic deficit.     Assessment:      Patient Active Problem List   Diagnosis Code    Mediastinal tumor D49.89    IBS (irritable bowel syndrome) K58.9    Bronchogenic cyst J98.4    Aplastic anemia (HCC) D61.9    CHF (congestive heart failure) ([facility]) I50.9    Myelodysplasia (myelodysplastic syndrome) (HCC) D46.9    Hypothyroidism E03.9    CKD (chronic kidney disease) N18.9    Hyperlipidemia E78.5    Hypertension I10    Chronic cystitis N30.20    Chronic cough R05    MDS (myelodysplastic syndrome) ([facility]) D46.9    Closed left hip fracture ([facility]) S72.002A    CKD (chronic kidney disease) stage 3, GFR 30-59 ml/min ([facility]) N18.3    Pulmonary HTN ([facility]) I27.20    Chronic diastolic congestive heart failure (HCC) I50.32    Dizziness R42       2 Days Post-Op STATUS POST Procedure(s):  LEFT HIP PERCUTANEOUS PINNING CANNULATED SCREWS      Plan:     Continue PT/OT/Rehab  Anticipate discharge to: SNF      Signed By: Sandoval Sutherland MD

## 2020-02-16 NOTE — PROGRESS NOTES
Reviewed notes for spiritual concerns prior to visit  Visit with patient to build rapport with .   Calm  Encouraged with presence and words of hope      Eliecer Baugh,  Staff   C: [phone]  /  [email or web address]

## 2020-02-16 NOTE — PROGRESS NOTES
Problem: Mobility Impaired (Adult and Pediatric)  Goal: *Therapy Goal (Edit Goal, Insert Text)  Outcome: Progressing Towards Goal  Note:   LEFT LE WBAT   STG:  (1.)Ms. Daniel Schuster will move from supine to sit and sit to supine , scoot up and down, and roll side to side with CONTACT GUARD ASSIST within 4 treatment day(s). (2.)Ms. Daniel Schuster will transfer from bed to chair and chair to bed with CONTACT GUARD ASSIST using the least restrictive device within 4 treatment day(s). (3.)Ms. Daniel Schuster will ambulate with CONTACT GUARD ASSIST for 200 feet with the least restrictive device within 4 treatment day(s). LTG:  (1.)Ms. Daniel Schuster will move from supine to sit and sit to supine , scoot up and down, and roll side to side in bed with STAND BY ASSIST within 7 treatment day(s). (2.)Ms. Daniel Schuster will transfer from bed to chair and chair to bed with STAND BY ASSIST using the least restrictive device within 7 treatment day(s). (3.)Ms. Daniel Schuster will ambulate with STAND BY ASSIST for 500 feet with the least restrictive device within 7 treatment day(s). ________________________________________________________________________________________________       PHYSICAL THERAPY: Daily Note and AM 2/16/2020  INPATIENT: PT Visit Days : 2  [place] / [place] / Product Type: Medicare /       NAME/AGE/GENDER: Marita Kay is a 82 y.o. female   PRIMARY DIAGNOSIS: Closed left hip fracture ([facility] [S72.002A]  MDS (myelodysplastic syndrome) ([facility] [D46.9] Closed left hip fracture ([facility] Closed left hip fracture (HCC)  Procedure(s) (LRB):  LEFT HIP PERCUTANEOUS PINNING CANNULATED SCREWS (Left)  2 Days Post-Op  ICD-10: Treatment Diagnosis:    · Generalized Muscle Weakness (M62.81)  · Other lack of cordination (R27.8)  · Difficulty in walking, Not elsewhere classified (R26.2)  · Other abnormalities of gait and mobility (R26.89)   Precaution/Allergies:  Bactrim [sulfamethoprim];  Levaquin [levofloxacin]; and Tramadol      ASSESSMENT:     The patient is supine in bed upon contact and agreeable to PT treatment. She performed bed mobility with min A to CGA with verbal cues for technique. She scooted to the edge of the bed with min A and verbal cues for hand placement. The patient performed sit to stand with min A, additional time and cues for hand placement and upright posture. She ambulated 2' to the [facility] and performed sit to stand with CGA. She demonstrated intact sitting balance on the [facility] while performing ADL activities. After a seated rest break on the [facility] due to dizziness (O2 98%) she performed sit to stand with min A and cues for foot placement and technique. The patient gait trained for 10' with the RW, additional time, min A during advancement of the RLE (weight bearing on left), and verbal cues for walker management, posture, and technique. She then requested to sit due to fatigue and rested in the straight back chair. She stood again with mod A (due to no handles on chair) and ambulated an additional 10' to the recliner chair with the same. She sat in the recliner with CGA and verbal cues for control of descent and hand placement. The patient scooted back in the chair with CGA and was positioned for comfort with needs in reach. Overall the patient is making slow progress toward therapy goals as indicated by increased activity tolerance. Will continue skilled PT treatment as patient is still below functional baseline. ?????? ? ? This section established at most recent assessment??????????   PROBLEM LIST (Impairments causing functional limitations):  1. Decreased Strength affecting function   2. Decreased Transfer Abilities   3. Decreased Ambulation Ability/Technique   4. Decreased Balance   5. Decreased Activity Tolerance   6. Decreased Pacing Skills   7. Increased Fatigue affecting function   8. Decreased Flexibility/Joint Mobility   9.  Decreased [place] with Home Exercise Program REHABILITATION POTENTIAL FOR STATED GOALS: GOOD  PLAN OF CARE:INTERVENTIONS PLANNED: (Benefits and precautions of physical therapy have been discussed with the patient.)  1. balance exercise   2. bed mobility   3. family education   4. gait training   5. range of motion: active/assisted/passive   6. therapeutic activities   7. therapeutic exercise/strengthening   8. transfer training   FREQUENCY/DURATION: Follow patient 3 x's per week for until goals are met in order to address above goals. REHAB RECOMMENDATIONS (at time of discharge pending progress):    Placement: It is my opinion, based on this patient's performance to date, that Ms. Salima Danielle may benefit from intensive therapy at a [address] [place] [address] after discharge due to the functional deficits listed above that are likely to improve with skilled rehabilitation and concerns that he/she may be unsafe to be unsupervised at home due to recent falls and debility. .  Equipment:    None at this time              HISTORY:   History of Present Injury/Illness (Reason for Referral): PER MD H&P   J Carlosmaksim Evaristo is a 82 y.o. female who fell on her left lower extremity from a standing height. She had just left a hospital clinic getting an injection. She denies any other problems really besides her left lower extremity. She says she has some soreness in her left shoulder but this is the only other thing that she has noticed.   She is able to move her left shoulder around very well and she does seem to think that the pain is rather minimal.  Past Medical History/Comorbidities:   Ms. Salima Danielle  has a past medical history of Aplastic anemia ([address] (2/10/2017), Asthma, Bronchogenic cyst (2/10/2017), CHF (congestive heart failure) ([address] (2/10/2017), Chronic cough (2/10/2017), Chronic cystitis (2/10/2017), Hyperlipidemia (2/10/2017), Hypertension (2/10/2017), Hypothyroidism (2/10/2017), IBS (irritable bowel syndrome) (2/9/2017), Mediastinal tumor (2/9/2017), Myelodysplasia (myelodysplastic syndrome) ([facility] (2/10/2017), and Pneumonia. Ms. Dee Hernandez  has a past surgical history that includes hx cholecystectomy; hx tonsillectomy; hx hysterectomy; hx other surgical (Left); hx hernia repair; and hx cyst removal.  Social History/Living Environment:   Home Environment: Private residence  # Steps to Enter: 4  One/Two Story Residence: One story  Living Alone: No  Support Systems: Family member(s)  Patient Expects to be Discharged to[de-identified] Rehabilitation facility  Current DME Used/Available at Home: Elisha Pronto, rolling, Walker, rollator, Commode, bedside  Prior Level of Function/Work/Activity:  Had been limited with recent co-morbidities and treatments. Dominant Side:         RIGHT    Personal Factors:          Sex:  female        Age:  82 y.o. Number of Personal Factors/Comorbidities that affect the Plan of Care: 0: LOW COMPLEXITY   EXAMINATION:   Most Recent Physical Functioning:   Gross Assessment:                  Posture:     Balance:  Sitting: Intact  Standing: Impaired  Standing - Static: Fair  Standing - Dynamic : Fair Bed Mobility:  Rolling: Contact guard assistance  Supine to Sit: Minimum assistance  Scooting: Minimum assistance  Wheelchair Mobility:     Transfers:  Sit to Stand: Minimum assistance  Stand to Sit: Contact guard assistance  Gait:     Base of Support: Center of gravity altered;Narrowed  Speed/Fidelia: Fluctuations;Slow;Shuffled  Step Length: Right shortened;Left shortened  Stance: Left decreased  Gait Abnormalities: Decreased step clearance;Shuffling gait; Step to gait  Distance (ft): 20 Feet (ft)(one seated rest break)  Assistive Device: Walker, rolling  Ambulation - Level of Assistance: Minimal assistance; Additional time  Interventions: Safety awareness training;Verbal cues      Body Structures Involved:  1. Bones  2. Joints  3. Muscles  4. Ligaments Body Functions Affected:  1. Neuromusculoskeletal  2. Movement Related  3.  Skin Related  4. Metobolic/Endocrine Activities and Participation Affected:  1. Communication  2. Mobility  3. Self Care  4. Community, Social and [place]   Number of elements that affect the Plan of Care: 1-2: LOW COMPLEXITY   CLINICAL PRESENTATION:   Presentation: Stable and uncomplicated: LOW COMPLEXITY   CLINICAL DECISION MAKIN [facility] Mobility Inpatient Short Form  How much difficulty does the patient currently have. .. Unable A Lot A Little None   1. Turning over in bed (including adjusting bedclothes, sheets and blankets)? [] 1   [] 2   [x] 3   [] 4   2. Sitting down on and standing up from a chair with arms ( e.g., wheelchair, bedside commode, etc.)   [] 1   [] 2   [x] 3   [] 4   3. Moving from lying on back to sitting on the side of the bed? [] 1   [] 2   [x] 3   [] 4   How much help from another person does the patient currently need. .. Total A Lot A Little None   4. Moving to and from a bed to a chair (including a wheelchair)? [] 1   [] 2   [x] 3   [] 4   5. Need to walk in hospital room? [] 1   [] 2   [x] 3   [] 4   6. Climbing 3-5 steps with a railing? [] 1   [] 2   [x] 3   [] 4   © , Trustees of [address] [place] [address], under license to [employer]. All rights reserved      Score:  Initial: 18 Most Recent: X (Date: -- )    Interpretation of Tool:  Represents activities that are increasingly more difficult (i.e. Bed mobility, Transfers, Gait). Medical Necessity:     · Patient demonstrates   · good  ·  rehab potential due to higher previous functional level. Reason for Services/Other Comments:  · Patient continues to require skilled intervention due to   · S/p surgical left LE.        · .   Use of outcome tool(s) and clinical judgement create a POC that gives a: Clear prediction of patient's progress: LOW COMPLEXITY            TREATMENT:   (In addition to Assessment/Re-Assessment sessions the following treatments were rendered)   Pre-treatment Symptoms/Complaints:  \"I'm dizzy\"     Pain: Initial:   Pain Intensity 1: 6  Post Session:  6/10, the same, nursing aware     Gait Training ( 18 mins):  Gait training to improve and/or restore physical functioning as related to mobility, strength, balance, and coordination. Ambulated 20 Feet (ft)(one seated rest break) with Minimal assistance; Additional time using a Walker, rolling and moderate Safety awareness training;Verbal cues related to their stance phase and stride length to promote proper body alignment and promote proper body posture. Therapeutic Activity: (    10 min): Therapeutic activities including Bed transfers, Chair transfers, Toilet transfers and static standing balance to improve mobility, strength and balance. Required minimal Safety awareness training;Verbal cues to promote static and dynamic balance in standing. Therapeutic Exercise: (  ):  Exercises per grid below to improve mobility, strength, balance and coordination. Required minimal visual, verbal, manual and tactile cues to promote proper body alignment, promote proper body posture and promote proper body mechanics. Progressed repetitions as indicated. Seated EOB  Date:  2/15/2020  Date:   Date:     Activity/Exercise Parameters Parameters Parameters   AP's  10 x's      LAQ's  10 x's      HIP ABD  10 x's     SEATED MARCHES  10 x's                            Braces/Orthotics/Lines/Etc:   · O2 Device: Nasal cannula  Treatment/Session Assessment:    · Response to Treatment: See above   · Interdisciplinary Collaboration:   o Physical Therapy Assistant  o Registered Nurse  · After treatment position/precautions:   o Up in chair  o Bed alarm/tab alert on  o Bed/Chair-wheels locked  o Bed in low position  o Call light within reach  o RN notified   · Compliance with Program/Exercises: Will assess as treatment progresses  · Recommendations/Intent for next treatment session:   \"Next visit will focus on advancements to more challenging activities, reduction in assistance provided, and transfers, ambulation and mobility with therapeutic exercise left LE.   WBAT. \".   Total Treatment Duration:  PT Patient Time In/Time Out  Time In: 0828  Time Out: 100 Kristy Astorga, PTA

## 2020-02-17 LAB
ANION GAP SERPL CALC-SCNC: 5 MMOL/L (ref 7–16)
BASOPHILS # BLD: 0 K/UL (ref 0–0.2)
BASOPHILS NFR BLD: 0 % (ref 0–2)
BUN SERPL-MCNC: 25 MG/DL (ref 8–23)
CALCIUM SERPL-MCNC: 9.1 MG/DL (ref 8.3–10.4)
CHLORIDE SERPL-SCNC: 108 MMOL/L (ref 98–107)
CO2 SERPL-SCNC: 26 MMOL/L (ref 21–32)
CREAT SERPL-MCNC: 1.21 MG/DL (ref 0.6–1)
DIFFERENTIAL METHOD BLD: ABNORMAL
EOSINOPHIL # BLD: 0.1 K/UL (ref 0–0.8)
EOSINOPHIL NFR BLD: 2 % (ref 0.5–7.8)
ERYTHROCYTE [DISTWIDTH] IN BLOOD BY AUTOMATED COUNT: 21.2 % (ref 11.9–14.6)
GLUCOSE SERPL-MCNC: 92 MG/DL (ref 65–100)
HCT VFR BLD AUTO: 26.3 % (ref 35.8–46.3)
HGB BLD-MCNC: 8.1 G/DL (ref 11.7–15.4)
IMM GRANULOCYTES # BLD AUTO: 0 K/UL (ref 0–0.5)
IMM GRANULOCYTES NFR BLD AUTO: 1 % (ref 0–5)
LYMPHOCYTES # BLD: 2.7 K/UL (ref 0.5–4.6)
LYMPHOCYTES NFR BLD: 53 % (ref 13–44)
MCH RBC QN AUTO: 31.3 PG (ref 26.1–32.9)
MCHC RBC AUTO-ENTMCNC: 30.8 G/DL (ref 31.4–35)
MCV RBC AUTO: 101.5 FL (ref 79.6–97.8)
MONOCYTES # BLD: 0.6 K/UL (ref 0.1–1.3)
MONOCYTES NFR BLD: 12 % (ref 4–12)
NEUTS SEG # BLD: 1.7 K/UL (ref 1.7–8.2)
NEUTS SEG NFR BLD: 33 % (ref 43–78)
NRBC # BLD: 0 K/UL (ref 0–0.2)
PLATELET # BLD AUTO: 162 K/UL (ref 150–450)
PMV BLD AUTO: 13.2 FL (ref 9.4–12.3)
POTASSIUM SERPL-SCNC: 3.9 MMOL/L (ref 3.5–5.1)
RBC # BLD AUTO: 2.59 M/UL (ref 4.05–5.2)
SODIUM SERPL-SCNC: 139 MMOL/L (ref 136–145)
WBC # BLD AUTO: 5.2 K/UL (ref 4.3–11.1)

## 2020-02-17 PROCEDURE — 97116 GAIT TRAINING THERAPY: CPT

## 2020-02-17 PROCEDURE — 94760 N-INVAS EAR/PLS OXIMETRY 1: CPT

## 2020-02-17 PROCEDURE — 80048 BASIC METABOLIC PNL TOTAL CA: CPT

## 2020-02-17 PROCEDURE — 74011250637 HC RX REV CODE- 250/637: Performed by: ORTHOPAEDIC SURGERY

## 2020-02-17 PROCEDURE — 65270000029 HC RM PRIVATE

## 2020-02-17 PROCEDURE — 97535 SELF CARE MNGMENT TRAINING: CPT

## 2020-02-17 PROCEDURE — 85025 COMPLETE CBC W/AUTO DIFF WBC: CPT

## 2020-02-17 PROCEDURE — 36415 COLL VENOUS BLD VENIPUNCTURE: CPT

## 2020-02-17 PROCEDURE — 97530 THERAPEUTIC ACTIVITIES: CPT

## 2020-02-17 RX ORDER — CEFPODOXIME PROXETIL 200 MG/1
200 TABLET, FILM COATED ORAL EVERY 12 HOURS
Status: DISCONTINUED | OUTPATIENT
Start: 2020-02-17 | End: 2020-02-18 | Stop reason: HOSPADM

## 2020-02-17 RX ADMIN — Medication 5 ML: at 14:39

## 2020-02-17 RX ADMIN — CEFPODOXIME PROXETIL 200 MG: 200 TABLET, FILM COATED ORAL at 20:56

## 2020-02-17 RX ADMIN — LEVOTHYROXINE SODIUM 50 MCG: 0.05 TABLET ORAL at 06:17

## 2020-02-17 RX ADMIN — ASPIRIN 81 MG 81 MG: 81 TABLET ORAL at 08:34

## 2020-02-17 RX ADMIN — LOSARTAN POTASSIUM 25 MG: 25 TABLET ORAL at 08:29

## 2020-02-17 RX ADMIN — CEFPODOXIME PROXETIL 200 MG: 200 TABLET, FILM COATED ORAL at 08:29

## 2020-02-17 RX ADMIN — LOSARTAN POTASSIUM 25 MG: 25 TABLET ORAL at 18:54

## 2020-02-17 RX ADMIN — ASPIRIN 325 MG ORAL TABLET 325 MG: 325 PILL ORAL at 08:29

## 2020-02-17 RX ADMIN — PANTOPRAZOLE SODIUM 40 MG: 40 TABLET, DELAYED RELEASE ORAL at 18:54

## 2020-02-17 RX ADMIN — Medication 1 TABLET: at 18:54

## 2020-02-17 RX ADMIN — Medication 10 ML: at 20:58

## 2020-02-17 RX ADMIN — Medication 5 ML: at 06:17

## 2020-02-17 RX ADMIN — MONTELUKAST 10 MG: 10 TABLET, FILM COATED ORAL at 08:30

## 2020-02-17 RX ADMIN — ACETAMINOPHEN 650 MG: 325 TABLET, FILM COATED ORAL at 14:37

## 2020-02-17 RX ADMIN — Medication 1 TABLET: at 12:35

## 2020-02-17 RX ADMIN — OXYCODONE HYDROCHLORIDE 10 MG: 5 TABLET ORAL at 20:56

## 2020-02-17 RX ADMIN — LORATADINE 10 MG: 10 TABLET ORAL at 08:30

## 2020-02-17 RX ADMIN — Medication 5 TABLET: at 08:29

## 2020-02-17 RX ADMIN — PANTOPRAZOLE SODIUM 40 MG: 40 TABLET, DELAYED RELEASE ORAL at 06:16

## 2020-02-17 RX ADMIN — Medication 10 ML: at 20:56

## 2020-02-17 RX ADMIN — Medication 5 ML: at 14:38

## 2020-02-17 RX ADMIN — Medication 1 TABLET: at 08:30

## 2020-02-17 NOTE — PROGRESS NOTES
Problem: Self Care Deficits Care Plan (Adult)  Goal: *Acute Goals and Plan of Care (Insert Text)  Description  1. Patient will maintain WBAT status in LLE for 100% of treatment session and no verbal cues from therapist.   2. Patient will complete functional transfers with supervision while maintaining WBAT status in LLE and with adaptive equipment as needed. 3. Patient will complete lower body bathing and dressing with minimal assistance and adaptive equipment as needed. PARTIALLY MET  4. Patient will complete toileting and toilet transfer with supervision. 5. Patient will tolerate 20 minutes of OT treatment with as needed rest breaks to increase activity tolerance for ADLs. 6. Patient will participate in at least 20 minutes of BUE therapeutic exercises to strengthen BUE for functional transfers. Timeframe: 7 visits      Outcome: Progressing Towards Goal       OCCUPATIONAL THERAPY: Daily Note 2/17/2020  INPATIENT: OT Visit Days: 2  [place] / Product Type: Medicare /      NAME/AGE/GENDER: Gracia Cosby is a 82 y.o. female   PRIMARY DIAGNOSIS:  Closed left hip fracture ([facility] [address] [S72.002A]  MDS (myelodysplastic syndrome) ([facility] [address] [D46.9] Closed left hip fracture ([facility] [address] Closed left hip fracture (HCC)  Procedure(s) (LRB):  LEFT HIP PERCUTANEOUS PINNING CANNULATED SCREWS (Left)  3 Days Post-Op  ICD-10: Treatment Diagnosis:    · Pain in left hip (M25.552)  · Stiffness of Left Hip, Not elsewhere classified (M25.652)  · History of falling (Z91.81)   Precautions/Allergies:    WBAT LLE Bactrim [sulfamethoprim]; Levaquin [levofloxacin]; and Tramadol      ASSESSMENT:     Ms. Bernie Beltran is an 82year old female admitted after fall at [facility] while getting an infusion. Now sp above procedure and WBAT in LLE. At baseline she lives with her daughters and is typically independent with ADLs and driving. She uses either a rollator or RW.  She enjoys doing chair exercise class 2x/ week. 2/17/2020: Patient seated in chair upon arrival, agreeable to OT treatment. Reports pain 4/10 in hip. Alert and oriented. Patient participated in ADL activity including grooming, bathing, and dressing from seated and standing position at sink. See below grid for specifics. She did well and is making excellent progress. Pleasant and motivated. Continue OT efforts. This section established at most recent assessment   PROBLEM LIST (Impairments causing functional limitations):  1. Decreased Strength  2. Decreased ADL/Functional Activities  3. Decreased Transfer Abilities  4. Decreased Ambulation Ability/Technique  5. Decreased Balance  6. Increased Pain  7. Decreased Activity Tolerance  8. Decreased Flexibility/Joint Mobility  9. Decreased Knowledge of Precautions   INTERVENTIONS PLANNED: (Benefits and precautions of occupational therapy have been discussed with the patient.)  1. Activities of daily living training  2. Adaptive equipment training  3. Therapeutic activity  4. Therapeutic exercise     TREATMENT PLAN: Frequency/Duration: Follow patient 6x/ week to address above goals. Rehabilitation Potential For Stated Goals: Good     REHAB RECOMMENDATIONS (at time of discharge pending progress):    Placement: It is my opinion, based on this patient's performance to date, that Ms. Solange Reed may benefit from intensive therapy at [address] after discharge due to the functional deficits listed above that are likely to improve with skilled rehabilitation.   Equipment:    None at this time              OCCUPATIONAL PROFILE AND HISTORY:   History of Present Injury/Illness (Reason for Referral):  S/p above procedure   Past Medical History/Comorbidities:   Ms. Solange Reed  has a past medical history of Aplastic anemia ([facility] [address] (2/10/2017), Asthma, Bronchogenic cyst (2/10/2017), CHF (congestive heart failure) ([facility] [address] (2/10/2017), Chronic cough (2/10/2017), Chronic cystitis (2/10/2017), Hyperlipidemia (2/10/2017), Hypertension (2/10/2017), Hypothyroidism (2/10/2017), IBS (irritable bowel syndrome) (2/9/2017), Mediastinal tumor (2/9/2017), Myelodysplasia (myelodysplastic syndrome) ([facility] [address] (2/10/2017), and Pneumonia. Ms. Paige Gomes  has a past surgical history that includes hx cholecystectomy; hx tonsillectomy; hx hysterectomy; hx other surgical (Left); hx hernia repair; and hx cyst removal.  Social History/Living Environment:   Home Environment: Private residence  # Steps to Enter: 4  One/Two Story Residence: One story  Living Alone: No  Support Systems: Family member(s)  Patient Expects to be Discharged to[de-identified] Rehabilitation facility  Current DME Used/Available at Home: Surendra Bene, rolling, Walker, rollator, Commode, bedside  Prior Level of Function/Work/Activity:  At baseline she lives with her daughters and is typically independent with ADLs and driving. She uses either a rollator or RW. She enjoys doing chair exercise class 2x/ week. Number of Personal Factors/Comorbidities that affect the Plan of Care: Brief history (0):  LOW COMPLEXITY   ASSESSMENT OF OCCUPATIONAL PERFORMANCE[de-identified]   Activities of Daily Living:   Basic ADLs (From Assessment) Complex ADLs (From Assessment)   Feeding: Setup  Oral Facial Hygiene/Grooming: Setup  Bathing: Moderate assistance  Upper Body Dressing: Minimum assistance  Lower Body Dressing: Maximum assistance  Toileting: Moderate assistance Instrumental ADL  Meal Preparation: Maximum assistance  Homemaking: Maximum assistance   Grooming/Bathing/Dressing Activities of Daily Living   Grooming  Grooming Assistance: Supervision(washing hair with shampoo pack)  Position Performed: Seated in chair  Washing Face: Supervision  Washing Hands: Supervision  Brushing Teeth: Supervision  Brushing/Combing Hair: Supervision  Cues: Verbal cues provided Cognitive Retraining  Safety/Judgement: Awareness of environment; Fall prevention   Upper Body Bathing  Bathing Assistance: Set-up  Position Performed: Seated in chair Feeding  Feeding Assistance: Independent  Food to Mouth: Independent   Lower Body Bathing  Bathing Assistance: Contact guard assistance  Perineal  : Contact guard assistance  Position Performed: Standing  Lower Body : Supervision  Position Performed: Seated in chair     Upper Body Dressing Assistance  Dressing Assistance: [facility] with hospital gown: Set-up       Bed/Mat Mobility  Rolling: Contact guard assistance  Supine to Sit: Minimum assistance  Sit to Stand: Contact guard assistance;Minimum assistance  Stand to Sit: Contact guard assistance  Scooting: Contact guard assistance     Most Recent Physical Functioning:   Gross Assessment:  AROM: Within functional limits  Strength: Within functional limits               Posture:     Balance:  Sitting: Intact  Standing: Impaired  Standing - Static: Fair  Standing - Dynamic : Fair Bed Mobility:  Rolling: Contact guard assistance  Supine to Sit: Minimum assistance  Scooting: Contact guard assistance  Wheelchair Mobility:     Transfers:  Sit to Stand: Contact guard assistance;Minimum assistance  Stand to Sit: Contact guard assistance            Patient Vitals for the past 6 hrs:   BP BP Patient Position SpO2 Pulse   02/17/20 0941   93 %    02/17/20 1126 115/64 Sitting 95 % 64       Mental Status  Neurologic State: Alert  Orientation Level: Oriented X4  Cognition: Follows commands  Perception: Appears intact  Perseveration: No perseveration noted  Safety/Judgement: Awareness of environment, Fall prevention                          Physical Skills Involved:  1. Range of Motion  2. Balance  3. Strength  4. Activity Tolerance  5. Pain (acute) Cognitive Skills Affected (resulting in the inability to perform in a timely and safe manner):  1. NONE   Psychosocial Skills Affected:  1. Habits/Routines  2. Environmental Adaptation  3. Self-Awareness  4.  Social Roles   Number of elements that affect the Plan of Care: 5+:  HIGH COMPLEXITY   CLINICAL DECISION MAKING:   [facility] AM-PAC 6 Clicks   Daily Activity Inpatient Short Form  How much help from another person does the patient currently need. .. Total A Lot A Little None   1. Putting on and taking off regular lower body clothing? [] 1   [x] 2   [] 3   [] 4   2. Bathing (including washing, rinsing, drying)? [] 1   [x] 2   [] 3   [] 4   3. Toileting, which includes using toilet, bedpan or urinal?   [] 1   [x] 2   [] 3   [] 4   4. Putting on and taking off regular upper body clothing? [] 1   [] 2   [x] 3   [] 4   5. Taking care of personal grooming such as brushing teeth? [] 1   [] 2   [x] 3   [] 4   6. Eating meals? [] 1   [] 2   [x] 3   [] 4   © 2007, Trustees of [facility], under license to [employer]. All rights reserved      Score:  Initial: 15 Most Recent: X (Date: -- )    Interpretation of Tool:  Represents activities that are increasingly more difficult (i.e. Bed mobility, Transfers, Gait). Medical Necessity:     · Patient demonstrates   · good  ·  rehab potential due to higher previous functional level. Reason for Services/Other Comments:  · Patient   · continues to require present interventions due to patient's inability to care for self at baseline level of function   · . Use of outcome tool(s) and clinical judgement create a POC that gives a: MODERATE COMPLEXITY         TREATMENT:   (In addition to Assessment/Re-Assessment sessions the following treatments were rendered)     Pre-treatment Symptoms/Complaints:    Pain: Initial:   Pain Intensity 1: 4  Pain Intervention(s) 1: Ambulation/Increased Activity  Post Session:  same     Self Care: (43 minutes): Procedure(s) (per grid) utilized to improve and/or restore self-care/home management as related to total body dressing, bathing and grooming. Required minimal verbal cueing to facilitate activities of daily living skills and compensatory activities.  See above ADL grid.    Braces/Orthotics/Lines/Etc:   · IV  · O2 Device: Nasal cannula  Treatment/Session Assessment:    · Response to Treatment:  tolerated well   · Interdisciplinary Collaboration:   o Occupational Therapist  o Registered Nurse  · After treatment position/precautions:   o Up in chair  o Bed alarm/tab alert on  o Bed/Chair-wheels locked  o Call light within reach  o RN notified  o Family at bedside   · Compliance with Program/Exercises: Compliant all of the time. · Recommendations/Intent for next treatment session: \"Next visit will focus on advancements to more challenging activities and reduction in assistance provided\".   Total Treatment Duration:  OT Patient Time In/Time Out  Time In: 1145  Time Out: 9785 MARY Delacruz/MARCELLE

## 2020-02-17 NOTE — PROGRESS NOTES
Problem: Mobility Impaired (Adult and Pediatric)  Goal: *Therapy Goal (Edit Goal, Insert Text)  Outcome: Progressing Towards Goal  Note:   LEFT LE WBAT   STG:  (1.)Ms. Selene Araujo will move from supine to sit and sit to supine , scoot up and down, and roll side to side with CONTACT GUARD ASSIST within 4 treatment day(s). (2.)Ms. Selene Araujo will transfer from bed to chair and chair to bed with CONTACT GUARD ASSIST using the least restrictive device within 4 treatment day(s). (3.)Ms. Selene Araujo will ambulate with CONTACT GUARD ASSIST for 200 feet with the least restrictive device within 4 treatment day(s). LTG:  (1.)Ms. Selene Araujo will move from supine to sit and sit to supine , scoot up and down, and roll side to side in bed with STAND BY ASSIST within 7 treatment day(s). (2.)Ms. Selene Araujo will transfer from bed to chair and chair to bed with STAND BY ASSIST using the least restrictive device within 7 treatment day(s). (3.)Ms. Selene Araujo will ambulate with STAND BY ASSIST for 500 feet with the least restrictive device within 7 treatment day(s). ________________________________________________________________________________________________       PHYSICAL THERAPY: Daily Note and PM 2/17/2020  INPATIENT: PT Visit Days : 3  [place] / Product Type: Medicare /       NAME/AGE/GENDER: Ronan Macario is a 82 y.o. female   PRIMARY DIAGNOSIS: Closed left hip fracture ([facility] [address] [S72.002A]  MDS (myelodysplastic syndrome) ([facility] [address] [D46.9] Closed left hip fracture ([facility] [address] Closed left hip fracture (HCC)  Procedure(s) (LRB):  LEFT HIP PERCUTANEOUS PINNING CANNULATED SCREWS (Left)  3 Days Post-Op  ICD-10: Treatment Diagnosis:    · Generalized Muscle Weakness (M62.81)  · Other lack of cordination (R27.8)  · Difficulty in walking, Not elsewhere classified (R26.2)  · Other abnormalities of gait and mobility (R26.89)   Precaution/Allergies:  Bactrim [sulfamethoprim];  Levaquin [levofloxacin]; and Tramadol      ASSESSMENT:     The patient is seated in the recliner upon contact and agreeable to PT treatment and back to bed. The patient scooted forward in the chair with CGA and performed transfers throughout with CGA. She stood from the chair and ambulated 15' with the RW and CGA, verbal cues for posture and walker management. She sat on the [facility] and demonstrated intact sitting balance while voiding and self care. The patient then stood and ambulated an additional 27' with RW CGA. She demonstrated a slow steady pace with good gait mechanics and walker management needing cues only as she began to fatigue. The patient then returned to the room and sat on the edge of the bed. She performed sit to supine with min A for the LLE and scooted up in the bed with min A and verbal cues for technique and placement of BLE. The patient was then positioned for comfort with needs in reach. Overall the patient is making good progress toward therapy goals as indicated by increased gait distances and activity tolerance. Will continue skilled PT treatment as patient is still below functional baseline. ?????? ? ? This section established at most recent assessment??????????   PROBLEM LIST (Impairments causing functional limitations):  1. Decreased Strength affecting function   2. Decreased Transfer Abilities   3. Decreased Ambulation Ability/Technique   4. Decreased Balance   5. Decreased Activity Tolerance   6. Decreased Pacing Skills   7. Increased Fatigue affecting function   8. Decreased Flexibility/Joint Mobility   9.  Decreased [place] with Home Exercise Program   REHABILITATION POTENTIAL FOR STATED GOALS: GOOD  PLAN OF CARE:INTERVENTIONS PLANNED: (Benefits and precautions of physical therapy have been discussed with the patient.)  1. balance exercise   2. bed mobility   3. family education   4. gait training   5. range of motion: active/assisted/passive   6. therapeutic activities   7. therapeutic exercise/strengthening   8. transfer training   FREQUENCY/DURATION: Follow patient 3 x's per week for until goals are met in order to address above goals. REHAB RECOMMENDATIONS (at time of discharge pending progress):    Placement: It is my opinion, based on this patient's performance to date, that Ms. Paige Gomes may benefit from intensive therapy at a [address] [place] [address] after discharge due to the functional deficits listed above that are likely to improve with skilled rehabilitation and concerns that he/she may be unsafe to be unsupervised at home due to recent falls and debility. .  Equipment:    None at this time              HISTORY:   History of Present Injury/Illness (Reason for Referral): PER MD H&P   Frankie Mendez is a 82 y.o. female who fell on her left lower extremity from a standing height. She had just left a hospital clinic getting an injection. She denies any other problems really besides her left lower extremity. She says she has some soreness in her left shoulder but this is the only other thing that she has noticed. She is able to move her left shoulder around very well and she does seem to think that the pain is rather minimal.  Past Medical History/Comorbidities:   Ms. Paige Gomes  has a past medical history of Aplastic anemia ([address] (2/10/2017), Asthma, Bronchogenic cyst (2/10/2017), CHF (congestive heart failure) ([address] (2/10/2017), Chronic cough (2/10/2017), Chronic cystitis (2/10/2017), Hyperlipidemia (2/10/2017), Hypertension (2/10/2017), Hypothyroidism (2/10/2017), IBS (irritable bowel syndrome) (2/9/2017), Mediastinal tumor (2/9/2017), Myelodysplasia (myelodysplastic syndrome) ([address] (2/10/2017), and Pneumonia.   Ms. Paige Gomes  has a past surgical history that includes hx cholecystectomy; hx tonsillectomy; hx hysterectomy; hx other surgical (Left); hx hernia repair; and hx cyst removal.  Social History/Living Environment:   Home Environment: Private residence  # Steps to Enter: 4  One/Two Story Residence: One story  Living Alone: No  Support Systems: Family member(s)  Patient Expects to be Discharged to[de-identified] Rehabilitation facility  Current DME Used/Available at Home: Namon Panning, rolling, Walker, rollator, Commode, bedside  Prior Level of Function/Work/Activity:  Had been limited with recent co-morbidities and treatments. Dominant Side:         RIGHT    Personal Factors:          Sex:  female        Age:  82 y.o. Number of Personal Factors/Comorbidities that affect the Plan of Care: 0: LOW COMPLEXITY   EXAMINATION:   Most Recent Physical Functioning:   Gross Assessment:                  Posture:     Balance:  Sitting: Intact  Standing: Impaired  Standing - Static: Good  Standing - Dynamic : Fair Bed Mobility:  Rolling: Contact guard assistance  Supine to Sit: Minimum assistance  Sit to Supine: Minimum assistance  Scooting: Contact guard assistance  Wheelchair Mobility:     Transfers:  Sit to Stand: Contact guard assistance  Stand to Sit: Contact guard assistance  Gait:  Left Side Weight Bearing: As tolerated  Base of Support: Center of gravity altered;Narrowed  Speed/Fidelia: Slow  Step Length: Right shortened;Left shortened  Stance: Left decreased  Gait Abnormalities: Decreased step clearance;Trunk sway increased; Antalgic  Distance (ft): 30 Feet (ft)(and 15)  Assistive Device: Walker, rolling  Ambulation - Level of Assistance: Contact guard assistance;Stand-by assistance  Interventions: Verbal cues; Safety awareness training      Body Structures Involved:  1. Bones  2. Joints  3. Muscles  4. Ligaments Body Functions Affected:  1. Neuromusculoskeletal  2. Movement Related  3. Skin Related  4. Metobolic/Endocrine Activities and Participation Affected:  1. Communication  2. Mobility  3. Self Care  4.  Community, Social and [place]   Number of elements that affect the Plan of Care: 1-2: LOW COMPLEXITY   CLINICAL PRESENTATION:   Presentation: Stable and uncomplicated: LOW COMPLEXITY CLINICAL DECISION MAKIN[address] [place] [address] AM-PAC 6 Clicks   Basic Mobility Inpatient Short Form  How much difficulty does the patient currently have. .. Unable A Lot A Little None   1. Turning over in bed (including adjusting bedclothes, sheets and blankets)? [] 1   [] 2   [x] 3   [] 4   2. Sitting down on and standing up from a chair with arms ( e.g., wheelchair, bedside commode, etc.)   [] 1   [] 2   [x] 3   [] 4   3. Moving from lying on back to sitting on the side of the bed? [] 1   [] 2   [x] 3   [] 4   How much help from another person does the patient currently need. .. Total A Lot A Little None   4. Moving to and from a bed to a chair (including a wheelchair)? [] 1   [] 2   [x] 3   [] 4   5. Need to walk in hospital room? [] 1   [] 2   [x] 3   [] 4   6. Climbing 3-5 steps with a railing? [] 1   [] 2   [x] 3   [] 4   © 2007, Trustees of [address] [place], [place] [id number], under license to [employer]. All rights reserved      Score:  Initial: 18 Most Recent: X (Date: -- )    Interpretation of Tool:  Represents activities that are increasingly more difficult (i.e. Bed mobility, Transfers, Gait). Medical Necessity:     · Patient demonstrates   · good  ·  rehab potential due to higher previous functional level. Reason for Services/Other Comments:  · Patient continues to require skilled intervention due to   · S/p surgical left LE. · .   Use of outcome tool(s) and clinical judgement create a POC that gives a: Clear prediction of patient's progress: LOW COMPLEXITY            TREATMENT:   (In addition to Assessment/Re-Assessment sessions the following treatments were rendered)   Pre-treatment Symptoms/Complaints:  \"I'm ready\"  Pain: Initial:   Pain Intensity 1: 3  Post Session:  0/10     Therapeutic Activity: (    26 min):   Therapeutic activities including Bed transfers, Chair transfers, Toilet transfers, ambulation on level surfaces, and static standing balance to improve mobility, strength and balance. Required minimal Verbal cues; Safety awareness training to promote static and dynamic balance in standing. Gait Training (   min):  Gait training to improve and/or restore physical functioning as related to mobility, strength, balance and coordination. Ambulated 30 Feet (ft)(and 15) with Contact guard assistance;Stand-by assistance using a Walker, rolling and moderate Verbal cues; Safety awareness training related to their stride length, heel strike and posture to promote proper body alignment, promote proper body posture and promote proper body mechanics. Instruction in performance of step length and walker management to correct stride length and heel strike. Therapeutic Exercise: (  ):  Exercises per grid below to improve mobility, strength, balance and coordination. Required minimal visual, verbal, manual and tactile cues to promote proper body alignment, promote proper body posture and promote proper body mechanics. Progressed repetitions as indicated. Seated EOB  Date:  2/15/2020  Date:   Date:     Activity/Exercise Parameters Parameters Parameters   AP's  10 x's      LAQ's  10 x's      HIP ABD  10 x's     SEATED MARCHES  10 x's                            Braces/Orthotics/Lines/Etc:   · none  Treatment/Session Assessment:    · Response to Treatment: See above   · Interdisciplinary Collaboration:   o Physical Therapy Assistant  o Registered Nurse  · After treatment position/precautions:   o Supine in bed  o Bed/Chair-wheels locked  o Bed in low position  o Call light within reach  o RN notified   · Compliance with Program/Exercises: Will assess as treatment progresses  · Recommendations/Intent for next treatment session: \"Next visit will focus on advancements to more challenging activities, reduction in assistance provided, and transfers, ambulation and mobility with therapeutic exercise left LE.   WBAT. \".   Total Treatment Duration:  PT Patient Time In/Time Out  Time In: 1409  Time Out: [address] [place], [place]

## 2020-02-17 NOTE — PROGRESS NOTES
[facility] liaison would like to delay Aranesp injection until after she discharges from their facility. CM will contact Dr. Michelle Melchor tomorrow.

## 2020-02-17 NOTE — PROGRESS NOTES
Problem: Pressure Injury - Risk of  Goal: *Prevention of pressure injury  Description  Document Chuck Scale and appropriate interventions in the flowsheet. Outcome: Progressing Towards Goal  Note: Pressure Injury Interventions:  Sensory Interventions: Assess changes in LOC    Moisture Interventions: Absorbent underpads    Activity Interventions: Increase time out of bed, Pressure redistribution bed/mattress(bed type)    Mobility Interventions: HOB 30 degrees or less, Pressure redistribution bed/mattress (bed type)    Nutrition Interventions: Offer support with meals,snacks and hydration, Document food/fluid/supplement intake    Friction and Shear Interventions: HOB 30 degrees or less                Problem: Patient Education: Go to Patient Education Activity  Goal: Patient/Family Education  Outcome: Progressing Towards Goal     Problem: Falls - Risk of  Goal: *Absence of Falls  Description  Document Amarjit Fall Risk and appropriate interventions in the flowsheet. Outcome: Progressing Towards Goal  Note: Fall Risk Interventions:  Mobility Interventions: Communicate number of staff needed for ambulation/transfer, Patient to call before getting OOB         Medication Interventions: Patient to call before getting OOB, Teach patient to arise slowly    Elimination Interventions: Call light in reach, Patient to call for help with toileting needs, Toilet paper/wipes in reach    History of Falls Interventions:  Investigate reason for fall         Problem: Patient Education: Go to Patient Education Activity  Goal: Patient/Family Education  Outcome: Progressing Towards Goal     Problem: Hip Fracture:Day of Admission Pre-op  Goal: Off Pathway (Use only if patient is Off Pathway)  Outcome: Progressing Towards Goal  Goal: Consults, if ordered  Outcome: Progressing Towards Goal  Goal: Diagnostic Test/Procedures  Outcome: Progressing Towards Goal  Goal: Nutrition/Diet  Outcome: Progressing Towards Goal  Goal: Medications  Outcome: Progressing Towards Goal  Goal: Respiratory  Outcome: Progressing Towards Goal  Goal: Treatments/Interventions/Procedures  Outcome: Progressing Towards Goal  Goal: Psychosocial  Outcome: Progressing Towards Goal  Goal: *Labs/Tests Within Defined Limits in Preparation for Surgery  Outcome: Progressing Towards Goal  Goal: *Optimal pain control at patient's stated goal  Outcome: Progressing Towards Goal  Goal: *Hemodynamically stable  Outcome: Progressing Towards Goal     Problem: Pain  Goal: *Control of Pain  Outcome: Progressing Towards Goal

## 2020-02-17 NOTE — PROGRESS NOTES
Hospitalist Progress Note     Admit Date:  2020  6:06 PM   Name:  Jozef Cisneros   Age:  82 y.o.  :  1935   MRN:  [id number]   PCP:  Veronica Ribeiro MD  Treatment Team: Attending Provider: Katherine Faustin MD; Consulting Provider: Virginia Vidales MD; Utilization Review: Erica Morataya; Nurse Practitioner: Eder Knox NP; Occupational Therapist: Ventura Abad OTR/MARCELLE; Physical Therapy Assistant: May Velez; Care Manager: Dave Thompson RN    Subjective:   HPI and or CC:  82year old CF PMH myelodysplastic syndrome on Aranesp admitted  for left hip fx. After falling at an infusion center. Underwent left hip ORIF on , also seen by cardiology until 2/15. :  Patient sitting up in chair, family at bedside. Left hip dressing dry/intact. Afebrile, no leukocytosis. Plans for SNF, CM assisting with this. Objective:     Patient Vitals for the past 24 hrs:   Temp Pulse Resp BP SpO2   20 1126 98 °F (36.7 °C) 64 18 115/64 95 %   20 0941     93 %   20 0740 98.7 °F (37.1 °C) 76 19 124/67 92 %   20 0321 98.9 °F (37.2 °C) 77 19 114/61 93 %   20 2312 98.7 °F (37.1 °C) 85 19 135/74 94 %   20 1924 98.5 °F (36.9 °C) 79 19 127/65 92 %   20 1554 98.2 °F (36.8 °C) 68 19 111/64 90 %     Oxygen Therapy  O2 Sat (%): 95 % (20 1126)  Pulse via Oximetry: 81 beats per minute (20 0941)  O2 Device: Room air (20)  O2 Flow Rate (L/min): 1 l/min (20 1733)  No intake or output data in the 24 hours ending 20 1222      REVIEW OF SYSTEMS: Comprehensive ROS performed and negative except as stated in HPI. Physical Examination:  General:    Well nourished. No gross distress. Head:  Normocephalic, atraumatic, nares patent  Eyes:  Extraocular movements intact, normal sclera  CV:   RRR.   No  Murmurs, clicks, or gallops, distal pulses intact  Lungs:   Unlabored, no cyanosis, no wheeze  Abdomen:   Soft, nondistended, nontender. Extremities: Warm and dry. No cyanosis or edema. Left hip dressing dry/intact. Skin:     No rashes or jaundice. Neuro:  No gross focal deficits, no tremor  Psych:  Mood and affect appropriate    Data Review:  I have reviewed all labs, meds, telemetry events, and studies from the last 24 hours. Recent Results (from the past 24 hour(s))   CBC WITH AUTOMATED DIFF    Collection Time: 02/17/20  5:54 AM   Result Value Ref Range    WBC 5.2 4.3 - 11.1 K/uL    RBC 2.59 (L) 4.05 - 5.2 M/uL    HGB 8.1 (L) 11.7 - 15.4 g/dL    HCT 26.3 (L) 35.8 - 46.3 %    .5 (H) 79.6 - 97.8 FL    MCH 31.3 26.1 - 32.9 PG    MCHC 30.8 (L) 31.4 - 35.0 g/dL    RDW 21.2 (H) 11.9 - 14.6 %    PLATELET 884 321 - 224 K/uL    MPV 13.2 (H) 9.4 - 12.3 FL    ABSOLUTE NRBC 0.00 0.0 - 0.2 K/uL    DF AUTOMATED      NEUTROPHILS 33 (L) 43 - 78 %    LYMPHOCYTES 53 (H) 13 - 44 %    MONOCYTES 12 4.0 - 12.0 %    EOSINOPHILS 2 0.5 - 7.8 %    BASOPHILS 0 0.0 - 2.0 %    IMMATURE GRANULOCYTES 1 0.0 - 5.0 %    ABS. NEUTROPHILS 1.7 1.7 - 8.2 K/UL    ABS. LYMPHOCYTES 2.7 0.5 - 4.6 K/UL    ABS. MONOCYTES 0.6 0.1 - 1.3 K/UL    ABS. EOSINOPHILS 0.1 0.0 - 0.8 K/UL    ABS. BASOPHILS 0.0 0.0 - 0.2 K/UL    ABS. IMM.  GRANS. 0.0 0.0 - 0.5 K/UL   METABOLIC PANEL, BASIC    Collection Time: 02/17/20  5:54 AM   Result Value Ref Range    Sodium 139 136 - 145 mmol/L    Potassium 3.9 3.5 - 5.1 mmol/L    Chloride 108 (H) 98 - 107 mmol/L    CO2 26 21 - 32 mmol/L    Anion gap 5 (L) 7 - 16 mmol/L    Glucose 92 65 - 100 mg/dL    BUN 25 (H) 8 - 23 MG/DL    Creatinine 1.21 (H) 0.6 - 1.0 MG/DL    GFR est AA 55 (L) >60 ml/min/1.73m2    GFR est non-AA 45 (L) >60 ml/min/1.73m2    Calcium 9.1 8.3 - 10.4 MG/DL        All Micro Results     Procedure Component Value Units Date/Time    CULTURE, URINE [[id number]] Collected:  02/14/20 1020    Order Status:  Completed Specimen:  [place] Specimen Updated:  02/16/20 0132     Special Requests: NO SPECIAL REQUESTS Culture result:       >100,000  COLONIES/mL  NORMAL SKIN JANICE ISOLATED      CULTURE, URINE [[id number]]     Order Status:  Canceled Specimen:  Clean catch           Current Meds:  Current Facility-Administered Medications   Medication Dose Route Frequency    influenza vaccine 2019-20 (6 mos+)(PF) (FLUARIX/FLULAVAL/FLUZONE QUAD) injection 0.5 mL  0.5 mL IntraMUSCular PRIOR TO DISCHARGE    cefpodoxime (VANTIN) tablet 200 mg  200 mg Oral DAILY    pantoprazole (PROTONIX) tablet 40 mg  40 mg Oral ACB&D    cloNIDine HCL (CATAPRES) tablet 0.2 mg  0.2 mg Oral BID PRN    hydrALAZINE (APRESOLINE) 20 mg/mL injection 10 mg  10 mg IntraVENous Q6H PRN    0.9% sodium chloride infusion 250 mL  250 mL IntraVENous PRN    0.9% sodium chloride infusion 250 mL  250 mL IntraVENous PRN    sodium chloride (NS) flush 5-40 mL  5-40 mL IntraVENous Q8H    sodium chloride (NS) flush 5-40 mL  5-40 mL IntraVENous PRN    ondansetron (ZOFRAN) injection 4 mg  4 mg IntraVENous Q4H PRN    alum-mag hydroxide-simeth (MYLANTA) oral suspension 30 mL  30 mL Oral Q4H PRN    calcium-vitamin D (OS-ZOILA) 500 mg-200 unit tablet  1 Tab Oral TID WITH MEALS    morphine injection 5 mg  5 mg IntraVENous Q4H PRN    oxyCODONE IR (ROXICODONE) tablet 10 mg  10 mg Oral Q3H PRN    aspirin tablet 325 mg  325 mg Oral DAILY    metoprolol (LOPRESSOR) injection 5 mg  5 mg IntraVENous Q6H PRN    levothyroxine (SYNTHROID) tablet 50 mcg  50 mcg Oral 6am    losartan (COZAAR) tablet 25 mg  25 mg Oral BID    montelukast (SINGULAIR) tablet 10 mg  10 mg Oral DAILY    sodium chloride (NS) flush 5-40 mL  5-40 mL IntraVENous Q8H    sodium chloride (NS) flush 5-40 mL  5-40 mL IntraVENous PRN    acetaminophen (TYLENOL) tablet 650 mg  650 mg Oral Q4H PRN    naloxone (NARCAN) injection 0.4 mg  0.4 mg IntraVENous PRN    diphenhydrAMINE (BENADRYL) injection 12.5 mg  12.5 mg IntraVENous Q4H PRN    cholecalciferol (VITAMIN D3) (400 Units /10 mcg) tablet 5 Tab  2,000 Units Oral DAILY    aspirin chewable tablet 81 mg  81 mg Oral DAILY    nitroglycerin (NITROSTAT) tablet 0.4 mg  0.4 mg SubLINGual PRN    loratadine (CLARITIN) tablet 10 mg  10 mg Oral DAILY       Diet:  DIET REGULAR    Other Studies (last 24 hours):  No results found. Assessment and Plan:     Hospital Problems as of 2/17/2020 Date Reviewed: 2/14/2020          Codes Class Noted - Resolved POA    MDS (myelodysplastic syndrome) ([facility] ICD-10-CM: D46.9  ICD-9-CM: 238.75  2/13/2020 - Present Unknown        * (Principal) Closed left hip fracture ([facility] ICD-10-CM: Z39.875S  ICD-9-CM: 820.8  2/13/2020 - Present Unknown        CKD (chronic kidney disease) stage 3, GFR 30-59 ml/min ([facility]) ICD-10-CM: N18.3  ICD-9-CM: 585.3  2/13/2020 - Present Unknown        Pulmonary HTN ([facility] ICD-10-CM: I27.20  ICD-9-CM: 416.8  2/13/2020 - Present Unknown        Chronic diastolic congestive heart failure (HCC) ICD-10-CM: I50.32  ICD-9-CM: 428.32, 428.0  2/13/2020 - Present Unknown        Dizziness ICD-10-CM: R42  ICD-9-CM: 780.4  2/13/2020 - Present Unknown        Hypothyroidism ICD-10-CM: E03.9  ICD-9-CM: 244.9  2/10/2017 - Present Yes        Hyperlipidemia ICD-10-CM: E78.5  ICD-9-CM: 272.4  2/10/2017 - Present Yes        Hypertension ICD-10-CM: I10  ICD-9-CM: 401.9  2/10/2017 - Present Yes        Chronic cough ICD-10-CM: R05  ICD-9-CM: 786.2  2/10/2017 - Present Yes              A/P:    Left hip fracture  -ORIF 2/14  -PT OT postop  - mg every day x 10 days     Congestive diastolic heart failure  -Cardiology consult, signed off 2/15. --Repeat echo with normal systolic function, ejection fraction 60 to 65%, no wall motion abnormalities, systolic function was also normal, with mild pulmonary artery hypertension     Pulmonary hypertension  -Per above     Acute UTI  -P.o.  Vantin     Hypertension  -Continue home losartan  -PRN Catapres and hydralazine     CKD stage III  -Monitor creatinine     Anemia  -Monitor H&H closely operatively      Signed:  KELLEE Spear

## 2020-02-17 NOTE — PROGRESS NOTES
Problem: Mobility Impaired (Adult and Pediatric)  Goal: *Therapy Goal (Edit Goal, Insert Text)  Outcome: Progressing Towards Goal  Note:   LEFT LE WBAT   STG:  (1.)Ms. Salima Danielel will move from supine to sit and sit to supine , scoot up and down, and roll side to side with CONTACT GUARD ASSIST within 4 treatment day(s). (2.)Ms. Salima Danielle will transfer from bed to chair and chair to bed with CONTACT GUARD ASSIST using the least restrictive device within 4 treatment day(s). (3.)Ms. Salima Danielle will ambulate with CONTACT GUARD ASSIST for 200 feet with the least restrictive device within 4 treatment day(s). LTG:  (1.)Ms. Salima Danielle will move from supine to sit and sit to supine , scoot up and down, and roll side to side in bed with STAND BY ASSIST within 7 treatment day(s). (2.)Ms. Salima Danielle will transfer from bed to chair and chair to bed with STAND BY ASSIST using the least restrictive device within 7 treatment day(s). (3.)Ms. Salima Danielle will ambulate with STAND BY ASSIST for 500 feet with the least restrictive device within 7 treatment day(s). ________________________________________________________________________________________________       PHYSICAL THERAPY: Daily Note and AM 2/17/2020  INPATIENT: PT Visit Days : 3  [place] / [place] / Product Type: Medicare /       NAME/AGE/GENDER: Glenn Loera is a 82 y.o. female   PRIMARY DIAGNOSIS: Closed left hip fracture ([facility] [S72.002A]  MDS (myelodysplastic syndrome) ([facility] [D46.9] Closed left hip fracture ([facility] Closed left hip fracture (HCC)  Procedure(s) (LRB):  LEFT HIP PERCUTANEOUS PINNING CANNULATED SCREWS (Left)  3 Days Post-Op  ICD-10: Treatment Diagnosis:    · Generalized Muscle Weakness (M62.81)  · Other lack of cordination (R27.8)  · Difficulty in walking, Not elsewhere classified (R26.2)  · Other abnormalities of gait and mobility (R26.89)   Precaution/Allergies:  Bactrim [sulfamethoprim];  Levaquin [levofloxacin]; and Tramadol      ASSESSMENT:     The patient is supine in bed upon contact and agreeable to PT treatment. The patient performed transfers CGA throughout and verbal cues for hand placement. She performed supine to sit with min A for mobilizing the LLE and verbal cues for sequencing. The patient scooted to the edge of the bed with CGA and performed sit to stand. She ambulated 10' to the [facility], sat, and demonstrated intact sitting balance during voiding. The patient stood and ambulated an additional 27' with the RW, CGA and additional time with verbal cues for gait technique. She demonstrates decreased step length and step clearance with an antalgic gait and required cues to increase step length and for walker management and upright posture. The patient sat in the recliner chair and scooted back with CGA and verbal cues. Overall the patient is making good progress toward therapy goals as indicated by increased gait distances. Will continue skilled PT treatment as patient is below functional baseline. ?????? ? ? This section established at most recent assessment??????????   PROBLEM LIST (Impairments causing functional limitations):  1. Decreased Strength affecting function   2. Decreased Transfer Abilities   3. Decreased Ambulation Ability/Technique   4. Decreased Balance   5. Decreased Activity Tolerance   6. Decreased Pacing Skills   7. Increased Fatigue affecting function   8. Decreased Flexibility/Joint Mobility   9.  Decreased [place] with Home Exercise Program   REHABILITATION POTENTIAL FOR STATED GOALS: GOOD  PLAN OF CARE:INTERVENTIONS PLANNED: (Benefits and precautions of physical therapy have been discussed with the patient.)  1. balance exercise   2. bed mobility   3. family education   4. gait training   5. range of motion: active/assisted/passive   6. therapeutic activities   7. therapeutic exercise/strengthening   8. transfer training   FREQUENCY/DURATION: Follow patient 3 x's per week for until goals are met in order to address above goals. REHAB RECOMMENDATIONS (at time of discharge pending progress):    Placement: It is my opinion, based on this patient's performance to date, that Ms. Malinda Lopez may benefit from intensive therapy at a [address] after discharge due to the functional deficits listed above that are likely to improve with skilled rehabilitation and concerns that he/she may be unsafe to be unsupervised at home due to recent falls and debility. .  Equipment:    None at this time              HISTORY:   History of Present Injury/Illness (Reason for Referral): PER MD H&P   Jamesntemerita Pearson is a 82 y.o. female who fell on her left lower extremity from a standing height. She had just left a hospital clinic getting an injection. She denies any other problems really besides her left lower extremity. She says she has some soreness in her left shoulder but this is the only other thing that she has noticed. She is able to move her left shoulder around very well and she does seem to think that the pain is rather minimal.  Past Medical History/Comorbidities:   Ms. Malinda Lopez  has a past medical history of Aplastic anemia ([address] (2/10/2017), Asthma, Bronchogenic cyst (2/10/2017), CHF (congestive heart failure) ([address] (2/10/2017), Chronic cough (2/10/2017), Chronic cystitis (2/10/2017), Hyperlipidemia (2/10/2017), Hypertension (2/10/2017), Hypothyroidism (2/10/2017), IBS (irritable bowel syndrome) (2/9/2017), Mediastinal tumor (2/9/2017), Myelodysplasia (myelodysplastic syndrome) ([address] (2/10/2017), and Pneumonia.   Ms. Malinda Lopez  has a past surgical history that includes hx cholecystectomy; hx tonsillectomy; hx hysterectomy; hx other surgical (Left); hx hernia repair; and hx cyst removal.  Social History/Living Environment:   Home Environment: Private residence  # Steps to Enter: 4  One/Two Story Residence: One story  Living Alone: No  Support Systems: Family member(s)  Patient Expects to be Discharged to[de-identified] Rehabilitation facility  Current DME Used/Available at Home: Tom Urrutia, flower, Walker, rollator, Commode, bedside  Prior Level of Function/Work/Activity:  Had been limited with recent co-morbidities and treatments. Dominant Side:         RIGHT    Personal Factors:          Sex:  female        Age:  82 y.o. Number of Personal Factors/Comorbidities that affect the Plan of Care: 0: LOW COMPLEXITY   EXAMINATION:   Most Recent Physical Functioning:   Gross Assessment:                  Posture:     Balance:  Sitting: Intact  Standing: Impaired  Standing - Static: Good  Standing - Dynamic : Fair Bed Mobility:  Rolling: Contact guard assistance  Supine to Sit: Minimum assistance  Scooting: Contact guard assistance  Wheelchair Mobility:     Transfers:  Sit to Stand: Contact guard assistance  Stand to Sit: Contact guard assistance  Gait:  Left Side Weight Bearing: As tolerated  Base of Support: Center of gravity altered;Narrowed  Speed/Fidelia: Slow;Shuffled  Step Length: Right shortened;Left shortened  Stance: Left decreased  Gait Abnormalities: Decreased step clearance;Trunk sway increased; Antalgic  Distance (ft): 30 Feet (ft)  Assistive Device: Walker, rolling  Ambulation - Level of Assistance: Contact guard assistance  Interventions: Safety awareness training;Verbal cues      Body Structures Involved:  1. Bones  2. Joints  3. Muscles  4. Ligaments Body Functions Affected:  1. Neuromusculoskeletal  2. Movement Related  3. Skin Related  4. Metobolic/Endocrine Activities and Participation Affected:  1. Communication  2. Mobility  3. Self Care  4.  Community, Social and [place]   Number of elements that affect the Plan of Care: 1-2: LOW COMPLEXITY   CLINICAL PRESENTATION:   Presentation: Stable and uncomplicated: LOW COMPLEXITY   CLINICAL DECISION MAKIN [facility] Inpatient Short Form  How much difficulty does the patient currently have. .. Unable A Lot A Little None   1. Turning over in bed (including adjusting bedclothes, sheets and blankets)? [] 1   [] 2   [x] 3   [] 4   2. Sitting down on and standing up from a chair with arms ( e.g., wheelchair, bedside commode, etc.)   [] 1   [] 2   [x] 3   [] 4   3. Moving from lying on back to sitting on the side of the bed? [] 1   [] 2   [x] 3   [] 4   How much help from another person does the patient currently need. .. Total A Lot A Little None   4. Moving to and from a bed to a chair (including a wheelchair)? [] 1   [] 2   [x] 3   [] 4   5. Need to walk in hospital room? [] 1   [] 2   [x] 3   [] 4   6. Climbing 3-5 steps with a railing? [] 1   [] 2   [x] 3   [] 4   © 2007, Trustees of [facility] [employer], under license to [employer]. All rights reserved      Score:  Initial: 18 Most Recent: X (Date: -- )    Interpretation of Tool:  Represents activities that are increasingly more difficult (i.e. Bed mobility, Transfers, Gait). Medical Necessity:     · Patient demonstrates   · good  ·  rehab potential due to higher previous functional level. Reason for Services/Other Comments:  · Patient continues to require skilled intervention due to   · S/p surgical left LE. · .   Use of outcome tool(s) and clinical judgement create a POC that gives a: Clear prediction of patient's progress: LOW COMPLEXITY            TREATMENT:   (In addition to Assessment/Re-Assessment sessions the following treatments were rendered)   Pre-treatment Symptoms/Complaints:  \"I'm ready\"  Pain: Initial:   Pain Intensity 1: 0  Post Session:  0/10     Therapeutic Activity: (    15 min): Therapeutic activities including Bed transfers, Chair transfers, Toilet transfers, and static standing balance to improve mobility, strength and balance. Required minimal Safety awareness training;Verbal cues to promote static and dynamic balance in standing.      Gait Training (  10 min):  Gait training to improve and/or restore physical functioning as related to mobility, strength, balance and coordination. Ambulated 30 Feet (ft) with Contact guard assistance using a Walker, rolling and moderate Safety awareness training;Verbal cues related to their stride length, heel strike and posture to promote proper body alignment, promote proper body posture and promote proper body mechanics. Instruction in performance of step length and walker management to correct stride length and heel strike. Therapeutic Exercise: (  ):  Exercises per grid below to improve mobility, strength, balance and coordination. Required minimal visual, verbal, manual and tactile cues to promote proper body alignment, promote proper body posture and promote proper body mechanics. Progressed repetitions as indicated. Seated EOB  Date:  2/15/2020  Date:   Date:     Activity/Exercise Parameters Parameters Parameters   AP's  10 x's      LAQ's  10 x's      HIP ABD  10 x's     SEATED MARCHES  10 x's                            Braces/Orthotics/Lines/Etc:   · none  Treatment/Session Assessment:    · Response to Treatment: See above   · Interdisciplinary Collaboration:   o Physical Therapy Assistant  o Registered Nurse  · After treatment position/precautions:   o Up in chair  o Bed/Chair-wheels locked  o Bed in low position  o Call light within reach  o RN notified   · Compliance with Program/Exercises: Will assess as treatment progresses  · Recommendations/Intent for next treatment session: \"Next visit will focus on advancements to more challenging activities, reduction in assistance provided, and transfers, ambulation and mobility with therapeutic exercise left LE.   WBAT. \".   Total Treatment Duration:  PT Patient Time In/Time Out  Time In: 0928  Time Out: [address] [address] [facility]

## 2020-02-17 NOTE — PROGRESS NOTES
Progress Note    Patient: Destiny Pearson MRN: [id number]  SSN: [id number]    YOB: 1935  Age: 82 y.o. Sex: female      Admit Date: 2/13/2020    LOS: 4 days     Subjective:     Pain much better in her left hip    Objective:     Vitals:    02/16/20 1924 02/16/20 2312 02/17/20 0321 02/17/20 0740   BP: 127/65 135/74 114/61 124/67   Pulse: 79 85 77 76   Resp: 19 19 19 19   Temp: 98.5 °F (36.9 °C) 98.7 °F (37.1 °C) 98.9 °F (37.2 °C) 98.7 °F (37.1 °C)   SpO2: 92% 94% 93% 92%   Weight:       Height:            Intake and Output:  Current Shift: No intake/output data recorded. Last three shifts: No intake/output data recorded.         Skin -  incision is well healed with no redness or drainage  Motor and sensory function intact in LEFT LOWER extremity        Pulses palpable in LEFT LOWER extremity       Lab/Data Review:  CBC:   Lab Results   Component Value Date/Time    WBC 5.2 02/17/2020 05:54 AM    HGB 8.1 (L) 02/17/2020 05:54 AM    HCT 26.3 (L) 02/17/2020 05:54 AM     02/17/2020 05:54 AM            Assessment:     Acute postop blood loss anemia with hgb 8.1, POD 3 from percutaneous screw fixation left femoral neck    Plan:     Up with physical therapy, weightbearing as tolerated left lower extremity, aspirin and SCDs for DVT prophylaxis, she can have a dry dressing change daily and as needed will need follow-up in 2 weeks with orthopedics after discharge    Signed By: Aimee Olvera MD     February 17, 2020

## 2020-02-18 VITALS
SYSTOLIC BLOOD PRESSURE: 122 MMHG | RESPIRATION RATE: 16 BRPM | DIASTOLIC BLOOD PRESSURE: 86 MMHG | HEART RATE: 80 BPM | OXYGEN SATURATION: 94 % | WEIGHT: 135.5 LBS | BODY MASS INDEX: 22.57 KG/M2 | HEIGHT: 65 IN | TEMPERATURE: 98.2 F

## 2020-02-18 LAB
ABO + RH BLD: NORMAL
ANION GAP SERPL CALC-SCNC: 4 MMOL/L (ref 7–16)
BASOPHILS # BLD: 0 K/UL (ref 0–0.2)
BASOPHILS NFR BLD: 0 % (ref 0–2)
BLD PROD TYP BPU: NORMAL
BLD PROD TYP BPU: NORMAL
BLOOD GROUP ANTIBODIES SERPL: NORMAL
BPU ID: NORMAL
BPU ID: NORMAL
BUN SERPL-MCNC: 28 MG/DL (ref 8–23)
CALCIUM SERPL-MCNC: 9.6 MG/DL (ref 8.3–10.4)
CHLORIDE SERPL-SCNC: 109 MMOL/L (ref 98–107)
CO2 SERPL-SCNC: 29 MMOL/L (ref 21–32)
CREAT SERPL-MCNC: 1.29 MG/DL (ref 0.6–1)
CROSSMATCH RESULT,%XM: NORMAL
CROSSMATCH RESULT,%XM: NORMAL
DIFFERENTIAL METHOD BLD: ABNORMAL
EOSINOPHIL # BLD: 0.1 K/UL (ref 0–0.8)
EOSINOPHIL NFR BLD: 2 % (ref 0.5–7.8)
ERYTHROCYTE [DISTWIDTH] IN BLOOD BY AUTOMATED COUNT: 20.9 % (ref 11.9–14.6)
GLUCOSE SERPL-MCNC: 93 MG/DL (ref 65–100)
HCT VFR BLD AUTO: 26.9 % (ref 35.8–46.3)
HGB BLD-MCNC: 8.4 G/DL (ref 11.7–15.4)
IMM GRANULOCYTES # BLD AUTO: 0.1 K/UL (ref 0–0.5)
IMM GRANULOCYTES NFR BLD AUTO: 2 % (ref 0–5)
LYMPHOCYTES # BLD: 2.3 K/UL (ref 0.5–4.6)
LYMPHOCYTES NFR BLD: 49 % (ref 13–44)
MCH RBC QN AUTO: 31.8 PG (ref 26.1–32.9)
MCHC RBC AUTO-ENTMCNC: 31.2 G/DL (ref 31.4–35)
MCV RBC AUTO: 101.9 FL (ref 79.6–97.8)
MONOCYTES # BLD: 0.5 K/UL (ref 0.1–1.3)
MONOCYTES NFR BLD: 11 % (ref 4–12)
NEUTS SEG # BLD: 1.7 K/UL (ref 1.7–8.2)
NEUTS SEG NFR BLD: 36 % (ref 43–78)
NRBC # BLD: 0.02 K/UL (ref 0–0.2)
PLATELET # BLD AUTO: 176 K/UL (ref 150–450)
PMV BLD AUTO: 12.7 FL (ref 9.4–12.3)
POTASSIUM SERPL-SCNC: 4.3 MMOL/L (ref 3.5–5.1)
RBC # BLD AUTO: 2.64 M/UL (ref 4.05–5.2)
SODIUM SERPL-SCNC: 142 MMOL/L (ref 136–145)
SPECIMEN EXP DATE BLD: NORMAL
STATUS OF UNIT,%ST: NORMAL
STATUS OF UNIT,%ST: NORMAL
UNIT DIVISION, %UDIV: 0
UNIT DIVISION, %UDIV: 0
WBC # BLD AUTO: 4.7 K/UL (ref 4.3–11.1)

## 2020-02-18 PROCEDURE — 85025 COMPLETE CBC W/AUTO DIFF WBC: CPT

## 2020-02-18 PROCEDURE — 36415 COLL VENOUS BLD VENIPUNCTURE: CPT

## 2020-02-18 PROCEDURE — 97535 SELF CARE MNGMENT TRAINING: CPT

## 2020-02-18 PROCEDURE — 97530 THERAPEUTIC ACTIVITIES: CPT

## 2020-02-18 PROCEDURE — 97110 THERAPEUTIC EXERCISES: CPT

## 2020-02-18 PROCEDURE — 74011250637 HC RX REV CODE- 250/637: Performed by: ORTHOPAEDIC SURGERY

## 2020-02-18 PROCEDURE — 80048 BASIC METABOLIC PNL TOTAL CA: CPT

## 2020-02-18 RX ORDER — ASPIRIN 325 MG
325 TABLET ORAL DAILY
Qty: 6 TAB | Refills: 0 | Status: SHIPPED | OUTPATIENT
Start: 2020-02-18 | End: 2020-02-24

## 2020-02-18 RX ORDER — FERROUS SULFATE, DRIED 160(50) MG
1 TABLET, EXTENDED RELEASE ORAL
Qty: 90 TAB | Refills: 0 | Status: SHIPPED | OUTPATIENT
Start: 2020-02-18 | End: 2020-03-19

## 2020-02-18 RX ORDER — CEFPODOXIME PROXETIL 200 MG/1
200 TABLET, FILM COATED ORAL EVERY 12 HOURS
Qty: 2 TAB | Refills: 0 | Status: SHIPPED | OUTPATIENT
Start: 2020-02-18 | End: 2020-02-19

## 2020-02-18 RX ORDER — GUAIFENESIN 100 MG/5ML
81 LIQUID (ML) ORAL DAILY
Qty: 30 TAB | Refills: 0 | Status: SHIPPED | OUTPATIENT
Start: 2020-02-25 | End: 2020-03-26

## 2020-02-18 RX ORDER — OXYCODONE HYDROCHLORIDE 10 MG/1
10 TABLET ORAL
Qty: 18 TAB | Refills: 0 | Status: SHIPPED | OUTPATIENT
Start: 2020-02-18 | End: 2020-02-21

## 2020-02-18 RX ADMIN — Medication 10 ML: at 05:46

## 2020-02-18 RX ADMIN — LOSARTAN POTASSIUM 25 MG: 25 TABLET ORAL at 08:50

## 2020-02-18 RX ADMIN — ASPIRIN 81 MG 81 MG: 81 TABLET ORAL at 09:05

## 2020-02-18 RX ADMIN — LEVOTHYROXINE SODIUM 50 MCG: 0.05 TABLET ORAL at 05:45

## 2020-02-18 RX ADMIN — CEFPODOXIME PROXETIL 200 MG: 200 TABLET, FILM COATED ORAL at 08:51

## 2020-02-18 RX ADMIN — Medication 5 TABLET: at 08:51

## 2020-02-18 RX ADMIN — Medication 1 TABLET: at 08:51

## 2020-02-18 RX ADMIN — Medication 10 ML: at 05:45

## 2020-02-18 RX ADMIN — LORATADINE 10 MG: 10 TABLET ORAL at 09:00

## 2020-02-18 RX ADMIN — ACETAMINOPHEN 650 MG: 325 TABLET, FILM COATED ORAL at 05:45

## 2020-02-18 RX ADMIN — MONTELUKAST 10 MG: 10 TABLET, FILM COATED ORAL at 08:51

## 2020-02-18 RX ADMIN — PANTOPRAZOLE SODIUM 40 MG: 40 TABLET, DELAYED RELEASE ORAL at 05:45

## 2020-02-18 RX ADMIN — Medication 1 TABLET: at 11:15

## 2020-02-18 NOTE — PROGRESS NOTES
Problem: Self Care Deficits Care Plan (Adult)  Goal: *Acute Goals and Plan of Care (Insert Text)  Description  1. Patient will maintain WBAT status in LLE for 100% of treatment session and no verbal cues from therapist.   2. Patient will complete functional transfers with supervision while maintaining WBAT status in LLE and with adaptive equipment as needed. 3. Patient will complete lower body bathing and dressing with minimal assistance and adaptive equipment as needed. PARTIALLY MET  4. Patient will complete toileting and toilet transfer with supervision. 5. Patient will tolerate 20 minutes of OT treatment with as needed rest breaks to increase activity tolerance for ADLs. 6. Patient will participate in at least 20 minutes of BUE therapeutic exercises to strengthen BUE for functional transfers. Timeframe: 7 visits      Outcome: Progressing Towards Goal       OCCUPATIONAL THERAPY: Daily Note and AM    2/18/2020  INPATIENT: OT Visit Days: 3  [place] / [place] / Product Type: Medicare /      NAME/AGE/GENDER: Jessica Engle is a 82 y.o. female   PRIMARY DIAGNOSIS:  Closed left hip fracture ([facility] [address] [S72.002A]  MDS (myelodysplastic syndrome) ([facility] [D46.9] Closed left hip fracture ([facility] Closed left hip fracture (HCC)  Procedure(s) (LRB):  LEFT HIP PERCUTANEOUS PINNING CANNULATED SCREWS (Left)  4 Days Post-Op  ICD-10: Treatment Diagnosis:    · Pain in left hip (M25.552)  · Stiffness of Left Hip, Not elsewhere classified (M25.652)  · History of falling (Z91.81)   Precautions/Allergies:    WBAT LLE Bactrim [sulfamethoprim]; Levaquin [levofloxacin]; and Tramadol      ASSESSMENT:     Ms. Sanna Ardon is an 82year old female admitted after fall at [facility] while getting an infusion. Now sp above procedure and WBAT in LLE. At baseline she lives with her daughters and is typically independent with ADLs and driving. She uses either a rollator or RW.  She enjoys doing chair exercise class 2x/ week. 2/18/2020: Patient seated in chair upon arrival and agreeable to treatment. Pt walked to the sink with a rolling walker and SBA. Chair was brought up for the pt and she completed a.m self care with set up and SBA. Pt completed functional mobility back to the chair with same level of assist aforementioned. Pt left with belongings in reach and visitors in the room. Good effort. Continue POC. This section established at most recent assessment   PROBLEM LIST (Impairments causing functional limitations):  1. Decreased Strength  2. Decreased ADL/Functional Activities  3. Decreased Transfer Abilities  4. Decreased Ambulation Ability/Technique  5. Decreased Balance  6. Increased Pain  7. Decreased Activity Tolerance  8. Decreased Flexibility/Joint Mobility  9. Decreased Knowledge of Precautions   INTERVENTIONS PLANNED: (Benefits and precautions of occupational therapy have been discussed with the patient.)  1. Activities of daily living training  2. Adaptive equipment training  3. Therapeutic activity  4. Therapeutic exercise     TREATMENT PLAN: Frequency/Duration: Follow patient 6x/ week to address above goals. Rehabilitation Potential For Stated Goals: Good     REHAB RECOMMENDATIONS (at time of discharge pending progress):    Placement: It is my opinion, based on this patient's performance to date, that Ms. Magda Aguillon may benefit from intensive therapy at a [address] [place] [address] after discharge due to the functional deficits listed above that are likely to improve with skilled rehabilitation.   Equipment:    None at this time              OCCUPATIONAL PROFILE AND HISTORY:   History of Present Injury/Illness (Reason for Referral):  S/p above procedure   Past Medical History/Comorbidities:   Ms. Magda Aguillon  has a past medical history of Aplastic anemia ([facility] [address] (2/10/2017), Asthma, Bronchogenic cyst (2/10/2017), CHF (congestive heart failure) ([facility] [address] (2/10/2017), Chronic cough (2/10/2017), Chronic cystitis (2/10/2017), Hyperlipidemia (2/10/2017), Hypertension (2/10/2017), Hypothyroidism (2/10/2017), IBS (irritable bowel syndrome) (2/9/2017), Mediastinal tumor (2/9/2017), Myelodysplasia (myelodysplastic syndrome) ([facility] (2/10/2017), and Pneumonia. Ms. Gregory Demarco  has a past surgical history that includes hx cholecystectomy; hx tonsillectomy; hx hysterectomy; hx other surgical (Left); hx hernia repair; and hx cyst removal.  Social History/Living Environment:   Home Environment: Private residence  # Steps to Enter: 4  One/Two Story Residence: One story  Living Alone: No  Support Systems: Family member(s)  Patient Expects to be Discharged to[de-identified] Rehabilitation facility  Current DME Used/Available at Home: Keila Hylan, rolling, Walker, rollator, Commode, bedside  Prior Level of Function/Work/Activity:  At baseline she lives with her daughters and is typically independent with ADLs and driving. She uses either a rollator or RW. She enjoys doing chair exercise class 2x/ week. Number of Personal Factors/Comorbidities that affect the Plan of Care: Brief history (0):  LOW COMPLEXITY   ASSESSMENT OF OCCUPATIONAL PERFORMANCE[de-identified]   Activities of Daily Living:   Basic ADLs (From Assessment) Complex ADLs (From Assessment)   Feeding: Setup  Oral Facial Hygiene/Grooming: Setup  Bathing: Moderate assistance  Upper Body Dressing: Minimum assistance  Lower Body Dressing: Maximum assistance  Toileting:  Moderate assistance Instrumental ADL  Meal Preparation: Maximum assistance  Homemaking: Maximum assistance   Grooming/Bathing/Dressing Activities of Daily Living   Grooming  Grooming Assistance: Stand-by assistance  Position Performed: Seated in chair  Washing Face: Stand-by assistance  Washing Hands: Stand-by assistance  Brushing Teeth: Stand-by assistance     Upper Body Bathing  Bathing Assistance: Set-up  Position Performed: Seated in chair     Lower Body Bathing  Bathing Assistance: Stand-by assistance  Perineal  : Stand-by assistance  Position Performed: Standing  Lower Body : Stand-by assistance  Position Performed: Seated in chair     Upper Body Dressing Assistance  Pullover Shirt: Independent     Lower Body Dressing Assistance  Underpants: Independent  Pants With Elastic Waist: Independent Bed/Mat Mobility  Supine to Sit: Stand-by assistance  Sit to Stand: Contact guard assistance  Stand to Sit: Contact guard assistance  Scooting: Stand-by assistance     Most Recent Physical Functioning:   Gross Assessment:                  Posture:     Balance:    Bed Mobility:  Supine to Sit: Stand-by assistance  Scooting: Stand-by assistance  Wheelchair Mobility:     Transfers:  Sit to Stand: Contact guard assistance  Stand to Sit: Contact guard assistance            Patient Vitals for the past 6 hrs:   BP BP Patient Position SpO2 Pulse   20 0718 134/68 At rest 91 % 69   20 1150 122/86 At rest 94 % 80       Mental Status  Neurologic State: Alert  Orientation Level: Oriented X4  Cognition: Follows commands  Perception: Appears intact  Perseveration: No perseveration noted  Safety/Judgement: Awareness of environment, Fall prevention                          Physical Skills Involved:  1. Range of Motion  2. Balance  3. Strength  4. Activity Tolerance  5. Pain (acute) Cognitive Skills Affected (resulting in the inability to perform in a timely and safe manner):  1. NONE   Psychosocial Skills Affected:  1. Habits/Routines  2. Environmental Adaptation  3. Self-Awareness  4. Social Roles   Number of elements that affect the Plan of Care: 5+:  HIGH COMPLEXITY   CLINICAL DECISION MAKIN [facility] [address] [address] AM-PAC 6 Clicks   Daily Activity Inpatient Short Form  How much help from another person does the patient currently need. .. Total A Lot A Little None   1. Putting on and taking off regular lower body clothing? [] 1   [x] 2   [] 3   [] 4   2. Bathing (including washing, rinsing, drying)? [] 1   [x] 2   [] 3   [] 4   3.   Toileting, which includes using toilet, bedpan or urinal?   [] 1   [x] 2   [] 3   [] 4   4. Putting on and taking off regular upper body clothing? [] 1   [] 2   [x] 3   [] 4   5. Taking care of personal grooming such as brushing teeth? [] 1   [] 2   [x] 3   [] 4   6. Eating meals? [] 1   [] 2   [x] 3   [] 4   © 2007, Trustees of [address] [place] [address] [address] [id number], under license to [employer]. All rights reserved      Score:  Initial: 15 Most Recent: X (Date: -- )    Interpretation of Tool:  Represents activities that are increasingly more difficult (i.e. Bed mobility, Transfers, Gait). Medical Necessity:     · Patient demonstrates   · good  ·  rehab potential due to higher previous functional level. Reason for Services/Other Comments:  · Patient   · continues to require present interventions due to patient's inability to care for self at baseline level of function   · . Use of outcome tool(s) and clinical judgement create a POC that gives a: MODERATE COMPLEXITY         TREATMENT:   (In addition to Assessment/Re-Assessment sessions the following treatments were rendered)     Pre-treatment Symptoms/Complaints:    Pain: Initial:   Pain Intensity 1: 0  Post Session:  same     Therapeutic Activity: (13 minutes): Therapeutic activities including sit to stand and static/dynamic standing to improve mobility, strength and balance. Required SBA to promote static and dynamic balance in standing. Self Care: (20 minutes): Procedure(s) (per grid) utilized to improve and/or restore self-care/home management as related to dressing, bathing and grooming. Required minimal verbal and   cueing to facilitate activities of daily living skills and compensatory activities.           Braces/Orthotics/Lines/Etc:   · IV  · O2 Device: Nasal cannula  Treatment/Session Assessment:    · Response to Treatment:  tolerated well   · Interdisciplinary Collaboration:   o Certified Occupational Therapy Assistant  o Registered Nurse  · After treatment position/precautions:   o Up in chair  o Bed alarm/tab alert on  o Bed/Chair-wheels locked  o Call light within reach  o RN notified  o Family at bedside   · Compliance with Program/Exercises: Compliant all of the time. · Recommendations/Intent for next treatment session: \"Next visit will focus on advancements to more challenging activities and reduction in assistance provided\".   Total Treatment Duration:  OT Patient Time In/Time Out  Time In: 1047  Time Out: 1000 [facility] Inés Thacker

## 2020-02-18 NOTE — PROGRESS NOTES
Problem: Pressure Injury - Risk of  Goal: *Prevention of pressure injury  Description  Document Chuck Scale and appropriate interventions in the flowsheet. Outcome: Progressing Towards Goal  Note: Pressure Injury Interventions:  Sensory Interventions: Assess changes in LOC, Float heels, Discuss PT/OT consult with provider, Keep linens dry and wrinkle-free, Maintain/enhance activity level, Check visual cues for pain, Pressure redistribution bed/mattress (bed type)    Moisture Interventions: Absorbent underpads, Maintain skin hydration (lotion/cream), Contain wound drainage    Activity Interventions: Increase time out of bed    Mobility Interventions: HOB 30 degrees or less    Nutrition Interventions: Document food/fluid/supplement intake    Friction and Shear Interventions: Lift sheet, HOB 30 degrees or less                Problem: Patient Education: Go to Patient Education Activity  Goal: Patient/Family Education  Outcome: Progressing Towards Goal     Problem: Falls - Risk of  Goal: *Absence of Falls  Description  Document Amarjit Fall Risk and appropriate interventions in the flowsheet.   Outcome: Progressing Towards Goal  Note: Fall Risk Interventions:  Mobility Interventions: Bed/chair exit alarm, Patient to call before getting OOB         Medication Interventions: Bed/chair exit alarm, Patient to call before getting OOB    Elimination Interventions: Bed/chair exit alarm, Patient to call for help with toileting needs    History of Falls Interventions: Bed/chair exit alarm         Problem: Patient Education: Go to Patient Education Activity  Goal: Patient/Family Education  Outcome: Progressing Towards Goal     Problem: Patient Education: Go to Patient Education Activity  Goal: Patient/Family Education  Outcome: Progressing Towards Goal     Problem: Hip Fracture:Day of Admission Pre-op  Goal: Off Pathway (Use only if patient is Off Pathway)  Outcome: Progressing Towards Goal  Goal: Activity/Safety  Outcome: Progressing Towards Goal  Goal: Consults, if ordered  Outcome: Progressing Towards Goal  Goal: Diagnostic Test/Procedures  Outcome: Progressing Towards Goal  Goal: Nutrition/Diet  Outcome: Progressing Towards Goal  Goal: Medications  Outcome: Progressing Towards Goal  Goal: Respiratory  Outcome: Progressing Towards Goal  Goal: Treatments/Interventions/Procedures  Outcome: Progressing Towards Goal  Goal: Psychosocial  Outcome: Progressing Towards Goal  Goal: *Labs/Tests Within Defined Limits in Preparation for Surgery  Outcome: Progressing Towards Goal  Goal: *Optimal pain control at patient's stated goal  Outcome: Progressing Towards Goal  Goal: *Hemodynamically stable  Outcome: Progressing Towards Goal     Problem: Pain  Goal: *Control of Pain  Outcome: Progressing Towards Goal     Problem: Patient Education: Go to Patient Education Activity  Goal: Patient/Family Education  Outcome: Progressing Towards Goal

## 2020-02-18 NOTE — PROGRESS NOTES
Dr. Bob Learn office is okay with Aranesp being delayed if [facility] will monitor the Hgb. Facility liaison notified.

## 2020-02-18 NOTE — PROGRESS NOTES
2020         Post Op day: 4 Days Post-Op Procedure(s) (LRB):  LEFT HIP PERCUTANEOUS PINNING CANNULATED SCREWS (Left)      Admit Date: 2020  Admit Diagnosis: Closed left hip fracture (HCC) [S72.002A]  MDS (myelodysplastic syndrome) ([facility] [D46.9]       Principle Problem: [facility] Subjective: Doing well, No complaints, No SOB, No Chest Pain, No Nausea or Vomiting     Objective:   Vital Signs are Stable, No Acute Distress, Alert and Oriented, Dressing is Dry,  Neurovascular exam is normal.     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    IBS (irritable bowel syndrome) K58.9    Bronchogenic cyst J98.4    Aplastic anemia ([facility]) D61.9    CHF (congestive heart failure) ([facility]) I50.9    Myelodysplasia (myelodysplastic syndrome) ([facility]) D46.9    Hypothyroidism E03.9    CKD (chronic kidney disease) N18.9    Hyperlipidemia E78.5    Hypertension I10    Chronic cystitis N30.20    Chronic cough R05    MDS (myelodysplastic syndrome) ([facility]) D46.9    Closed left hip fracture ([facility]) S72.002A    CKD (chronic kidney disease) stage 3, GFR 30-59 ml/min ([facility]) N18.3    Pulmonary HTN ([facility]) I27.20    Chronic diastolic congestive heart failure ([facility]) I50.32    Dizziness R42      Patient Vitals for the past 8 hrs:   BP Temp Pulse Resp SpO2 Weight   20 0718 134/68 98.5 °F (36.9 °C) 69 18 91 %    20 0432 147/72 97.8 °F (36.6 °C) 70 20 95 % 61.5 kg (135 lb 8 oz)   20 0027 126/66 97.9 °F (36.6 °C) 75 16 94 %     Temp (24hrs), Av.9 °F (36.6 °C), Min:97.5 °F (36.4 °C), Max:98.5 °F (36.9 °C)    Body mass index is 22.55 kg/m².     Lab Results   Component Value Date/Time    HGB 8.4 (L) 2020 05:42 AM        Awaiting rehab placement     Continue PT       Signed By: JOAN Sher  2020,  8:11 AM

## 2020-02-18 NOTE — DISCHARGE SUMMARY
Hospitalist Discharge Summary     Admit Date:  2020  6:06 PM   Name:  Marita Kay   Age:  82 y.o.  :  1935   MRN:  [id number]   PCP:  Edison Cadena MD  Treatment Team: Attending Provider: Parminder Cho MD; Consulting Provider: Taty Brown MD; Utilization Review: Tori Gallego; Nurse Practitioner: Madiha Ibanez NP; Care Manager: Melchor Mesa RN; Charge Nurse: Virginia Torres; Occupational Therapist: Lynn Long; Physical Therapy Assistant: Dayami Garcia; Primary Nurse: Antonio Stage    Problem List for this Hospitalization:  Hospital Problems as of 2020 Date Reviewed: 2020          Codes Class Noted - Resolved POA    MDS (myelodysplastic syndrome) ([facility] ICD-10-CM: D46.9  ICD-9-CM: 238.75  2020 - Present Unknown        * (Principal) Closed left hip fracture ([facility] ICD-10-CM: E11.022C  ICD-9-CM: 820.8  2020 - Present Unknown        CKD (chronic kidney disease) stage 3, GFR 30-59 ml/min ([facility] ICD-10-CM: N18.3  ICD-9-CM: 585.3  2020 - Present Unknown        Pulmonary HTN ([facility] ICD-10-CM: I27.20  ICD-9-CM: 416.8  2020 - Present Unknown        Chronic diastolic congestive heart failure ([facility] ICD-10-CM: I50.32  ICD-9-CM: 428.32, 428.0  2020 - Present Unknown        Dizziness ICD-10-CM: R42  ICD-9-CM: 780.4  2020 - Present Unknown        Hypothyroidism ICD-10-CM: E03.9  ICD-9-CM: 244.9  2/10/2017 - Present Yes        Hyperlipidemia ICD-10-CM: E78.5  ICD-9-CM: 272.4  2/10/2017 - Present Yes        Hypertension ICD-10-CM: I10  ICD-9-CM: 401.9  2/10/2017 - Present Yes        Chronic cough ICD-10-CM: R05  ICD-9-CM: 786.2  2/10/2017 - Present Yes                Admission HPI from 2020:    82year old CF PMH myelodysplastic syndrome on Aranesp admitted  for left hip fx. After falling at an infusion center. Underwent left hip ORIF on , also seen by cardiology until 2/15.     Hospital Course:  Left hip dressing remains dry/intact. Patient alert and oriented x3. Afebrile, no leukocytosis. Hemoglobin stable 8.4. Accepted to [facility]. Follow up instructions and discharge meds at bottom of this note. Plan was discussed with patient. All questions answered. Patient was stable at time of discharge. 10 systems reviewed and negative except as noted in HPI. Diagnostic Imaging/Tests:   Xr Hip Lt W Or Wo Pelv 2-3 Vws    Result Date: 2/14/2020  PELVIS AND LEFT HIP, 3 views. HISTORY: Postop left hip pinning for subcapital femoral neck fracture. TECHNIQUE: AP view of the pelvis and coned down AP and frogleg lateral of the hip. FINDINGS: There are 3 screws through the femoral head and neck, transfixing the subcapital femoral neck fracture. IMPRESSION:  Status post left hip pinning. Xr Hip Lt W Or Wo Pelv 2-3 Vws    Result Date: 2/13/2020  Exam:  AP pelvis and left hip, left femur radiographs History:  pain, fall, 84 years Female Comparison: None available Findings: Apparent cortical step-off is seen of the left femoral head neck junctions superiorly, although not well visualized on the crosstable lateral view, this appearance is suspicious for an acute nondisplaced left femoral neck subcapital fracture. Normal alignment, joint spaces preserved. The visualized distal left femur appears intact. Normal mineralization. Calcific atherosclerosis. No evidence of knee joint effusion. Visualized soft tissues otherwise unremarkable. Impression: Findings suspicious for an acute nondisplaced left femoral neck subcapital fracture, although not seen on all views. If clinically indicated, consider MRI of the left hip for further evaluation. Xr Hip Lt During Or Proc    Result Date: 2/14/2020  Fluoroscopic images left hip HISTORY: Hip pinning. 2 fluoroscopic images of the left hip were obtained. Comparison was made to left hip radiographs 02/13/2020.  FINDINGS: 3 cannulated screws traverse the nondisplaced left femoral neck fracture. Alignment appears anatomic. The joint space is well-preserved. Fluoroscopy time: 1 minute and 3 seconds. IMPRESSION: Left hip pinning with anatomic alignment. Xr Femur Lt 2 V    Result Date: 2/13/2020  Exam:  AP pelvis and left hip, left femur radiographs History:  pain, fall, 84 years Female Comparison: None available Findings: Apparent cortical step-off is seen of the left femoral head neck junctions superiorly, although not well visualized on the crosstable lateral view, this appearance is suspicious for an acute nondisplaced left femoral neck subcapital fracture. Normal alignment, joint spaces preserved. The visualized distal left femur appears intact. Normal mineralization. Calcific atherosclerosis. No evidence of knee joint effusion. Visualized soft tissues otherwise unremarkable. Impression: Findings suspicious for an acute nondisplaced left femoral neck subcapital fracture, although not seen on all views. If clinically indicated, consider MRI of the left hip for further evaluation. Ct Head Wo Cont    Result Date: 2/13/2020  Examination: CT scan of the brain without contrast. History: fall, 84 years Female PT was at [facility] getting an infusion and when she was walking out of treatment room she got lightheaded and fell. Pt c/o pain in left hip, she is unable to bear weight Technique: 5 mm axial imaging of the brain from the posterior fossa to the vertex. All CT scans at this location are performed using dose modulation techniques as appropriate to a performed exam including the following: automated exposure control; adjustment of the mA and/or kV according to patient's size (this includes techniques or standardized protocols for targeted exams where dose is matched to indication/reason for exam; i.e. extremities or head); use of iterative reconstruction technique. Comparison:  None available Findings:   The brain parenchyma appears within normal limits for age.  The ventricles, sulci are age-appropriate. No intracranial hemorrhage or extra-axial collection is identified. No evidence of acute infarct. No mass effect or midline shift is present. Basal cisterns are intact. The visualized paranasal sinuses and mastoid air cells are clear. The orbits, bones, and soft tissues are normal in appearance. Impression:  Normal unenhanced CT of the brain for age. No acute intracranial abnormality. Xr Chest Port    Result Date: 2020  AP chest radiograph History: cough, 84 years Female Comparison: None available Findings:   Normal cardiomediastinal silhouette. Nonspecific mild diffuse interstitial prominence. Mild subsegmental atelectasis bilateral lung bases. No evidence of pneumothorax, pleural effusion, or air space consolidation. Evidence of cholecystectomy. Visualized soft tissue and osseous structures otherwise unremarkable. Impression:   Nonspecific mild diffuse interstitial prominence. Echocardiogram results:  Results for orders placed or performed during the hospital encounter of 20   2D ECHO LIMTED ADULT W OR WO CONTR    Narrative    [facility] [address] [address] [address] [facility]  [phone]    Transthoracic Echocardiogram  2D and Doppler    Patient: Hernan Moreland  MR #: [id number]  : 1935  Age: 82 years  Gender: Female  Study date: 2020  Account #: [de-identified]  Height: 65 in  Weight: 133.8 lb  BSA: 1.67 mï¾²  Status:Routine  Location: [facility]  BP: 130/ 61    Allergies: SULFAMETHOPRIM, LEVOFLOXACIN, TRAMADOL    Sonographer:  Rosemary Avila [facility]  Group:  [facility]  Referring Physician:  Emili Field MD  Reading Physician:  Lakshmi Stern. Mark Jacques MD [facility]    INDICATIONS: Evaluate EF/RVSP/RV function    PROCEDURE: This was a routine study. A transthoracic echocardiogram was  performed. The study included limited 2D imaging and limited spectral   Doppler.   Intravenous contrast (Definity, 2 ml) was administered to opacify the left  ventricle. Image quality was adequate. LEFT VENTRICLE: Systolic function was normal. Ejection fraction was estimated  in the range of 60 % to 65 %. There were no regional wall motion   abnormalities. RIGHT VENTRICLE: The size was normal. Systolic function was normal. There was  mild pulmonary artery hypertension. Estimated peak pressure was in the range   of  40-45 mmHg. SYSTEMIC VEINS: IVC: The inferior vena cava was normal in size and course. Respirophasic changes were normal.    TRICUSPID VALVE: The valve structure was normal. There was trivial  regurgitation. SUMMARY:    -  Left ventricle: Systolic function was normal. Ejection fraction was  estimated in the range of 60 % to 65 %. There were no regional wall motion  abnormalities. -  Right ventricle: The size was normal. Systolic function was normal. There  was mild pulmonary artery hypertension. SYSTEM MEASUREMENT TABLES    2D mode  LA Dimension (2D): 3.6 cm  IVS/LVPW (2D): 1.2  IVSd (2D): 1 cm  LVIDd (2D): 4.6 cm  LVIDs (2D): 2.9 cm  LVPWd (2D): 0.8 cm  RVIDd (2D): 2.6 cm    Prepared and signed by    Rachel Beyer MD [facility]  Signed 14-Feb-2020 15:32:32           All Micro Results     Procedure Component Value Units Date/Time    CULTURE, URINE [[id number]] Collected:  02/14/20 1020    Order Status:  Completed Specimen:  [place] Specimen Updated:  02/16/20 9689     Special Requests: NO SPECIAL REQUESTS        Culture result:       >100,000  COLONIES/mL  NORMAL SKIN JANICE ISOLATED      CULTURE, URINE [[id number]]     Order Status:  Canceled Specimen:  Clean catch           Labs: Results:       BMP, Mg, Phos Recent Labs     02/18/20  0542 02/17/20  0554    139   K 4.3 3.9   * 108*   CO2 29 26   AGAP 4* 5*   BUN 28* 25*   CREA 1.29* 1.21*   CA 9.6 9.1   GLU 93 92      CBC Recent Labs     02/18/20  0542 02/17/20  0554 02/16/20  0537   WBC 4.7 5.2 4.5   RBC 2.64* 2.59* 2.76*   HGB 8.4* 8.1* 8.8*   HCT 26.9* 26.3* 27.9*    162 154   GRANS 36* 33* 33*   LYMPH 49* 53* 50*   EOS 2 2 2   MONOS 11 12 13*   BASOS 0 0 0   IG 2 1 1   ANEU 1.7 1.7 1.5*   ABL 2.3 2.7 2.3   BINTA 0.1 0.1 0.1   ABM 0.5 0.6 0.6   ABB 0.0 0.0 0.0   AIG 0.1 0.0 0.0      LFT No results for input(s): SGOT, ALT, TBIL, AP, TP, ALB, GLOB, AGRAT, GPT in the last 72 hours.    Cardiac Testing Lab Results   Component Value Date/Time    Troponin-I, Qt. <0.02 (L) 02/14/2020 03:18 AM    Troponin-I, Qt. <0.02 (L) 02/14/2020 12:12 AM    Troponin-I, Qt. <0.02 (L) 02/13/2020 03:46 PM      Coagulation Tests Lab Results   Component Value Date/Time    Prothrombin time 13.6 02/13/2020 03:46 PM    INR 1.0 02/13/2020 03:46 PM      A1c Lab Results   Component Value Date/Time    Hemoglobin A1c, External 5.8 07/30/2014      Lipid Panel No results found for: CHOL, CHOLPOCT, CHOLX, CHLST, CHOLV, 125751, HDL, HDLP, LDL, LDLC, DLDLP, 024567, VLDLC, VLDL, TGLX, TRIGL, TRIGP, TGLPOCT, CHHD, CHHDX   Thyroid Panel No results found for: TSH, T4, FT4, TT3, T3U, TSHEXT     Most Recent UA Lab Results   Component Value Date/Time    Color YELLOW 02/13/2020 04:55 PM    Appearance CLOUDY 02/13/2020 04:55 PM    Specific gravity 1.010 02/13/2020 04:55 PM    pH (UA) 5.0 02/13/2020 04:55 PM    Protein NEGATIVE  02/13/2020 04:55 PM    Glucose NEGATIVE  02/13/2020 04:55 PM    Ketone NEGATIVE  02/13/2020 04:55 PM    Bilirubin NEGATIVE  02/13/2020 04:55 PM    Blood NEGATIVE  02/13/2020 04:55 PM    Urobilinogen 0.2 02/13/2020 04:55 PM    Nitrites NEGATIVE  02/13/2020 04:55 PM    Leukocyte Esterase SMALL (A) 02/13/2020 04:55 PM        Allergies   Allergen Reactions    Bactrim [Sulfamethoprim] Rash    Levaquin [Levofloxacin] Palpitations    Tramadol Unknown (comments)     Makes patient hallucinate     Immunization History   Administered Date(s) Administered    Influenza Vaccine 10/06/2014, 10/26/2015    TB Skin Test (PPD) Intradermal 02/13/2020       All Labs from Last 24 Hrs:  Recent Results (from the past 24 hour(s))   CBC WITH AUTOMATED DIFF    Collection Time: 02/18/20  5:42 AM   Result Value Ref Range    WBC 4.7 4.3 - 11.1 K/uL    RBC 2.64 (L) 4.05 - 5.2 M/uL    HGB 8.4 (L) 11.7 - 15.4 g/dL    HCT 26.9 (L) 35.8 - 46.3 %    .9 (H) 79.6 - 97.8 FL    MCH 31.8 26.1 - 32.9 PG    MCHC 31.2 (L) 31.4 - 35.0 g/dL    RDW 20.9 (H) 11.9 - 14.6 %    PLATELET 335 718 - 922 K/uL    MPV 12.7 (H) 9.4 - 12.3 FL    ABSOLUTE NRBC 0.02 0.0 - 0.2 K/uL    DF AUTOMATED      NEUTROPHILS 36 (L) 43 - 78 %    LYMPHOCYTES 49 (H) 13 - 44 %    MONOCYTES 11 4.0 - 12.0 %    EOSINOPHILS 2 0.5 - 7.8 %    BASOPHILS 0 0.0 - 2.0 %    IMMATURE GRANULOCYTES 2 0.0 - 5.0 %    ABS. NEUTROPHILS 1.7 1.7 - 8.2 K/UL    ABS. LYMPHOCYTES 2.3 0.5 - 4.6 K/UL    ABS. MONOCYTES 0.5 0.1 - 1.3 K/UL    ABS. EOSINOPHILS 0.1 0.0 - 0.8 K/UL    ABS. BASOPHILS 0.0 0.0 - 0.2 K/UL    ABS. IMM.  GRANS. 0.1 0.0 - 0.5 K/UL   METABOLIC PANEL, BASIC    Collection Time: 02/18/20  5:42 AM   Result Value Ref Range    Sodium 142 136 - 145 mmol/L    Potassium 4.3 3.5 - 5.1 mmol/L    Chloride 109 (H) 98 - 107 mmol/L    CO2 29 21 - 32 mmol/L    Anion gap 4 (L) 7 - 16 mmol/L    Glucose 93 65 - 100 mg/dL    BUN 28 (H) 8 - 23 MG/DL    Creatinine 1.29 (H) 0.6 - 1.0 MG/DL    GFR est AA 51 (L) >60 ml/min/1.73m2    GFR est non-AA 42 (L) >60 ml/min/1.73m2    Calcium 9.6 8.3 - 10.4 MG/DL       Discharge Exam:  Patient Vitals for the past 24 hrs:   Temp Pulse Resp BP SpO2   02/18/20 0718 98.5 °F (36.9 °C) 69 18 134/68 91 %   02/18/20 0432 97.8 °F (36.6 °C) 70 20 147/72 95 %   02/18/20 0027 97.9 °F (36.6 °C) 75 16 126/66 94 %   02/17/20 2002 97.8 °F (36.6 °C) 77 20 139/65 93 %   02/17/20 1517 97.5 °F (36.4 °C) 68 18 106/60 95 %   02/17/20 1126 98 °F (36.7 °C) 64 18 115/64 95 %   02/17/20 0941     93 %     Oxygen Therapy  O2 Sat (%): 91 % (02/18/20 0718)  Pulse via Oximetry: 81 beats per minute (02/17/20 0941)  O2 Device: Room air (02/17/20 0941)  O2 Flow Rate (L/min): 1 l/min (02/14/20 1733)    Intake/Output Summary (Last 24 hours) at 2/18/2020 0823  Last data filed at 2/18/2020 0437  Gross per 24 hour   Intake 355 ml   Output    Net 355 ml         Physical exam:  General:    Well nourished. Alert. No distress. Eyes:   Normal sclera. Extraocular movements intact. ENT:  Normocephalic, atraumatic. Moist mucous membranes  CV:   Regular rate and rhythm. No murmur, rub, or gallop. Lungs:  Clear to auscultation bilaterally. No wheezing, rhonchi, or rales. Abdomen: Soft, nontender, nondistended. Bowel sounds normal.   Extremities: Warm and dry. No cyanosis or edema. Neurologic: No focal deficits  Skin:     No rashes or jaundice. Psych:  Normal mood and affect. Discharge Info:   Current Discharge Medication List      START taking these medications    Details   aspirin 81 mg chewable tablet Take 1 Tab by mouth daily for 30 days. Qty: 30 Tab, Refills: 0      aspirin (ASPIRIN) 325 mg tablet Take 1 Tab by mouth daily for 6 days. Qty: 6 Tab, Refills: 0      calcium-vitamin D (OYSTER SHELL) 500 mg(1,250mg) -200 unit per tablet Take 1 Tab by mouth three (3) times daily (with meals) for 30 days. Qty: 90 Tab, Refills: 0      cefpodoxime (VANTIN) 200 mg tablet Take 1 Tab by mouth every twelve (12) hours for 1 day. Qty: 2 Tab, Refills: 0      oxyCODONE IR (ROXICODONE) 10 mg tab immediate release tablet Take 1 Tab by mouth every four (4) hours as needed for Pain for up to 3 days. Max Daily Amount: 60 mg.  Qty: 18 Tab, Refills: 0    Associated Diagnoses: Closed fracture of left hip, [facility]         CONTINUE these medications which have NOT CHANGED    Details   levothyroxine (SYNTHROID) 25 mcg tablet Take  by mouth Daily (before breakfast). atorvastatin (LIPITOR) 80 mg tablet Take 80 mg by mouth daily. losartan (COZAAR) 25 mg tablet Take  by mouth daily.       omeprazole (PRILOSEC) 40 mg capsule Take 40 mg by mouth daily. montelukast (SINGULAIR) 10 mg tablet Take 10 mg by mouth daily. acetaminophen (TYLENOL EXTRA STRENGTH) 500 mg tablet Take  by mouth every six (6) hours as needed for Pain. cetirizine (CHILDREN'S ZYRTEC ALLERGY) 10 mg TbDL Take  by mouth. Disposition: SNF    Activity: Activity as tolerated  Diet: DIET REGULAR    Follow-up Appointments   Procedures    FOLLOW UP VISIT Appointment in: 3 - 5 Days [facility] MD Ortho in 2 weeks     [facility] MD   Ortho in 2 weeks     Standing Status:   Standing     Number of Occurrences:   1     Order Specific Question:   Appointment in     Answer:   3 - 5 Days         Follow-up Information     Follow up With Specialties Details Why Contact Info    Richelle Fabian MD Orthopedic Surgery On 2/27/2020  at 2:20 [address]  [facility]  [phone]            Case reviewed with supervising physician - Dr. Hayde Rosenthal    Time spent in patient discharge planning and coordination 35 minutes.     Signed:  KELLEE Schmidt

## 2020-02-18 NOTE — PROGRESS NOTES
Message left for Dr Seema Steven RN to see if Aranesp infusion can be postponed until the patient discharged from [facility] facility.

## 2020-02-18 NOTE — PROGRESS NOTES
Problem: Mobility Impaired (Adult and Pediatric)  Goal: *Therapy Goal (Edit Goal, Insert Text)  Outcome: Progressing Towards Goal  Note:   LEFT LE WBAT   STG:  (1.)Ms. Deepthi Anglin will move from supine to sit and sit to supine , scoot up and down, and roll side to side with CONTACT GUARD ASSIST within 4 treatment day(s). (2.)Ms. Deepthi Anglin will transfer from bed to chair and chair to bed with CONTACT GUARD ASSIST using the least restrictive device within 4 treatment day(s). (3.)Ms. Deepthi Anglin will ambulate with CONTACT GUARD ASSIST for 200 feet with the least restrictive device within 4 treatment day(s). LTG:  (1.)Ms. Deepthi Anglin will move from supine to sit and sit to supine , scoot up and down, and roll side to side in bed with STAND BY ASSIST within 7 treatment day(s). (2.)Ms. Deepthi Anglin will transfer from bed to chair and chair to bed with STAND BY ASSIST using the least restrictive device within 7 treatment day(s). (3.)Ms. Deepthi Anglin will ambulate with STAND BY ASSIST for 500 feet with the least restrictive device within 7 treatment day(s). ________________________________________________________________________________________________       PHYSICAL THERAPY: Daily Note and AM 2/18/2020  INPATIENT: PT Visit Days : 4  [place] / [place] / Product Type: Medicare /       NAME/AGE/GENDER: Tami Le is a 82 y.o. female   PRIMARY DIAGNOSIS: Closed left hip fracture ([facility] [S72.002A]  MDS (myelodysplastic syndrome) ([facility] [D46.9] Closed left hip fracture ([facility] Closed left hip fracture (HCC)  Procedure(s) (LRB):  LEFT HIP PERCUTANEOUS PINNING CANNULATED SCREWS (Left)  4 Days Post-Op  ICD-10: Treatment Diagnosis:    · Generalized Muscle Weakness (M62.81)  · Other lack of cordination (R27.8)  · Difficulty in walking, Not elsewhere classified (R26.2)  · Other abnormalities of gait and mobility (R26.89)   Precaution/Allergies:  Bactrim [sulfamethoprim];  Levaquin [levofloxacin]; and Tramadol      ASSESSMENT:     The patient is supine in bed upon contact and agreeable to PT treatment. The patient performed transfers OOB without my assistance and transferred to the commode with CGA. She used the commode independently then stood into the walker with CGA and walked about 100 feet total.  Upon return she performed seated exercises below with fair ability. Good steady progress. ?????? ? ? This section established at most recent assessment??????????   PROBLEM LIST (Impairments causing functional limitations):  1. Decreased Strength affecting function   2. Decreased Transfer Abilities   3. Decreased Ambulation Ability/Technique   4. Decreased Balance   5. Decreased Activity Tolerance   6. Decreased Pacing Skills   7. Increased Fatigue affecting function   8. Decreased Flexibility/Joint Mobility   9. Decreased [place] with Home Exercise Program   REHABILITATION POTENTIAL FOR STATED GOALS: GOOD  PLAN OF CARE:INTERVENTIONS PLANNED: (Benefits and precautions of physical therapy have been discussed with the patient.)  1. balance exercise   2. bed mobility   3. family education   4. gait training   5. range of motion: active/assisted/passive   6. therapeutic activities   7. therapeutic exercise/strengthening   8. transfer training   FREQUENCY/DURATION: Follow patient 3 x's per week for until goals are met in order to address above goals. REHAB RECOMMENDATIONS (at time of discharge pending progress):    Placement: It is my opinion, based on this patient's performance to date, that Ms. Willie Jaime may benefit from intensive therapy at a [address] [place] [address] after discharge due to the functional deficits listed above that are likely to improve with skilled rehabilitation and concerns that he/she may be unsafe to be unsupervised at home due to recent falls and debility.    .  Equipment:    None at this time              HISTORY:   History of Present Injury/Illness (Reason for Referral): PER MD H&P   Gabriella Pinzon is a 82 y.o. female who fell on her left lower extremity from a standing height. She had just left a hospital clinic getting an injection. She denies any other problems really besides her left lower extremity. She says she has some soreness in her left shoulder but this is the only other thing that she has noticed. She is able to move her left shoulder around very well and she does seem to think that the pain is rather minimal.  Past Medical History/Comorbidities:   Ms. Mayte Barrera  has a past medical history of Aplastic anemia ([facility] (2/10/2017), Asthma, Bronchogenic cyst (2/10/2017), CHF (congestive heart failure) ([facility] (2/10/2017), Chronic cough (2/10/2017), Chronic cystitis (2/10/2017), Hyperlipidemia (2/10/2017), Hypertension (2/10/2017), Hypothyroidism (2/10/2017), IBS (irritable bowel syndrome) (2/9/2017), Mediastinal tumor (2/9/2017), Myelodysplasia (myelodysplastic syndrome) ([facility] (2/10/2017), and Pneumonia. Ms. Mayte Barrera  has a past surgical history that includes hx cholecystectomy; hx tonsillectomy; hx hysterectomy; hx other surgical (Left); hx hernia repair; and hx cyst removal.  Social History/Living Environment:   Home Environment: Private residence  # Steps to Enter: 4  One/Two Story Residence: One story  Living Alone: No  Support Systems: Family member(s)  Patient Expects to be Discharged to[de-identified] Rehabilitation facility  Current DME Used/Available at Home: Braden Canavan, rolling, Walker, rollator, Commode, bedside  Prior Level of Function/Work/Activity:  Had been limited with recent co-morbidities and treatments. Dominant Side:         RIGHT    Personal Factors:          Sex:  female        Age:  82 y.o.    Number of Personal Factors/Comorbidities that affect the Plan of Care: 0: LOW COMPLEXITY   EXAMINATION:   Most Recent Physical Functioning:   Gross Assessment:                  Posture:     Balance:    Bed Mobility:  Supine to Sit: Stand-by assistance  Scooting: Stand-by assistance  Wheelchair Mobility:     Transfers:  Sit to Stand: Contact guard assistance  Stand to Sit: Contact guard assistance  Gait:     Speed/Fidelia: Slow  Gait Abnormalities: Antalgic;Decreased step clearance  Distance (ft): 100 Feet (ft)  Assistive Device: Walker, rolling  Ambulation - Level of Assistance: Stand-by assistance;Contact guard assistance      Body Structures Involved:  1. Bones  2. Joints  3. Muscles  4. Ligaments Body Functions Affected:  1. Neuromusculoskeletal  2. Movement Related  3. Skin Related  4. Metobolic/Endocrine Activities and Participation Affected:  1. Communication  2. Mobility  3. Self Care  4. Community, Social and [place]   Number of elements that affect the Plan of Care: 1-2: LOW COMPLEXITY   CLINICAL PRESENTATION:   Presentation: Stable and uncomplicated: LOW COMPLEXITY   CLINICAL DECISION MAKIN [facility] Inpatient Short Form  How much difficulty does the patient currently have. .. Unable A Lot A Little None   1. Turning over in bed (including adjusting bedclothes, sheets and blankets)? [] 1   [] 2   [x] 3   [] 4   2. Sitting down on and standing up from a chair with arms ( e.g., wheelchair, bedside commode, etc.)   [] 1   [] 2   [x] 3   [] 4   3. Moving from lying on back to sitting on the side of the bed? [] 1   [] 2   [x] 3   [] 4   How much help from another person does the patient currently need. .. Total A Lot A Little None   4. Moving to and from a bed to a chair (including a wheelchair)? [] 1   [] 2   [x] 3   [] 4   5. Need to walk in hospital room? [] 1   [] 2   [x] 3   [] 4   6. Climbing 3-5 steps with a railing? [] 1   [] 2   [x] 3   [] 4   © , Trustees of [address] [place] [address] [address] [id number], under license to [employer].  All rights reserved      Score:  Initial: 18 Most Recent: X (Date: -- )    Interpretation of Tool:  Represents activities that are increasingly more difficult (i.e. Bed mobility, Transfers, Gait).    Medical Necessity:     · Patient demonstrates   · good  ·  rehab potential due to higher previous functional level. Reason for Services/Other Comments:  · Patient continues to require skilled intervention due to   · S/p surgical left LE. · .   Use of outcome tool(s) and clinical judgement create a POC that gives a: Clear prediction of patient's progress: LOW COMPLEXITY            TREATMENT:   (In addition to Assessment/Re-Assessment sessions the following treatments were rendered)   Pre-treatment Symptoms/Complaints:  \"I'm ready\"  Pain: Initial:   Pain Intensity 1: 2  Post Session:  0/10     Therapeutic Activity: (    15 min): Therapeutic activities including Bed transfers, Chair transfers, Toilet transfers, and ambulation to improve mobility, strength and balance. Required minimal   to promote static and dynamic balance in standing. Therapeutic Exercise: (10 Minutes ):  Exercises per grid below to improve mobility, strength, balance and coordination. Required minimal visual, verbal, manual and tactile cues to promote proper body alignment, promote proper body posture and promote proper body mechanics. Progressed repetitions as indicated. Seated EOB  Date:  2/15/2020  Date:  2/18/20 Date:     Activity/Exercise Parameters Parameters Parameters   AP's  10 x's  10x B    LAQ's  10 x's  20x B    HIP ABD  10 x's 20x B    SEATED MARCHES  10 x's 16x B                           Braces/Orthotics/Lines/Etc:   · none  Treatment/Session Assessment:    · Response to Treatment: See above   · Interdisciplinary Collaboration:   o Physical Therapy Assistant  o Registered Nurse  · After treatment position/precautions:   o Up in chair  o Bed/Chair-wheels locked  o Bed in low position  o Call light within reach  o RN notified  o Family at bedside   · Compliance with Program/Exercises: Compliant all of the time  · Recommendations/Intent for next treatment session:   \"Next visit will focus on advancements to more challenging activities, reduction in assistance provided, and transfers, ambulation and mobility with therapeutic exercise left LE.   WBAT. \".   Total Treatment Duration:  PT Patient Time In/Time Out  Time In: 0900  Time Out: 0930    Glynn Kumar PTA

## 2020-09-03 ENCOUNTER — APPOINTMENT (RX ONLY)
Dept: URBAN - METROPOLITAN AREA CLINIC 349 | Facility: CLINIC | Age: 85
Setting detail: DERMATOLOGY
End: 2020-09-03

## 2020-09-03 DIAGNOSIS — Z71.89 OTHER SPECIFIED COUNSELING: ICD-10-CM

## 2020-09-03 DIAGNOSIS — D22 MELANOCYTIC NEVI: ICD-10-CM

## 2020-09-03 DIAGNOSIS — L57.0 ACTINIC KERATOSIS: ICD-10-CM

## 2020-09-03 DIAGNOSIS — Z85.828 PERSONAL HISTORY OF OTHER MALIGNANT NEOPLASM OF SKIN: ICD-10-CM

## 2020-09-03 PROBLEM — D22.39 MELANOCYTIC NEVI OF OTHER PARTS OF FACE: Status: ACTIVE | Noted: 2020-09-03

## 2020-09-03 PROCEDURE — 17003 DESTRUCT PREMALG LES 2-14: CPT

## 2020-09-03 PROCEDURE — 99202 OFFICE O/P NEW SF 15 MIN: CPT | Mod: 25

## 2020-09-03 PROCEDURE — 17000 DESTRUCT PREMALG LESION: CPT

## 2020-09-03 PROCEDURE — ? COUNSELING

## 2020-09-03 PROCEDURE — ? EDUCATIONAL RESOURCES PROVIDED

## 2020-09-03 PROCEDURE — ? LIQUID NITROGEN

## 2020-09-03 ASSESSMENT — LOCATION DETAILED DESCRIPTION DERM
LOCATION DETAILED: NASAL SUPRATIP
LOCATION DETAILED: LEFT INFERIOR CENTRAL MALAR CHEEK
LOCATION DETAILED: LEFT CENTRAL ZYGOMA
LOCATION DETAILED: LEFT LATERAL MALAR CHEEK
LOCATION DETAILED: RIGHT INFERIOR CENTRAL MALAR CHEEK
LOCATION DETAILED: RIGHT UPPER CUTANEOUS LIP
LOCATION DETAILED: LEFT INFERIOR LATERAL MALAR CHEEK

## 2020-09-03 ASSESSMENT — LOCATION SIMPLE DESCRIPTION DERM
LOCATION SIMPLE: RIGHT CHEEK
LOCATION SIMPLE: LEFT ZYGOMA
LOCATION SIMPLE: LEFT CHEEK
LOCATION SIMPLE: NOSE
LOCATION SIMPLE: RIGHT LIP

## 2020-09-03 ASSESSMENT — LOCATION ZONE DERM
LOCATION ZONE: FACE
LOCATION ZONE: NOSE
LOCATION ZONE: LIP

## 2021-09-02 NOTE — PROGRESS NOTES
Conjuntivae and eyelids appear normal, Sclerae : White without injection ORTHO PROGRESS NOTE    February 15, 2020    Admit Date: 2020  Admit Diagnosis: Closed left hip fracture ([facility] [S72.002A]  MDS (myelodysplastic syndrome) ([facility] [D46.9]  Post Op day: 1 Day Post-Op      Subjective:     Gilmer Duty is a patient who is now 1 Day Post-Op  and has no complaints. Objective:     PT/OT:    Pending    Vital Signs:    Patient Vitals for the past 8 hrs:   BP Temp Pulse Resp SpO2   02/15/20 0730 122/73 98.2 °F (36.8 °C) 72 18 94 %   02/15/20 0455 114/62 98.7 °F (37.1 °C) 66 16 95 %     Temp (24hrs), Av.2 °F (36.8 °C), Min:97.7 °F (36.5 °C), Max:98.7 °F (37.1 °C)      LAB:    Recent Labs     02/15/20  0607   HGB 8.6*   WBC 4.3   *       I/O:  No intake/output data recorded.  1901 - 02/15 0700  In: 1310 [I.V.:1000]  Out: 2775 [Urine:2725]    Physical Exam:    Awake and in no acute distress. Mood and affect appropriate. Respirations unlabored and no evidence cyanosis. Calves nontender. Abdomen soft and nontender. Dressing clean/dry  No new neurologic deficit.     Assessment:      Patient Active Problem List   Diagnosis Code    Mediastinal tumor D49.89    IBS (irritable bowel syndrome) K58.9    Bronchogenic cyst J98.4    Aplastic anemia (HCC) D61.9    CHF (congestive heart failure) (HCC) I50.9    Myelodysplasia (myelodysplastic syndrome) (HCC) D46.9    Hypothyroidism E03.9    CKD (chronic kidney disease) N18.9    Hyperlipidemia E78.5    Hypertension I10    Chronic cystitis N30.20    Chronic cough R05    MDS (myelodysplastic syndrome) (HCC) D46.9    Closed left hip fracture ([facility] S72.002A    CKD (chronic kidney disease) stage 3, GFR 30-59 ml/min (HCC) N18.3    Pulmonary HTN (HCC) I27.20    Chronic diastolic congestive heart failure (HCC) I50.32    Dizziness R42       1 Day Post-Op STATUS POST Procedure(s):  LEFT HIP PERCUTANEOUS PINNING CANNULATED SCREWS      Plan:     Continue PT/OT/Rehab       Signed By: Jocelin Troncoso, MD

## 2021-09-30 ENCOUNTER — APPOINTMENT (RX ONLY)
Dept: URBAN - METROPOLITAN AREA CLINIC 349 | Facility: CLINIC | Age: 86
Setting detail: DERMATOLOGY
End: 2021-09-30

## 2021-09-30 DIAGNOSIS — D22 MELANOCYTIC NEVI: ICD-10-CM

## 2021-09-30 DIAGNOSIS — L57.0 ACTINIC KERATOSIS: ICD-10-CM

## 2021-09-30 DIAGNOSIS — L82.1 OTHER SEBORRHEIC KERATOSIS: ICD-10-CM

## 2021-09-30 DIAGNOSIS — Z71.89 OTHER SPECIFIED COUNSELING: ICD-10-CM

## 2021-09-30 DIAGNOSIS — Z85.828 PERSONAL HISTORY OF OTHER MALIGNANT NEOPLASM OF SKIN: ICD-10-CM

## 2021-09-30 PROBLEM — D22.5 MELANOCYTIC NEVI OF TRUNK: Status: ACTIVE | Noted: 2021-09-30

## 2021-09-30 PROBLEM — D22.39 MELANOCYTIC NEVI OF OTHER PARTS OF FACE: Status: ACTIVE | Noted: 2021-09-30

## 2021-09-30 PROCEDURE — ? EDUCATIONAL RESOURCES PROVIDED

## 2021-09-30 PROCEDURE — 99213 OFFICE O/P EST LOW 20 MIN: CPT | Mod: 25

## 2021-09-30 PROCEDURE — ? LIQUID NITROGEN

## 2021-09-30 PROCEDURE — 17000 DESTRUCT PREMALG LESION: CPT

## 2021-09-30 PROCEDURE — ? COUNSELING

## 2021-09-30 ASSESSMENT — LOCATION SIMPLE DESCRIPTION DERM
LOCATION SIMPLE: RIGHT LIP
LOCATION SIMPLE: LEFT UPPER BACK
LOCATION SIMPLE: RIGHT CHEEK
LOCATION SIMPLE: LEFT CHEEK
LOCATION SIMPLE: NOSE
LOCATION SIMPLE: RIGHT UPPER BACK

## 2021-09-30 ASSESSMENT — LOCATION ZONE DERM
LOCATION ZONE: NOSE
LOCATION ZONE: FACE
LOCATION ZONE: TRUNK
LOCATION ZONE: LIP

## 2021-09-30 ASSESSMENT — LOCATION DETAILED DESCRIPTION DERM
LOCATION DETAILED: RIGHT UPPER CUTANEOUS LIP
LOCATION DETAILED: LEFT INFERIOR CENTRAL MALAR CHEEK
LOCATION DETAILED: NASAL SUPRATIP
LOCATION DETAILED: LEFT SUPERIOR MEDIAL UPPER BACK
LOCATION DETAILED: RIGHT MEDIAL UPPER BACK
LOCATION DETAILED: RIGHT INFERIOR CENTRAL MALAR CHEEK

## 2021-09-30 NOTE — PROCEDURE: LIQUID NITROGEN
Show Aperture Variable?: Yes
Detail Level: Detailed
Duration Of Freeze Thaw-Cycle (Seconds): 3
Number Of Freeze-Thaw Cycles: 2 freeze-thaw cycles
Consent: The patient's consent was obtained including but not limited to risks of crusting, scabbing, blistering, scarring, darker or lighter pigmentary change, recurrence, incomplete removal and infection.
Post-Care Instructions: I reviewed with the patient in detail post-care instructions. Patient is to wear sunprotection, and avoid picking at any of the treated lesions. Pt may apply Vaseline to crusted or scabbing areas.
Render Post-Care Instructions In Note?: no

## 2022-03-18 PROBLEM — E03.9 HYPOTHYROIDISM: Status: ACTIVE | Noted: 2017-02-10

## 2022-03-18 PROBLEM — N30.20 CHRONIC CYSTITIS: Status: ACTIVE | Noted: 2017-02-10

## 2022-03-18 PROBLEM — R05.3 CHRONIC COUGH: Status: ACTIVE | Noted: 2017-02-10

## 2022-03-18 PROBLEM — D49.89 MEDIASTINAL TUMOR: Status: ACTIVE | Noted: 2017-02-09

## 2022-03-18 PROBLEM — K58.9 IBS (IRRITABLE BOWEL SYNDROME): Status: ACTIVE | Noted: 2017-02-09

## 2022-03-18 PROBLEM — N18.9 CKD (CHRONIC KIDNEY DISEASE): Status: ACTIVE | Noted: 2017-02-10

## 2022-03-19 PROBLEM — I50.32 CHRONIC DIASTOLIC CONGESTIVE HEART FAILURE (HCC): Status: ACTIVE | Noted: 2020-02-13

## 2022-03-19 PROBLEM — D46.9 MYELODYSPLASIA (MYELODYSPLASTIC SYNDROME) (HCC): Status: ACTIVE | Noted: 2017-02-10

## 2022-03-19 PROBLEM — J98.4 BRONCHOGENIC CYST: Status: ACTIVE | Noted: 2017-02-10

## 2022-03-19 PROBLEM — E78.5 HYPERLIPIDEMIA: Status: ACTIVE | Noted: 2017-02-10

## 2022-03-19 PROBLEM — S72.002A CLOSED LEFT HIP FRACTURE (HCC): Status: ACTIVE | Noted: 2020-02-13

## 2022-03-19 PROBLEM — I10 HYPERTENSION: Status: ACTIVE | Noted: 2017-02-10

## 2022-03-19 PROBLEM — I50.9 CHF (CONGESTIVE HEART FAILURE) (HCC): Status: ACTIVE | Noted: 2017-02-10

## 2022-03-19 PROBLEM — D61.9 APLASTIC ANEMIA (HCC): Status: ACTIVE | Noted: 2017-02-10

## 2022-03-19 PROBLEM — D46.9 MDS (MYELODYSPLASTIC SYNDROME) (HCC): Status: ACTIVE | Noted: 2020-02-13

## 2022-03-19 PROBLEM — R42 DIZZINESS: Status: ACTIVE | Noted: 2020-02-13

## 2022-03-20 PROBLEM — I27.20 PULMONARY HTN (HCC): Status: ACTIVE | Noted: 2020-02-13

## 2022-03-20 PROBLEM — N18.30 CKD (CHRONIC KIDNEY DISEASE) STAGE 3, GFR 30-59 ML/MIN (HCC): Status: ACTIVE | Noted: 2020-02-13

## 2022-04-19 ENCOUNTER — HOSPITAL ENCOUNTER (OUTPATIENT)
Dept: REHABILITATION | Age: 87
Discharge: HOME | End: 2022-04-19
Payer: MEDICARE

## 2022-04-19 ENCOUNTER — HOSPITAL ENCOUNTER (OUTPATIENT)
Dept: SURGERY | Age: 87
Discharge: HOME | End: 2022-04-19
Payer: MEDICARE

## 2022-04-19 VITALS
OXYGEN SATURATION: 96 % | DIASTOLIC BLOOD PRESSURE: 66 MMHG | HEART RATE: 71 BPM | TEMPERATURE: 97.4 F | HEIGHT: 65 IN | WEIGHT: 123 LBS | SYSTOLIC BLOOD PRESSURE: 153 MMHG | BODY MASS INDEX: 20.49 KG/M2 | RESPIRATION RATE: 18 BRPM

## 2022-04-19 LAB
ANION GAP SERPL CALC-SCNC: 7 MMOL/L (ref 7–16)
APTT PPP: 28.8 SEC (ref 24.1–35.1)
ATRIAL RATE: 64 BPM
BACTERIA SPEC CULT: NORMAL
BASOPHILS # BLD: 0 K/UL (ref 0–0.2)
BASOPHILS NFR BLD: 0 % (ref 0–2)
BUN SERPL-MCNC: 33 MG/DL (ref 8–23)
CALCIUM SERPL-MCNC: 9.8 MG/DL (ref 8.3–10.4)
CALCULATED P AXIS, ECG09: 69 DEGREES
CALCULATED R AXIS, ECG10: 13 DEGREES
CALCULATED T AXIS, ECG11: 65 DEGREES
CHLORIDE SERPL-SCNC: 111 MMOL/L (ref 98–107)
CO2 SERPL-SCNC: 23 MMOL/L (ref 21–32)
CREAT SERPL-MCNC: 1.17 MG/DL (ref 0.6–1)
DIAGNOSIS, 93000: NORMAL
DIFFERENTIAL METHOD BLD: ABNORMAL
EOSINOPHIL # BLD: 0.1 K/UL (ref 0–0.8)
EOSINOPHIL NFR BLD: 1 % (ref 0.5–7.8)
ERYTHROCYTE [DISTWIDTH] IN BLOOD BY AUTOMATED COUNT: 21.9 % (ref 11.9–14.6)
EST. AVERAGE GLUCOSE BLD GHB EST-MCNC: NORMAL MG/DL
GLUCOSE SERPL-MCNC: 80 MG/DL (ref 65–100)
HBA1C MFR BLD: 4.5 % (ref 4.2–6.3)
HCT VFR BLD AUTO: 31.1 % (ref 35.8–46.3)
HGB BLD-MCNC: 10.1 G/DL (ref 11.7–15.4)
IMM GRANULOCYTES # BLD AUTO: 0.1 K/UL (ref 0–0.5)
IMM GRANULOCYTES NFR BLD AUTO: 1 % (ref 0–5)
INR PPP: 1
LYMPHOCYTES # BLD: 3 K/UL (ref 0.5–4.6)
LYMPHOCYTES NFR BLD: 42 % (ref 13–44)
MCH RBC QN AUTO: 34.8 PG (ref 26.1–32.9)
MCHC RBC AUTO-ENTMCNC: 32.5 G/DL (ref 31.4–35)
MCV RBC AUTO: 107.2 FL (ref 79.6–97.8)
MONOCYTES # BLD: 0.8 K/UL (ref 0.1–1.3)
MONOCYTES NFR BLD: 12 % (ref 4–12)
NEUTS SEG # BLD: 3.1 K/UL (ref 1.7–8.2)
NEUTS SEG NFR BLD: 44 % (ref 43–78)
NRBC # BLD: 0.04 K/UL (ref 0–0.2)
P-R INTERVAL, ECG05: 186 MS
PLATELET # BLD AUTO: 301 K/UL (ref 150–450)
PMV BLD AUTO: 12.7 FL (ref 9.4–12.3)
POTASSIUM SERPL-SCNC: 3.9 MMOL/L (ref 3.5–5.1)
PROTHROMBIN TIME: 13.8 SEC (ref 12.6–14.5)
Q-T INTERVAL, ECG07: 410 MS
QRS DURATION, ECG06: 90 MS
QTC CALCULATION (BEZET), ECG08: 422 MS
RBC # BLD AUTO: 2.9 M/UL (ref 4.05–5.2)
SERVICE CMNT-IMP: NORMAL
SODIUM SERPL-SCNC: 141 MMOL/L (ref 136–145)
VENTRICULAR RATE, ECG03: 64 BPM
WBC # BLD AUTO: 7.1 K/UL (ref 4.3–11.1)

## 2022-04-19 PROCEDURE — 77030027138 HC INCENT SPIROMETER -A

## 2022-04-19 PROCEDURE — 94664 DEMO&/EVAL PT USE INHALER: CPT

## 2022-04-19 PROCEDURE — 94760 N-INVAS EAR/PLS OXIMETRY 1: CPT

## 2022-04-19 PROCEDURE — 85610 PROTHROMBIN TIME: CPT

## 2022-04-19 PROCEDURE — 93005 ELECTROCARDIOGRAM TRACING: CPT

## 2022-04-19 PROCEDURE — 85730 THROMBOPLASTIN TIME PARTIAL: CPT

## 2022-04-19 PROCEDURE — 83036 HEMOGLOBIN GLYCOSYLATED A1C: CPT

## 2022-04-19 PROCEDURE — 80048 BASIC METABOLIC PNL TOTAL CA: CPT

## 2022-04-19 PROCEDURE — 85025 COMPLETE CBC W/AUTO DIFF WBC: CPT

## 2022-04-19 PROCEDURE — 97161 PT EVAL LOW COMPLEX 20 MIN: CPT

## 2022-04-19 PROCEDURE — 87641 MR-STAPH DNA AMP PROBE: CPT

## 2022-04-19 PROCEDURE — 77030012341 HC CHMB SPCR OPTC MDI VYRM -A

## 2022-04-19 RX ORDER — AMLODIPINE BESYLATE 5 MG/1
5 TABLET ORAL DAILY
COMMUNITY
Start: 2022-02-17

## 2022-04-19 RX ORDER — ALBUTEROL SULFATE 90 UG/1
2 AEROSOL, METERED RESPIRATORY (INHALATION)
COMMUNITY
Start: 2022-03-02

## 2022-04-19 NOTE — PERIOP NOTES
PLEASE CONTINUE TAKING ALL PRESCRIPTION MEDICATIONS UP TO THE DAY OF SURGERY UNLESS OTHERWISE DIRECTED BELOW. DISCONTINUE all vitamins, herbals and supplements 21 days prior to surgery. DISCONTINUE Non-Steriodal Anti-Inflammatory (NSAIDS) such as Advil, Ibuprofen, and Aleve 5 days prior to surgery. Home Medications to HOLD      All vitamins, supplements, and herbals stop 21 days prior to surgery   All NSAIDs such as Advil, Aleve, Ibuprofen, Diclofenac, Naproxen, etc. Stop 5 days prior to surgery. Home Medications to take  the day of surgery   ProAir if needed, Amlodipine, Singulair, Norco if needed, Omeprazole Levothyroxine,  Nitroglycerin if needed        Comments      BRING: Nitroglycerin, Omeprazole, inhaler, bottle of soap, incentive spirometer           Please do not bring home medications with you on the day of surgery unless otherwise directed by your nurse. If you are instructed to bring home medications, please give them to your nurse as they will be administered by the nursing staff. If you have any questions, please call [facility] [phone] or [facility] [phone]. Copy of above instructions given to patient.

## 2022-04-19 NOTE — PERIOP NOTES
Patient verified name and . Order for consent found in EHR and matches case posting; patient verified. Type 3 surgery, joint assessment complete. Labs per surgeon: CBC,BMP, PT/PTT, HgbA1c; results pending. T&S DOS and POC glucose DOS; orders signed and held in EHR. Labs per anesthesia protocol: no additional  EKG: Completed today; results within anesthesia limits. Oncology note (22), ,Cardiology note (3/1/22), Nephrology note (2/10/22), Urology note (21), and Echo (3/15/22) in EHR. MRSA/MSSA swab collected; pharmacy to review and dose antibiotic as appropriate. Hospital approved surgical skin cleanser and instructions to return bottle on DOS given per hospital policy. Patient provided with handouts including Guide to Surgery, Pain Management, Hand Hygiene, Blood Transfusion Education, and [place] Anesthesia Brochure. Patient answered medical/surgical history questions at their best of ability. All prior to admission medications documented in [facility]. Original medication prescription bottle NOT visualized during patient appointment. Patient instructed to hold all vitamins, supplements, herbals 3 weeks prior to surgery and NSAIDS 5 days prior to surgery. Patient teach back successful and patient demonstrates knowledge of instruction.

## 2022-04-19 NOTE — ADVANCED PRACTICE NURSE
Total Joint Surgery Preoperative Chart Review      Patient ID:  Virginia Bautista  [id number]  82 y.o.  1935  Surgeon: Dr. Alda Ordoñez  Date of Surgery: 5/12/2022  Procedure: Total Left Hip Arthroplasty  Primary Care Physician: Rosemary Joyce [phone]  Specialty Physician(s):      Subjective:   Virginia Bautista is a 82 y.o. WHITE/NON- female who presents for preoperative evaluation for Total Left Hip arthroplasty. This is a preoperative chart review note based on data collected by the nurse at the surgical Pre-Assessment visit. Past Medical History:   Diagnosis Date    Adverse effect of anesthesia     delayed awakening     Aplastic anemia (HCC) 2/10/2017    Asthma     PRN inhaler-last used 3/2022    Atherosclerosis of both carotid arteries     asymptomatic     Bronchogenic cyst 2/10/2017    --DR Leigh Ann Lowry    CHF (congestive heart failure) ([facility] [place] [facility] 2/10/2017    Chronic cough 2/10/2017    Chronic cystitis 2/10/2017    --UROLOGIST - DR Jesse Osorio    Chronic kidney disease, stage 3a ([facility] [place] [facility]     Followed by Dr. Robbie Saba and Dr. Conner Mata (dyspnea on exertion)     Ongoing since COVID diagnosis in January. Pt states improving, ProAir PRN.      Dyslipidemia     GERD (gastroesophageal reflux disease)     H/O echocardiogram 03/15/2022    EF >65%    History of COVID-19 01/2022    History of MRSA infection     History of pneumonia     WITH MRSA / EMPYEMA /SEPSIS/ DEHYDRATION / RICHAR - RESOLVED WAS IN REHAB FOR A WHILE    Hyperlipidemia 2/10/2017    Hypertension 2/10/2017    Hypothyroidism 2/10/2017    IBS (irritable bowel syndrome) 2/9/2017    --DIVERTICULOSIS - [facility]  - DR Wiliam Cunningham    Mediastinal tumor 2/9/2017    --S/P RESECTION  - SEEN CARDIOLOGIST - STRESS TEST - NORMAL 2012    Myelodysplasia (myelodysplastic syndrome) ([facility] [place] [facility] 2/10/2017    --SEES HEMONC    Nonischemic cardiomyopathy ([facility] [place] [facility]     hx-EF has normalized, followed by Dr. Eufemia William Pulmonary hypertension ([facility]     Followed by Dr. Ignacio Acosta Recurrent UTI     Followed by Dr. Maged Johnson Seasonal allergic conjunctivitis     Takotsubo cardiomyopathy     History       Past Surgical History:   Procedure Laterality Date    HX CHOLECYSTECTOMY      HX CYST REMOVAL      MEDIASTINAL TUMOR  - BENIGN    HX GI      bleeding ulcer repair     HX HERNIA REPAIR      HX HIP FRACTURE TX Left 2020    HX HYSTERECTOMY      HX OTHER SURGICAL Left     STAPEDECTOMY     HX TONSILLECTOMY       Family History   Problem Relation Age of Onset    Cancer Mother         BREAST    Heart Disease Father     Diabetes Brother     Diabetes Other         SON; DAUGHTER      Social History     Tobacco Use    Smoking status: Never Smoker    Smokeless tobacco: Never Used   Substance Use Topics    Alcohol use: No       Prior to Admission medications    Medication Sig Start Date End Date Taking? Authorizing Provider   albuterol (PROVENTIL HFA, VENTOLIN HFA, PROAIR HFA) 90 mcg/actuation inhaler Take 2 Puffs by inhalation every four (4) hours as needed. 3/2/22  Yes Provider, Historical   amLODIPine (NORVASC) 5 mg tablet Take 5 mg by mouth daily. 2/17/22  Yes Provider, Historical   aspirin delayed-release 81 mg tablet Take  by mouth daily. Yes Provider, Historical   darbepoetin orlando (Aranesp, in polysorbate,) 10 mcg/0.4 mL syrg injection by IntraVENous route once. Every 21 days   Yes Provider, Historical   estradioL (Estrace) 0.01 % (0.1 mg/gram) vaginal cream Insert 2 g into vagina daily. Yes Provider, Historical   HYDROcodone-acetaminophen (Norco) 5-325 mg per tablet Take 1 Tablet by mouth every six (6) hours as needed. Patient takes 0.5 tab daily    Yes Provider, Historical   nitroglycerin (Nitrostat) 0.4 mg SL tablet 0.4 mg by SubLINGual route every five (5) minutes as needed for Chest Pain. Up to 3 doses. Yes Provider, Historical   therapeutic multivitamin (THERAGRAN) tablet Take 1 Tab by mouth daily.    Yes Provider, Historical   cholecalciferol (Vitamin D3) 25 mcg (1,000 unit) cap Take  by mouth daily. Yes Provider, Historical   levothyroxine (SYNTHROID) 25 mcg tablet Take  by mouth Daily (before breakfast). Yes Provider, Historical   atorvastatin (LIPITOR) 80 mg tablet Take 80 mg by mouth nightly. Yes Provider, Historical   losartan (COZAAR) 25 mg tablet Take  by mouth two (2) times a day. Yes Provider, Historical   omeprazole (PRILOSEC) 40 mg capsule Take 40 mg by mouth daily. Yes Provider, Historical   montelukast (SINGULAIR) 10 mg tablet Take 10 mg by mouth daily. Yes Provider, Historical   acetaminophen (TYLENOL EXTRA STRENGTH) 500 mg tablet Take  by mouth every six (6) hours as needed for Pain. Yes Provider, Historical   cetirizine (CHILDREN'S ZYRTEC ALLERGY) 10 mg TbDL Take  by mouth.    Yes Provider, Historical     Allergies   Allergen Reactions    Bactrim [Sulfamethoprim] Rash    Levaquin [Levofloxacin] Palpitations    Tramadol Unknown (comments)     Makes patient hallucinate          Objective:     Physical Exam:   Patient Vitals for the past 24 hrs:   Temp Pulse Resp BP SpO2   04/19/22 0940 97.4 °F (36.3 °C) 71 18 (!) 153/66 92 %       ECG:    EKG Results     Procedure 720 Value Units Date/Time    EKG, 12 LEAD, INITIAL [[id number]] Collected: 04/19/22 0955    Order Status: Completed Updated: 04/19/22 1228     Ventricular Rate 64 BPM      Atrial Rate 64 BPM      P-R Interval 186 ms      QRS Duration 90 ms      Q-T Interval 410 ms      QTC Calculation (Bezet) 422 ms      Calculated P Axis 69 degrees      Calculated R Axis 13 degrees      Calculated T Axis 65 degrees      Diagnosis --     Sinus rhythm with Premature atrial complexes  Otherwise normal ECG  When compared with ECG of 13-FEB-2020 15:34,  No significant change was found            Data Review:   Labs:   Recent Results (from the past 24 hour(s))   EKG, 12 LEAD, INITIAL    Collection Time: 04/19/22  9:55 AM   Result Value Ref Range Ventricular Rate 64 BPM    Atrial Rate 64 BPM    P-R Interval 186 ms    QRS Duration 90 ms    Q-T Interval 410 ms    QTC Calculation (Bezet) 422 ms    Calculated P Axis 69 degrees    Calculated R Axis 13 degrees    Calculated T Axis 65 degrees    Diagnosis       Sinus rhythm with Premature atrial complexes  Otherwise normal ECG  When compared with ECG of 13-FEB-2020 15:34,  No significant change was found     CBC WITH AUTOMATED DIFF    Collection Time: 04/19/22 10:08 AM   Result Value Ref Range    WBC 7.1 4.3 - 11.1 K/uL    RBC 2.90 (L) 4.05 - 5.2 M/uL    HGB 10.1 (L) 11.7 - 15.4 g/dL    HCT 31.1 (L) 35.8 - 46.3 %    .2 (H) 79.6 - 97.8 FL    MCH 34.8 (H) 26.1 - 32.9 PG    MCHC 32.5 31.4 - 35.0 g/dL    RDW 21.9 (H) 11.9 - 14.6 %    PLATELET 249 806 - 579 K/uL    MPV 12.7 (H) 9.4 - 12.3 FL    ABSOLUTE NRBC 0.04 0.0 - 0.2 K/uL    DF AUTOMATED      NEUTROPHILS 44 43 - 78 %    LYMPHOCYTES 42 13 - 44 %    MONOCYTES 12 4.0 - 12.0 %    EOSINOPHILS 1 0.5 - 7.8 %    BASOPHILS 0 0.0 - 2.0 %    IMMATURE GRANULOCYTES 1 0.0 - 5.0 %    ABS. NEUTROPHILS 3.1 1.7 - 8.2 K/UL    ABS. LYMPHOCYTES 3.0 0.5 - 4.6 K/UL    ABS. MONOCYTES 0.8 0.1 - 1.3 K/UL    ABS. EOSINOPHILS 0.1 0.0 - 0.8 K/UL    ABS. BASOPHILS 0.0 0.0 - 0.2 K/UL    ABS. IMM.  GRANS. 0.1 0.0 - 0.5 K/UL   HEMOGLOBIN A1C WITH EAG    Collection Time: 04/19/22 10:08 AM   Result Value Ref Range    Hemoglobin A1c 4.5 4.20 - 6.30 %    Est. average glucose Cannot be calculated mg/dL   METABOLIC PANEL, BASIC    Collection Time: 04/19/22 10:08 AM   Result Value Ref Range    Sodium 141 136 - 145 mmol/L    Potassium 3.9 3.5 - 5.1 mmol/L    Chloride 111 (H) 98 - 107 mmol/L    CO2 23 21 - 32 mmol/L    Anion gap 7 7 - 16 mmol/L    Glucose 80 65 - 100 mg/dL    BUN 33 (H) 8 - 23 MG/DL    Creatinine 1.17 (H) 0.6 - 1.0 MG/DL    GFR est AA 57 (L) >60 ml/min/1.73m2    GFR est non-AA 47 (L) >60 ml/min/1.73m2    Calcium 9.8 8.3 - 10.4 MG/DL   PROTHROMBIN TIME + INR    Collection Time: 04/19/22 10:08 AM   Result Value Ref Range    Prothrombin time 13.8 12.6 - 14.5 sec    INR 1.0     PTT    Collection Time: 04/19/22 10:08 AM   Result Value Ref Range    aPTT 28.8 24.1 - 35.1 SEC         Problem List:  )  Patient Active Problem List   Diagnosis Code    Mediastinal tumor D49.89    IBS (irritable bowel syndrome) K58.9    Bronchogenic cyst J98.4    Aplastic anemia (HCC) D61.9    CHF (congestive heart failure) ([facility]) I50.9    Myelodysplasia (myelodysplastic syndrome) ([facility]) D46.9    Hypothyroidism E03.9    CKD (chronic kidney disease) N18.9    Hyperlipidemia E78.5    Hypertension I10    Chronic cystitis N30.20    Chronic cough R05.3    MDS (myelodysplastic syndrome) ([facility]) D46.9    Closed left hip fracture ([facility]) S72.002A    CKD (chronic kidney disease) stage 3, GFR 30-59 ml/min ([facility]) N18.30    Pulmonary HTN ([facility]) I27.20    Chronic diastolic congestive heart failure (HCC) I50.32    Dizziness R42       Total Joint Surgery Pre-Assessment Recommendations:           Continuous saturation monitoring during hospitalization. O2 prn per respiratory protocol.    Albuterol every 6 hours as need during hospitalization. ;    Signed By: CHUCK Motta    April 19, 2022

## 2022-04-19 NOTE — PERIOP NOTES
The below lab results are within anesthesia limits. Results routed via [facility] to patient's PCP per surgeon's request.     Recent Results (from the past 12 hour(s))   EKG, 12 LEAD, INITIAL    Collection Time: 04/19/22  9:55 AM   Result Value Ref Range    Ventricular Rate 64 BPM    Atrial Rate 64 BPM    P-R Interval 186 ms    QRS Duration 90 ms    Q-T Interval 410 ms    QTC Calculation (Bezet) 422 ms    Calculated P Axis 69 degrees    Calculated R Axis 13 degrees    Calculated T Axis 65 degrees    Diagnosis       Sinus rhythm with Premature atrial complexes  Otherwise normal ECG  When compared with ECG of 13-FEB-2020 15:34,  No significant change was found     CBC WITH AUTOMATED DIFF    Collection Time: 04/19/22 10:08 AM   Result Value Ref Range    WBC 7.1 4.3 - 11.1 K/uL    RBC 2.90 (L) 4.05 - 5.2 M/uL    HGB 10.1 (L) 11.7 - 15.4 g/dL    HCT 31.1 (L) 35.8 - 46.3 %    .2 (H) 79.6 - 97.8 FL    MCH 34.8 (H) 26.1 - 32.9 PG    MCHC 32.5 31.4 - 35.0 g/dL    RDW 21.9 (H) 11.9 - 14.6 %    PLATELET 946 259 - 048 K/uL    MPV 12.7 (H) 9.4 - 12.3 FL    ABSOLUTE NRBC 0.04 0.0 - 0.2 K/uL    DF AUTOMATED      NEUTROPHILS 44 43 - 78 %    LYMPHOCYTES 42 13 - 44 %    MONOCYTES 12 4.0 - 12.0 %    EOSINOPHILS 1 0.5 - 7.8 %    BASOPHILS 0 0.0 - 2.0 %    IMMATURE GRANULOCYTES 1 0.0 - 5.0 %    ABS. NEUTROPHILS 3.1 1.7 - 8.2 K/UL    ABS. LYMPHOCYTES 3.0 0.5 - 4.6 K/UL    ABS. MONOCYTES 0.8 0.1 - 1.3 K/UL    ABS. EOSINOPHILS 0.1 0.0 - 0.8 K/UL    ABS. BASOPHILS 0.0 0.0 - 0.2 K/UL    ABS. IMM.  GRANS. 0.1 0.0 - 0.5 K/UL   HEMOGLOBIN A1C WITH EAG    Collection Time: 04/19/22 10:08 AM   Result Value Ref Range    Hemoglobin A1c 4.5 4.20 - 6.30 %    Est. average glucose Cannot be calculated mg/dL   METABOLIC PANEL, BASIC    Collection Time: 04/19/22 10:08 AM   Result Value Ref Range    Sodium 141 136 - 145 mmol/L    Potassium 3.9 3.5 - 5.1 mmol/L    Chloride 111 (H) 98 - 107 mmol/L    CO2 23 21 - 32 mmol/L    Anion gap 7 7 - 16 mmol/L Glucose 80 65 - 100 mg/dL    BUN 33 (H) 8 - 23 MG/DL    Creatinine 1.17 (H) 0.6 - 1.0 MG/DL    GFR est AA 57 (L) >60 ml/min/1.73m2    GFR est non-AA 47 (L) >60 ml/min/1.73m2    Calcium 9.8 8.3 - 10.4 MG/DL   PROTHROMBIN TIME + INR    Collection Time: 04/19/22 10:08 AM   Result Value Ref Range    Prothrombin time 13.8 12.6 - 14.5 sec    INR 1.0     PTT    Collection Time: 04/19/22 10:08 AM   Result Value Ref Range    aPTT 28.8 24.1 - 35.1 SEC

## 2022-04-19 NOTE — PROGRESS NOTES
04/19/22 1000   Oxygen Therapy   O2 Sat (%) 96 %   Pulse via Oximetry 92 beats per minute   O2 Device None (Room air)   Pre-Treatment   Breath Sounds Bilateral Diminished   Pre FEV1 (liters) 1.3 liters   % Predicted 70   Procedures   $$ Demo/Evaluation Yes   Delivery Source MDI     Initial respiratory Assessment completed with pt. Pt was interviewed and evaluated in Joint camp prior to surgery. Patient ID:  Liz Mcclendon  [id number]  82 y.o.  1935  Surgeon: Dr. Domingo Common  Date of Surgery: 5/12/2022  Procedure: Total Left Hip Arthroplasty  Primary Care Physician: Teresa Ledesma [phone]  Specialists:     Pt taught proper COUGH technique  DIAPHRAGMATIC BREATHING EXERCISE INSTRUCTIONS GIVEN    History of smoking:   DENIES                 Quit date:         Secondhand smoke:DENIES    Past procedures with Oxygen desaturation or delayed awakening:DELAYED AWAKENING    Past Medical History:   Diagnosis Date    Adverse effect of anesthesia     delayed awakening     Aplastic anemia ([facility] [facility] 2/10/2017    Asthma     PRN inhaler-last used 3/2022    Atherosclerosis of both carotid arteries     asymptomatic     Bronchogenic cyst 2/10/2017    --DR Mariah Fox    CHF (congestive heart failure) ([facility] 2/10/2017    Chronic cough 2/10/2017    Chronic cystitis 2/10/2017    --UROLOGIST - DR GALO    Chronic kidney disease, stage 3a ([facility]     Followed by Dr. Jostin Vargas and Dr. Mariella Sharma (dyspnea on exertion)     Ongoing since COVID diagnosis in January. Pt states improving, ProAir PRN.      Dyslipidemia     GERD (gastroesophageal reflux disease)     H/O echocardiogram 03/15/2022    EF >65%    History of COVID-19 01/2022    History of MRSA infection     History of pneumonia     WITH MRSA / EMPYEMA /SEPSIS/ DEHYDRATION / RICHAR - RESOLVED WAS IN REHAB FOR A WHILE    Hyperlipidemia 2/10/2017    Hypertension 2/10/2017    Hypothyroidism 2/10/2017    IBS (irritable bowel syndrome) 2/9/2017 --DIVERTICULOSIS - [facility]  - DR Josi Hernandez    Mediastinal tumor 2/9/2017    --S/P RESECTION  - SEEN CARDIOLOGIST - STRESS TEST - NORMAL 2012    Myelodysplasia (myelodysplastic syndrome) ([facility] 2/10/2017    --SEES HEMONC    Nonischemic cardiomyopathy ([facility] [facility]     hx-EF has normalized, followed by Dr. Claudette Garcia Pulmonary hypertension ([facility] [address]     Followed by Dr. Michael Samuels Recurrent UTI     Followed by Dr. Garret Kelley Seasonal allergic conjunctivitis     Takotsubo cardiomyopathy     History     CHRONIC COUGH, ASTHMA, HX OF RESP FAILURE  HX OF EMPYEMA, PUL HTN,PNA AFTER COVID 2/21/2022 7/28/2021  MILD SCARRING / ATELECTASIS ON CHEST X-RAY 7/28/2020  CURRENTLY COUGHING UP YELLOW SPUTUM  Respiratory history:DENIES SOB                                                                   Respiratory meds:  PT uses MDI PRN with PROAIR. MDI instructions given verbally & written along with spacer. Pt to use home MDI's morning of surgery & bring to [address]:             NO     PAST SLEEP STUDY:        YES                    HX OF FAYE:                                          DENIES  FAYE assessment:                                               SLEEPS ON SIDE       &      BACK          PHYSICAL EXAM   Body mass index is 20.47 kg/m².    Visit Vitals  BP (!) 153/66 (BP 1 Location: Right upper arm)   Pulse 71   Temp 97.4 °F (36.3 °C)   Resp 18   Ht 5' 5\" (1.651 m)   Wt 55.8 kg (123 lb)   SpO2 (P) 96%   BMI 20.47 kg/m²     Neck circumference:   36.5   cm    Loud snoring:                                                   DENIES  Witnessed apnea or wakening gasping or choking:        DENIES      Awakens with headaches:                                               DENIES  Morning or daytime tiredness/ sleepiness:                               TIRED  Dry mouth or sore throat in morning:            YES                                             Berrios stage:  4 SACS score:9  Stop Bang   STOP-BANG  Does the patient snore loudly (louder than talking or loud enough to be heard through closed doors)?: No  Does the patient often feel tired, fatigued, or sleepy during the daytime, even after a \"good\" night's sleep?: Yes  Has anyone ever observed the patient stop breathing during their sleep? : No  Does the patient have or are they being treated for high blood pressure?: Yes  Is the patient's BMI greater than 35?: No  Is your neck circumference greater than 17 inches (Male) or 16 inches (Female)?: No  Is the patient older than 48?: Yes  Is the patient male?: No  FAYE Score: 3  Has the patient been referred to Sleep Medicine?: No  Has the patient previously been diagnosed with Obstructive Sleep Apnea?: No                            CS HS  RESPIRATORY ASSESSMENT Q SHIFT   O2 PRN    ALBUTEROL  NEBULIZER Q6 PRN WHEEZING                                                Referrals:    Pt. Phone Number:

## 2022-04-19 NOTE — PROGRESS NOTES
Juanis Skelton  : [birth year](63 y.o.) Joint [facility]als at [address], [place] [address].  [phone]       Physical Therapy Prehab Plan of Treatment and Evaluation Summary:2022    ICD-10: Treatment Diagnosis:   · Pain in left hip (M25.552)  · Stiffness of Left Hip, Not elsewhere classified (M25.652)  · Difficulty in walking, Not elsewhere classified (R26.2)  Precautions/Allergies:   Bactrim [sulfamethoprim], Levaquin [levofloxacin], and Tramadol  MEDICAL/REFERRING DIAGNOSIS:  Pain in left hip [M25.552]  REFERRING PHYSICIAN: Liliana Abdalla,*  DATE OF SURGERY: 22    Assessment:   Comments:  Patient presents prior to L CAITLYN. She had a L CAITLYN with Dr. Kristine Conway and reports it has hurt since the surgery. She went to [facility] after her ORIF but is open to going home after the CAITLYN. She already functions with a rollator and has neighbors and another daughter who can check on her while her grandkids are at school and her other daughter is at work. Patient prefers to use her rollator after surgery but does have a RW. PROBLEM LIST (Impacting functional limitations):  Ms. Ignacia Zaman presents with the following left lower extremity(s) problems:  1. Gait  2. Strength  3. Range of Motion  4. Balance  5. Home Exercise Program  6. Pain   INTERVENTIONS PLANNED:  1. Home Exercise Program  2. Educational Discussion      TREATMENT PLAN: Effective Dates: 2022 TO 2022. Frequency/Duration: Patient to continue to perform home exercise program at least twice per day up until her surgery. GOALS: (Goals have been discussed and agreed upon with patient.)  Discharge Goals: Time Frame: 1 Day  1. Patient will demonstrate independence with a home exercise program designed to increase strength, range of motion, balance, coordination, functional technique and pain control to minimize functional deficits and optimize patient for total joint replacement.   Rehabilitation Potential For Stated Goals: Good  Regarding Thelda Burke Bruton's therapy, I certify that the treatment plan above will be carried out by a therapist or under their direction. Thank you for this referral,  Mitul Diego, PT               HISTORY:   Present Symptoms:  Pain Intensity 1: 6   History of Present Injury/Illness (Reason for Referral):  Medical/Referring Diagnosis: Pain in left hip [M25.552]   Past Medical History/Comorbidities:   Ms. Jordy Gann  has a past medical history of Adverse effect of anesthesia, Aplastic anemia ([address] (2/10/2017), Asthma, Atherosclerosis of both carotid arteries, Bronchogenic cyst (2/10/2017), CHF (congestive heart failure) ([address] (2/10/2017), Chronic cough (2/10/2017), Chronic cystitis (2/10/2017), Chronic kidney disease, stage 3a ([address] [place]G (dyspnea on exertion), Dyslipidemia, GERD (gastroesophageal reflux disease), H/O echocardiogram (03/15/2022), History of COVID-19 (01/2022), History of MRSA infection, History of pneumonia, Hyperlipidemia (2/10/2017), Hypertension (2/10/2017), Hypothyroidism (2/10/2017), IBS (irritable bowel syndrome) (2/9/2017), Mediastinal tumor (2/9/2017), Myelodysplasia (myelodysplastic syndrome) ([address] (2/10/2017), Nonischemic cardiomyopathy ([address] Osteoarthritis, Pulmonary hypertension ([address] Recurrent UTI, Seasonal allergic conjunctivitis, and Takotsubo cardiomyopathy. Ms. Jordy Gann  has a past surgical history that includes hx cholecystectomy; hx tonsillectomy; hx hysterectomy; hx other surgical (Left); hx hernia repair; hx cyst removal; hx hip fracture tx (Left, 2020); and hx gi. Social History/Living Environment:   Home Environment: Private residence  # Steps to Enter: 4  Rails to Enter: Yes  Hand Rails : Left  One/Two Story Residence: One story  Living Alone: No  Support Systems: Child(shaquille); Other Family Member(s)  Patient Expects to be Discharged to[de-identified] Home  Current DME Used/Available at Home: [employer] Christopher, rollator; flower [place]; [place], straight; Crutches;  Shower chair; Commode, bedside  Tub or Shower Type: Shower    Work/Activity:  retired  Dominant Side:  RIGHT  Current Medications:  See Pre-assessment nursing note   Number of Personal Factors/Comorbidities that affect the Plan of Care: 0: LOW COMPLEXITY   EXAMINATION:   ADLs (Current Functional Status):   Ambulation:  [] Independent  [x] Walk Indoors Only  [] Walk Outdoors  [x] Use Assistive Device  [] Use Wheelchair Only Dressing:  [x] Independent  Requires Assistance from Someone for:  [] Sock/Shoes  [] Pants  [] Everything   Bathing/Showering:   [x] Independent  [] Requires Assistance from Someone  [] Sponge Bath Only Household Activities:  [] Routine house and yard work  [x] Light Housework Only  [] None   Observation/Orthostatic Postural Assessment:       ROM/Flexibility:   AROM: Generally decreased, functional                           Strength:   Strength: Generally decreased, functional       LLE Strength  L Hip Flexion: 3+  L Knee Flexion: 4-  L Knee Extension: 4-          Functional Mobility:    Coordination: Within functional limits    Stand to Sit: Modified independent  Sit to Stand: Modified independent  Distance (ft): 200 Feet (ft)  Ambulation - Level of Assistance: Modified independent  Assistive Device: Walker, rollator  Base of Support: Center of gravity altered  Speed/Fidelia: Pace decreased (<100 feet/min)  Stance: Left decreased  Gait Abnormalities: Antalgic          Balance:    Sitting: Intact  Standing: With support   Body Structures Involved:  1. Joints  2. Muscles Body Functions Affected:  1. Movement Related Activities and Participation Affected:  1. Mobility   Number of elements that affect the Plan of Care: 4+: HIGH COMPLEXITY   CLINICAL PRESENTATION:   Presentation: Stable and uncomplicated: LOW COMPLEXITY   CLINICAL DECISION MAKING:   Tool Used:  Hip dysfunction and Osteoarthritis Outcome Score for Joint Replacement (HOOS, JR)  Score:  Initial: 15 (Interval: 43.335) 4/19/2022 Most Recent: TBD Interpretation of Score: The HOOS, JR contains 6 items from the original HOOS survey. Items are coded from 0 to 4, none to extreme respectively. Leticia Zeng is scored by summing the raw response (range 0-24) and then converting it to an interval score using the table provided below. The interval score ranges from 0 to 100 where 0 represents total hip disability and 100 represents perfect hip health. Medical Necessity:   · Ms. Padmini Goldberg is expected to optimize her lower extremity strength and ROM in preparation for joint replacement surgery. Reason for Services/Other Comments:  · Achieve baseline assesment of musculoskeletal system, functional mobility and home environment. , educate in PT HEP in preparation for surgery, educate in hospital plan of care. Use of outcome tool(s) and clinical judgement create a POC that gives a: Clear prediction of patient's progress: LOW COMPLEXITY   TREATMENT:   Treatment/Session Assessment:  Patient was instructed in PT- HEP to increase strength and ROM in LEs. Answered all questions. · Post session pain:  6/10  · Compliance with Program/Exercises: Will assess as treatment progresses.   Total Treatment Duration:  PT Patient Time In/Time Out  Time In: 1015  Time Out: [address] PT

## 2022-05-31 ENCOUNTER — HOSPITAL ENCOUNTER (EMERGENCY)
Age: 87
Discharge: LEFT BEFORE DISCHARGE | End: 2022-05-31

## 2022-05-31 VITALS
WEIGHT: 123 LBS | RESPIRATION RATE: 16 BRPM | DIASTOLIC BLOOD PRESSURE: 63 MMHG | HEART RATE: 76 BPM | OXYGEN SATURATION: 98 % | HEIGHT: 66 IN | TEMPERATURE: 98.6 F | BODY MASS INDEX: 19.77 KG/M2 | SYSTOLIC BLOOD PRESSURE: 138 MMHG

## 2022-05-31 ASSESSMENT — PAIN - FUNCTIONAL ASSESSMENT: PAIN_FUNCTIONAL_ASSESSMENT: 0-10

## 2022-05-31 ASSESSMENT — PAIN SCALES - GENERAL: PAINLEVEL_OUTOF10: 0

## 2022-05-31 NOTE — ED TRIAGE NOTES
Pt presents to ED c/o dog scratch abrasion to left lower leg. Pt concerned for edema. Hx of MRSA. Denies fever. Masked.

## 2022-06-06 ENCOUNTER — APPOINTMENT (RX ONLY)
Dept: URBAN - METROPOLITAN AREA CLINIC 329 | Facility: CLINIC | Age: 87
Setting detail: DERMATOLOGY
End: 2022-06-06

## 2022-06-06 DIAGNOSIS — L57.0 ACTINIC KERATOSIS: ICD-10-CM

## 2022-06-06 DIAGNOSIS — L81.4 OTHER MELANIN HYPERPIGMENTATION: ICD-10-CM

## 2022-06-06 DIAGNOSIS — D22 MELANOCYTIC NEVI: ICD-10-CM

## 2022-06-06 DIAGNOSIS — Z85.828 PERSONAL HISTORY OF OTHER MALIGNANT NEOPLASM OF SKIN: ICD-10-CM

## 2022-06-06 PROBLEM — D48.5 NEOPLASM OF UNCERTAIN BEHAVIOR OF SKIN: Status: ACTIVE | Noted: 2022-06-06

## 2022-06-06 PROBLEM — D22.61 MELANOCYTIC NEVI OF RIGHT UPPER LIMB, INCLUDING SHOULDER: Status: ACTIVE | Noted: 2022-06-06

## 2022-06-06 PROCEDURE — ? BIOPSY BY SHAVE METHOD

## 2022-06-06 PROCEDURE — 99213 OFFICE O/P EST LOW 20 MIN: CPT | Mod: 25

## 2022-06-06 PROCEDURE — ? FULL BODY SKIN EXAM - DECLINED

## 2022-06-06 PROCEDURE — ? ADDITIONAL NOTES

## 2022-06-06 PROCEDURE — 17000 DESTRUCT PREMALG LESION: CPT | Mod: 59

## 2022-06-06 PROCEDURE — ? SUNSCREEN RECOMMENDATIONS

## 2022-06-06 PROCEDURE — ? LIQUID NITROGEN

## 2022-06-06 PROCEDURE — 11102 TANGNTL BX SKIN SINGLE LES: CPT

## 2022-06-06 PROCEDURE — ? COUNSELING

## 2022-06-06 ASSESSMENT — LOCATION DETAILED DESCRIPTION DERM
LOCATION DETAILED: RIGHT PROXIMAL DORSAL FOREARM
LOCATION DETAILED: LEFT DISTAL DORSAL FOREARM
LOCATION DETAILED: LEFT PROXIMAL DORSAL FOREARM

## 2022-06-06 ASSESSMENT — LOCATION ZONE DERM: LOCATION ZONE: ARM

## 2022-06-06 ASSESSMENT — LOCATION SIMPLE DESCRIPTION DERM
LOCATION SIMPLE: LEFT FOREARM
LOCATION SIMPLE: RIGHT FOREARM

## 2022-06-06 NOTE — PROCEDURE: MIPS QUALITY
Quality 130: Documentation Of Current Medications In The Medical Record: Current Medications Documented
Quality 110: Preventive Care And Screening: Influenza Immunization: Influenza Immunization previously received during influenza season
Quality 47: Advance Care Plan: Advance Care Planning discussed and documented; advance care plan or surrogate decision maker documented in the medical record.
Quality 111:Pneumonia Vaccination Status For Older Adults: Pneumococcal vaccine administered on or after patient’s 60th birthday and before the end of the measurement period
Detail Level: Detailed

## 2022-07-18 ENCOUNTER — HOSPITAL ENCOUNTER (EMERGENCY)
Age: 87
Discharge: HOME | End: 2022-07-19
Attending: EMERGENCY MEDICINE
Payer: MEDICARE

## 2022-07-18 ENCOUNTER — APPOINTMENT (OUTPATIENT)
Dept: CT IMAGING | Age: 87
End: 2022-07-18
Payer: MEDICARE

## 2022-07-18 DIAGNOSIS — I10 ESSENTIAL HYPERTENSION: ICD-10-CM

## 2022-07-18 DIAGNOSIS — R55 NEAR SYNCOPE: ICD-10-CM

## 2022-07-18 DIAGNOSIS — E86.0 DEHYDRATION, MILD: ICD-10-CM
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
## 2022-07-18 LAB
CHP ED QC CHECK: YES
GLUCOSE BLD STRIP.AUTO-MCNC: 111 MG/DL (ref 65–100)
GLUCOSE BLD-MCNC: 111 MG/DL
SERVICE CMNT-IMP: ABNORMAL

## 2022-07-18 PROCEDURE — 99284 EMERGENCY DEPT VISIT MOD MDM: CPT

## 2022-07-18 PROCEDURE — 80048 BASIC METABOLIC PNL TOTAL CA: CPT

## 2022-07-18 PROCEDURE — 6370000000 HC RX 637 (ALT 250 FOR IP): Performed by: EMERGENCY MEDICINE

## 2022-07-18 PROCEDURE — 85025 COMPLETE CBC W/AUTO DIFF WBC: CPT

## 2022-07-18 PROCEDURE — 70450 CT HEAD/BRAIN W/O DYE: CPT

## 2022-07-18 PROCEDURE — 82962 GLUCOSE BLOOD TEST: CPT

## 2022-07-18 PROCEDURE — 96374 THER/PROPH/DIAG INJ IV PUSH: CPT

## 2022-07-18 RX ORDER — LOSARTAN POTASSIUM 50 MG/1
50 TABLET ORAL
Status: DISCONTINUED | OUTPATIENT
Start: 2022-07-19 | End: 2022-07-18

## 2022-07-18 RX ORDER — LOSARTAN POTASSIUM 25 MG/1
25 TABLET ORAL DAILY
Qty: 30 TABLET | Refills: 0 | Status: SHIPPED | OUTPATIENT
Start: 2022-07-18

## 2022-07-18 RX ORDER — 0.9 % SODIUM CHLORIDE 0.9 %
500 INTRAVENOUS SOLUTION INTRAVENOUS
Status: DISCONTINUED | OUTPATIENT
Start: 2022-07-18 | End: 2022-07-19 | Stop reason: HOSPADM

## 2022-07-18 RX ORDER — LOSARTAN POTASSIUM 50 MG/1
25 TABLET ORAL
Status: COMPLETED | OUTPATIENT
Start: 2022-07-19 | End: 2022-07-19

## 2022-07-18 RX ORDER — ACETAMINOPHEN 325 MG/1
650 TABLET ORAL
Status: COMPLETED | OUTPATIENT
Start: 2022-07-18 | End: 2022-07-18

## 2022-07-18 RX ORDER — ASPIRIN 81 MG/1
81 TABLET ORAL DAILY
Qty: 30 TABLET | Refills: 0 | Status: SHIPPED | OUTPATIENT
Start: 2022-07-18

## 2022-07-18 RX ADMIN — ACETAMINOPHEN 650 MG: 325 TABLET ORAL at 23:23

## 2022-07-18 ASSESSMENT — PAIN - FUNCTIONAL ASSESSMENT: PAIN_FUNCTIONAL_ASSESSMENT: 0-10

## 2022-07-19 VITALS
SYSTOLIC BLOOD PRESSURE: 190 MMHG | TEMPERATURE: 97.7 F | HEIGHT: 66 IN | BODY MASS INDEX: 19.93 KG/M2 | OXYGEN SATURATION: 95 % | HEART RATE: 64 BPM | RESPIRATION RATE: 17 BRPM | WEIGHT: 124 LBS | DIASTOLIC BLOOD PRESSURE: 81 MMHG

## 2022-07-19 LAB
ANION GAP SERPL CALC-SCNC: 12 MMOL/L (ref 7–16)
APPEARANCE UR: CLEAR
BACTERIA URNS QL MICRO: ABNORMAL /HPF
BASOPHILS # BLD: 0 K/UL (ref 0–0.2)
BASOPHILS NFR BLD: 0 % (ref 0–2)
BILIRUB UR QL: NEGATIVE
BUN SERPL-MCNC: 28 MG/DL (ref 7–18)
CALCIUM SERPL-MCNC: 9.6 MG/DL (ref 8.3–10.4)
CASTS URNS QL MICRO: ABNORMAL /LPF
CHLORIDE SERPL-SCNC: 110 MMOL/L (ref 98–107)
CO2 SERPL-SCNC: 21 MMOL/L (ref 21–32)
COLOR UR: YELLOW
CREAT SERPL-MCNC: 1.14 MG/DL (ref 0.6–1)
CRYSTALS URNS QL MICRO: 0 /LPF
DIFFERENTIAL METHOD BLD: ABNORMAL
EOSINOPHIL # BLD: 0.1 K/UL (ref 0–0.8)
EOSINOPHIL NFR BLD: 1 % (ref 0.5–7.8)
EPI CELLS #/AREA URNS HPF: ABNORMAL /HPF
ERYTHROCYTE [DISTWIDTH] IN BLOOD BY AUTOMATED COUNT: 22.6 % (ref 11.9–14.6)
GLUCOSE SERPL-MCNC: 103 MG/DL (ref 65–100)
GLUCOSE UR STRIP.AUTO-MCNC: NEGATIVE MG/DL
HCT VFR BLD AUTO: 29.8 % (ref 35.8–46.3)
HGB BLD-MCNC: 9.9 G/DL (ref 11.7–15.4)
HGB UR QL STRIP: ABNORMAL
IMM GRANULOCYTES # BLD AUTO: 0.4 K/UL (ref 0–0.5)
IMM GRANULOCYTES NFR BLD AUTO: 5 % (ref 0–5)
KETONES UR QL STRIP.AUTO: NEGATIVE MG/DL
LEUKOCYTE ESTERASE UR QL STRIP.AUTO: NEGATIVE
LYMPHOCYTES # BLD: 3.7 K/UL (ref 0.5–4.6)
LYMPHOCYTES NFR BLD: 42 % (ref 13–44)
MCH RBC QN AUTO: 33.6 PG (ref 26.1–32.9)
MCHC RBC AUTO-ENTMCNC: 33.2 G/DL (ref 31.4–35)
MCV RBC AUTO: 101 FL (ref 79.6–97.8)
MONOCYTES # BLD: 0.8 K/UL (ref 0.1–1.3)
MONOCYTES NFR BLD: 9 % (ref 4–12)
MUCOUS THREADS URNS QL MICRO: ABNORMAL /LPF
NEUTS SEG # BLD: 3.7 K/UL (ref 1.7–8.2)
NEUTS SEG NFR BLD: 43 % (ref 43–78)
NITRITE UR QL STRIP.AUTO: NEGATIVE
NRBC # BLD: 0.05 K/UL (ref 0–0.2)
OTHER OBSERVATIONS: ABNORMAL
PH UR STRIP: 5.5 [PH] (ref 5–9)
PLATELET # BLD AUTO: 283 K/UL (ref 150–450)
PLATELET COMMENT: ADEQUATE
PMV BLD AUTO: 11.7 FL (ref 9.4–12.3)
POTASSIUM SERPL-SCNC: 4 MMOL/L (ref 3.5–5.1)
PROT UR STRIP-MCNC: 30 MG/DL
RBC # BLD AUTO: 2.95 M/UL (ref 4.05–5.2)
RBC #/AREA URNS HPF: ABNORMAL /HPF
RBC MORPH BLD: ABNORMAL
SODIUM SERPL-SCNC: 143 MMOL/L (ref 136–145)
SP GR UR REFRACTOMETRY: 1.02 (ref 1–1.02)
UROBILINOGEN UR QL STRIP.AUTO: 0.2 EU/DL (ref 0.2–1)
WBC # BLD AUTO: 8.7 K/UL (ref 4.3–11.1)
WBC MORPH BLD: ABNORMAL
WBC URNS QL MICRO: ABNORMAL /HPF

## 2022-07-19 PROCEDURE — 6370000000 HC RX 637 (ALT 250 FOR IP): Performed by: EMERGENCY MEDICINE

## 2022-07-19 PROCEDURE — 87086 URINE CULTURE/COLONY COUNT: CPT

## 2022-07-19 PROCEDURE — 81015 MICROSCOPIC EXAM OF URINE: CPT

## 2022-07-19 PROCEDURE — 81001 URINALYSIS AUTO W/SCOPE: CPT

## 2022-07-19 PROCEDURE — 2500000003 HC RX 250 WO HCPCS: Performed by: EMERGENCY MEDICINE

## 2022-07-19 RX ORDER — LABETALOL HYDROCHLORIDE 5 MG/ML
10 INJECTION, SOLUTION INTRAVENOUS
Status: COMPLETED | OUTPATIENT
Start: 2022-07-19 | End: 2022-07-19

## 2022-07-19 RX ORDER — KETOROLAC TROMETHAMINE 15 MG/ML
15 INJECTION, SOLUTION INTRAMUSCULAR; INTRAVENOUS
Status: DISCONTINUED | OUTPATIENT
Start: 2022-07-19 | End: 2022-07-19

## 2022-07-19 RX ADMIN — LOSARTAN POTASSIUM 25 MG: 50 TABLET, FILM COATED ORAL at 00:08

## 2022-07-19 RX ADMIN — LABETALOL HYDROCHLORIDE 10 MG: 5 INJECTION INTRAVENOUS at 00:48

## 2022-07-19 ASSESSMENT — PAIN SCALES - GENERAL
PAINLEVEL_OUTOF10: 0
PAINLEVEL_OUTOF10: 0

## 2022-07-19 ASSESSMENT — PAIN - FUNCTIONAL ASSESSMENT: PAIN_FUNCTIONAL_ASSESSMENT: 0-10

## 2022-07-19 NOTE — DISCHARGE INSTRUCTIONS
Talk to your family doctor about giving you refills of your blood pressure medication and baby aspirin so that you can continue taking these medicines at your new facility. Try to increase amount of water you drink daily.

## 2022-07-19 NOTE — ED NOTES
I have reviewed discharge instructions with the patient. The patient verbalized understanding. Patient left ED via Discharge Method: wheelchair to SNF with grand daughter    Opportunity for questions and clarification provided. Patient given 2 scripts. To continue your aftercare when you leave the hospital, you may receive an automated call from our care team to check in on how you are doing. This is a free service and part of our promise to provide the best care and service to meet your aftercare needs.  If you have questions, or wish to unsubscribe from this service please call [phone]. Thank you for Choosing our [address] [place] [address] Emergency Department.       Stephanie Zaldivar RN  07/19/22 8108

## 2022-07-19 NOTE — ED PROVIDER NOTES
[facility] Emergency Department Provider Note                   PCP:                NOT ON FILE               Age: 82 y.o. Sex: female       ICD-10-CM    1. Fall on same level, initial encounter  W18.30XA       2. Near syncope  R55       3. Essential hypertension  I10       4. Dehydration, mild  E86.0           DISPOSITION Decision To Discharge 07/19/2022 12:56:16 AM       MDM  Number of Diagnoses or Management Options  Dehydration, mild  Essential hypertension  Fall on same level, initial encounter  Near syncope  Diagnosis management comments: intracranial hemorrhage,  subdural hematoma, subarachnoid hemorrhage,    tension headache, migraine headache, brain tumor, cluster headache, sinusitis    electrolyte abnormality, renal failure, malignant hypertension, UTI    seizure, pseudoseizures, status epilepticus, malingering    peripheral neuropathy, metabolic disorder,          Amount and/or Complexity of Data Reviewed  Clinical lab tests: reviewed and ordered  Tests in the radiology section of CPT®: reviewed and ordered  Tests in the medicine section of CPT®: ordered and reviewed  Review and summarize past medical records: yes  Independent visualization of images, tracings, or specimens: yes         Orders Placed This Encounter   Procedures    Culture, Urine    CT HEAD WO CONTRAST    Urinalysis w rflx microscopic    CBC with Auto Differential    Basic Metabolic Panel    Urinalysis, Micro    Orthostatic blood pressure and pulse    POCT Glucose    POCT Glucose    EKG 12 Lead        Abi Barragan is a 82 y.o. female who presents to the Emergency Department with chief complaint of    Chief Complaint   Patient presents with    Fall      82-year-old female presenting to the emergency department today after a fall from standing. The patient states that she had gotten up to go get her pajamas walks a few steps across the room and bent down to get the pajamas when she got lightheaded.   The patient fell onto her bottom but hit her head against the cabinet. She did not lose consciousness. Patient said that she was dizzy at the time of the event. She notes that she is in a new facility 1 month and has not been receiving all of her blood pressure medications. The patient is not on any blood thinners. She denies any pain in the arms or legs. She denies any pain in the chest abdomen or pelvis. All other systems reviewed and are negative. Review of Systems   Neurological:  Positive for dizziness. All other systems reviewed and are negative. Past Medical History:   Diagnosis Date    Adverse effect of anesthesia     delayed awakening     Aplastic anemia ([facility] 2/10/2017    Asthma     PRN inhaler-last used 3/2022    Atherosclerosis of both carotid arteries     asymptomatic     Bronchogenic cyst 2/10/2017    --DR DWYER    CHF (congestive heart failure) ([facility] 2/10/2017    Chronic cough 2/10/2017    Chronic cystitis 2/10/2017    --UROLOGIST - DR GREWAL    Chronic kidney disease, stage 3a ([facility]     Followed by Dr. Carmen Morgan and Dr. Marie Mehta (dyspnea on exertion)     Ongoing since COVID diagnosis in January. Pt states improving, ProAir PRN.      Dyslipidemia     GERD (gastroesophageal reflux disease)     H/O echocardiogram 03/15/2022    EF >65%    History of COVID-19 01/2022    History of MRSA infection     History of pneumonia     WITH MRSA / EMPYEMA /SEPSIS/ DEHYDRATION / GI - RESOLVED WAS IN REHAB FOR A WHILE    Hyperlipidemia 2/10/2017    Hypertension 2/10/2017    Hypothyroidism 2/10/2017    IBS (irritable bowel syndrome) 2/9/2017    --DIVERTICULOSIS - [facility]  - DR Shu Wan    Mediastinal tumor 2/9/2017    --S/P RESECTION  - SEEN CARDIOLOGIST - STRESS TEST - NORMAL 2012    Myelodysplasia (myelodysplastic syndrome) ([facility] 2/10/2017    --SEES HEMONC    Nonischemic cardiomyopathy ([facility]     hx-EF has normalized, followed by Dr. Trinidad Jose     Osteoarthritis     Pulmonary hypertension ([facility]     Followed by  Leryo     Recurrent UTI     Followed by Dr. Corina Barton     Seasonal allergic conjunctivitis     Takotsubo cardiomyopathy     History         Past Surgical History:   Procedure Laterality Date    CHOLECYSTECTOMY      CYST REMOVAL      MEDIASTINAL TUMOR  - BENIGN    GI      bleeding ulcer repair     HERNIA REPAIR      HIP FRACTURE SURGERY Left 2020    HYSTERECTOMY      OTHER SURGICAL HISTORY Left     STAPEDECTOMY     TONSILLECTOMY          Family History   Problem Relation Age of Onset    Diabetes Other         SON; DAUGHTER    Diabetes Brother     Cancer Mother         BREAST    Heart Disease Father         Social History     Socioeconomic History    Marital status:    Tobacco Use    Smoking status: Never    Smokeless tobacco: Never   Substance and Sexual Activity    Alcohol use: No    Drug use: Never        Allergies: Tramadol, Levofloxacin, and Sulfamethoxazole-trimethoprim    Current Discharge Medication List        CONTINUE these medications which have NOT CHANGED    Details   acetaminophen (TYLENOL) 500 MG tablet Take by mouth every 6 hours as needed      atorvastatin (LIPITOR) 80 MG tablet Take 80 mg by mouth daily      Cetirizine HCl 10 MG TBDP Take by mouth      vitamin D 25 MCG (1000 UT) CAPS Take by mouth daily      Darbepoetin Kamran-Polysorbate (ARANESP, ALBUMIN FREE,) 10 MCG/0.4ML SOSY injection Infuse intravenously once      estradiol (ESTRACE) 0.1 MG/GM vaginal cream Place 2 g vaginally daily      HYDROcodone-acetaminophen (NORCO) 5-325 MG per tablet Take 1 tablet by mouth.      levothyroxine (SYNTHROID) 25 MCG tablet Take by mouth every morning (before breakfast)      montelukast (SINGULAIR) 10 MG tablet Take 10 mg by mouth daily      nitroGLYCERIN (NITROSTAT) 0.4 MG SL tablet Place 0.4 mg under the tongue      omeprazole (PRILOSEC) 40 MG delayed release capsule Take 40 mg by mouth daily              Vitals signs and nursing note reviewed.    Patient Vitals for the past 4 hrs:   Temp Pulse Resp BP SpO2   07/19/22 0034 -- 64 16 (!) 192/73 94 %   07/18/22 2316 -- 92 23 (!) 185/73 96 %   07/18/22 2315 -- 91 -- (!) 185/73 --   07/18/22 2313 -- 73 -- (!) 180/67 --   07/18/22 2311 -- 67 -- (!) 196/73 --   07/18/22 2247 97.7 °F (36.5 °C) 73 16 (!) 140/119 97 %          Physical Exam     GENERAL:The patient has Body mass index is 20.01 kg/m². Well-hydrated. VITAL SIGNS: Heart rate, blood pressure, respiratory rate reviewed as recorded in  nurse's notes  HEAD: Small nickel size scalp hematoma on the right occipital parietal region. No scalp lacerations or evidence of skull fracture appreciated. EYES: Pupils reactive. Extraocular motion intact. No conjunctival redness or drainage. EARS: No external masses or lesions. NOSE: No nasal drainage or epistaxis. MOUTH/THROAT: Pharynx clear; airway patent. NECK: Supple, no meningeal signs. Trachea midline. No masses or thyromegaly. Mild tenderness palpation along the right sternocleidomastoid. And levator scapula muscles. No tenderness to palpation or step-off deformities in the cervical spine noted. LUNGS: Breath sounds clear and equal bilaterally no accessory muscle use. CHEST: No deformity  CARDIOVASCULAR: Regular rate and rhythm  ABDOMEN: Soft without tenderness. No palpable masses or organomegaly. No  peritoneal signs. No rigidity. EXTREMITIES: No clubbing or cyanosis. No joint swelling. Normal muscle tone. No  restricted range of motion appreciated. NEUROLOGIC: Sensation is grossly intact. Cranial nerve exam reveals face is  symmetrical, tongue is midline speech is clear. SKIN: No rash or petechiae. Good skin turgor palpated. PSYCHIATRIC: Alert and oriented. Appropriate behavior and judgment. EKG 12 Lead    Date/Time: 7/18/2022 11:27 PM  Performed by: Reginaldo Loera DO  Authorized by: Reginaldo Loera DO     ECG reviewed by ED Physician in the absence of a cardiologist: yes    Comments:      Sinus rhythm rate of 65 bpm with a PAC present.   Narrow QRS with her hypertension. I encouraged her to keep a log of her blood pressures and follow-up with her family doctor this week. She is going to get started back on her losartan which she has not been taking at the facility and hopefully this will improve her blood pressure control. [SCARLET]      ED Course User Index  [KH] Kathy Wagner DO        Voice dictation software was used during the making of this note. This software is not perfect and grammatical and other typographical errors may be present. This note has not been completely proofread for errors.        Kathy Wagner DO  07/19/22 [address], DO  07/19/22 0116

## 2022-07-19 NOTE — ED TRIAGE NOTES
PT arrives to ED via EMS s/p fall. Pt was walking across room and felt dizzy and fell backwards. MD at bedside.

## 2022-07-21 LAB
BACTERIA SPEC CULT: NORMAL
SERVICE CMNT-IMP: NORMAL

## 2022-08-08 ENCOUNTER — OFFICE VISIT (OUTPATIENT)
Dept: ORTHOPEDIC SURGERY | Age: 87
End: 2022-08-08
Payer: MEDICARE

## 2022-08-08 DIAGNOSIS — M70.72 BURSITIS OF LEFT HIP, UNSPECIFIED BURSA: Primary | ICD-10-CM

## 2022-08-08 PROCEDURE — G8428 CUR MEDS NOT DOCUMENT: HCPCS | Performed by: ORTHOPAEDIC SURGERY

## 2022-08-08 PROCEDURE — G8420 CALC BMI NORM PARAMETERS: HCPCS | Performed by: ORTHOPAEDIC SURGERY

## 2022-08-08 PROCEDURE — 1036F TOBACCO NON-USER: CPT | Performed by: ORTHOPAEDIC SURGERY

## 2022-08-08 PROCEDURE — 1123F ACP DISCUSS/DSCN MKR DOCD: CPT | Performed by: ORTHOPAEDIC SURGERY

## 2022-08-08 PROCEDURE — 99213 OFFICE O/P EST LOW 20 MIN: CPT | Performed by: ORTHOPAEDIC SURGERY

## 2022-08-08 PROCEDURE — G8400 PT W/DXA NO RESULTS DOC: HCPCS | Performed by: ORTHOPAEDIC SURGERY

## 2022-08-08 PROCEDURE — 1090F PRES/ABSN URINE INCON ASSESS: CPT | Performed by: ORTHOPAEDIC SURGERY

## 2022-08-08 NOTE — PROGRESS NOTES
Name: Corrina Spain  YOB: 1935  Gender: female  MRN: [id number]        This patient comes in in follow-up for her left hip pain. She does have a history of a femoral neck fracture with a percutaneous pinning through our fracture service. It was my opinion that she probably was having more bursitis symptoms and her real problem with her hip fracture and I did provide her with a cortisone injection previously. She states that that in combination with her moved to an assisted living facility has really helped her. She denies any pain in the hip or groin area to speak of now although she does ambulate with use of a Rollator walker. Her biggest concern is her left shoulder and upper extremity. She had an admission to [facility] recently with left upper extremity pain and had a extensive work-up over a 4-day stay. Her heart checked out okay but it was felt that she had gout in her shoulder and elbow. She is wearing a sling. I did advise her that if she like to see someone for her shoulder would be happy to make arrangements one of my partners. She would like to speak with her primary care provider first get some advice there and then make arrangements. In regards to her left hip we discussed the appropriate use of cortisone and its frequency. She will continue to use her cane/walker. If she has more pain or issue she will let me know otherwise we will see her back as needed.     Isauro White MD

## 2022-10-03 ENCOUNTER — APPOINTMENT (RX ONLY)
Dept: URBAN - METROPOLITAN AREA CLINIC 329 | Facility: CLINIC | Age: 87
Setting detail: DERMATOLOGY
End: 2022-10-03

## 2022-10-03 DIAGNOSIS — L20.89 OTHER ATOPIC DERMATITIS: ICD-10-CM | Status: INADEQUATELY CONTROLLED

## 2022-10-03 PROBLEM — L20.84 INTRINSIC (ALLERGIC) ECZEMA: Status: ACTIVE | Noted: 2022-10-03

## 2022-10-03 PROCEDURE — 99214 OFFICE O/P EST MOD 30 MIN: CPT

## 2022-10-03 PROCEDURE — ? COUNSELING

## 2022-10-03 PROCEDURE — ? PRESCRIPTION MEDICATION MANAGEMENT

## 2022-10-03 PROCEDURE — ? PRESCRIPTION

## 2022-10-03 RX ORDER — TRIAMCINOLONE ACETONIDE 1 MG/G
CREAM TOPICAL BID
Qty: 454 | Refills: 4 | Status: ERX | COMMUNITY
Start: 2022-10-03

## 2022-10-03 RX ORDER — FLUTICASONE PROPIONATE 0.05 MG/G
OINTMENT TOPICAL
Qty: 30 | Refills: 4 | Status: ERX | COMMUNITY
Start: 2022-10-03

## 2022-10-03 RX ADMIN — TRIAMCINOLONE ACETONIDE: 1 CREAM TOPICAL at 00:00

## 2022-10-03 RX ADMIN — FLUTICASONE PROPIONATE: 0.05 OINTMENT TOPICAL at 00:00

## 2022-10-03 ASSESSMENT — LOCATION ZONE DERM
LOCATION ZONE: ARM
LOCATION ZONE: LEG
LOCATION ZONE: NECK

## 2022-10-03 ASSESSMENT — LOCATION SIMPLE DESCRIPTION DERM
LOCATION SIMPLE: RIGHT FOREARM
LOCATION SIMPLE: LEFT FOREARM
LOCATION SIMPLE: LEFT PRETIBIAL REGION
LOCATION SIMPLE: RIGHT ANTERIOR NECK
LOCATION SIMPLE: RIGHT PRETIBIAL REGION

## 2022-10-03 ASSESSMENT — LOCATION DETAILED DESCRIPTION DERM
LOCATION DETAILED: RIGHT DISTAL PRETIBIAL REGION
LOCATION DETAILED: LEFT PROXIMAL DORSAL FOREARM
LOCATION DETAILED: RIGHT INFERIOR ANTERIOR NECK
LOCATION DETAILED: LEFT PROXIMAL PRETIBIAL REGION
LOCATION DETAILED: RIGHT PROXIMAL DORSAL FOREARM

## 2022-10-03 NOTE — PROCEDURE: PRESCRIPTION MEDICATION MANAGEMENT
Detail Level: Zone
Initiate Treatment: Triamcinolone over body\\nFluticasone ointment to spot treat
Render In Strict Bullet Format?: No

## 2022-10-04 ENCOUNTER — OFFICE VISIT (OUTPATIENT)
Dept: ENT CLINIC | Age: 87
End: 2022-10-04
Payer: MEDICARE

## 2022-10-04 VITALS — HEIGHT: 66 IN | WEIGHT: 134 LBS | BODY MASS INDEX: 21.53 KG/M2

## 2022-10-04 DIAGNOSIS — H92.11 OTORRHEA OF RIGHT EAR: Primary | ICD-10-CM

## 2022-10-04 PROCEDURE — 99204 OFFICE O/P NEW MOD 45 MIN: CPT | Performed by: OTOLARYNGOLOGY

## 2022-10-04 PROCEDURE — G8428 CUR MEDS NOT DOCUMENT: HCPCS | Performed by: OTOLARYNGOLOGY

## 2022-10-04 PROCEDURE — G8400 PT W/DXA NO RESULTS DOC: HCPCS | Performed by: OTOLARYNGOLOGY

## 2022-10-04 PROCEDURE — 1036F TOBACCO NON-USER: CPT | Performed by: OTOLARYNGOLOGY

## 2022-10-04 PROCEDURE — 1090F PRES/ABSN URINE INCON ASSESS: CPT | Performed by: OTOLARYNGOLOGY

## 2022-10-04 PROCEDURE — G8484 FLU IMMUNIZE NO ADMIN: HCPCS | Performed by: OTOLARYNGOLOGY

## 2022-10-04 PROCEDURE — 1123F ACP DISCUSS/DSCN MKR DOCD: CPT | Performed by: OTOLARYNGOLOGY

## 2022-10-04 PROCEDURE — G8420 CALC BMI NORM PARAMETERS: HCPCS | Performed by: OTOLARYNGOLOGY

## 2022-10-04 RX ORDER — FLUTICASONE PROPIONATE 50 MCG
SPRAY, SUSPENSION (ML) NASAL
COMMUNITY

## 2022-10-04 RX ORDER — DIPHENHYDRAMINE HCL 25 MG
CAPSULE ORAL
COMMUNITY

## 2022-10-04 RX ORDER — FUROSEMIDE 20 MG/1
TABLET ORAL
COMMUNITY

## 2022-10-04 ASSESSMENT — ENCOUNTER SYMPTOMS
ABDOMINAL PAIN: 0
SHORTNESS OF BREATH: 0

## 2022-11-03 ENCOUNTER — APPOINTMENT (RX ONLY)
Dept: URBAN - METROPOLITAN AREA CLINIC 329 | Facility: CLINIC | Age: 87
Setting detail: DERMATOLOGY
End: 2022-11-03

## 2022-11-03 DIAGNOSIS — L20.89 OTHER ATOPIC DERMATITIS: ICD-10-CM | Status: IMPROVED

## 2022-11-03 PROBLEM — L20.84 INTRINSIC (ALLERGIC) ECZEMA: Status: ACTIVE | Noted: 2022-11-03

## 2022-11-03 PROCEDURE — ? PRESCRIPTION MEDICATION MANAGEMENT

## 2022-11-03 PROCEDURE — ? COUNSELING

## 2022-11-03 PROCEDURE — 99214 OFFICE O/P EST MOD 30 MIN: CPT

## 2022-11-03 ASSESSMENT — LOCATION ZONE DERM
LOCATION ZONE: LEG
LOCATION ZONE: ARM
LOCATION ZONE: NECK

## 2022-11-03 ASSESSMENT — LOCATION DETAILED DESCRIPTION DERM
LOCATION DETAILED: RIGHT INFERIOR ANTERIOR NECK
LOCATION DETAILED: LEFT PROXIMAL PRETIBIAL REGION
LOCATION DETAILED: LEFT PROXIMAL DORSAL FOREARM
LOCATION DETAILED: RIGHT PROXIMAL DORSAL FOREARM
LOCATION DETAILED: RIGHT DISTAL PRETIBIAL REGION

## 2022-11-03 ASSESSMENT — LOCATION SIMPLE DESCRIPTION DERM
LOCATION SIMPLE: RIGHT PRETIBIAL REGION
LOCATION SIMPLE: LEFT FOREARM
LOCATION SIMPLE: LEFT PRETIBIAL REGION
LOCATION SIMPLE: RIGHT FOREARM
LOCATION SIMPLE: RIGHT ANTERIOR NECK

## 2022-11-03 NOTE — PROCEDURE: PRESCRIPTION MEDICATION MANAGEMENT
Detail Level: Zone
Render In Strict Bullet Format?: No
Continue Regimen: Triamcinolone over body\\nFluticasone ointment to spot treat

## 2023-02-08 ENCOUNTER — OFFICE VISIT (OUTPATIENT)
Dept: ORTHOPEDIC SURGERY | Age: 88
End: 2023-02-08

## 2023-02-08 DIAGNOSIS — M70.72 BURSITIS OF LEFT HIP, UNSPECIFIED BURSA: ICD-10-CM

## 2023-02-08 DIAGNOSIS — M16.12 UNILATERAL PRIMARY OSTEOARTHRITIS, LEFT HIP: Primary | ICD-10-CM

## 2023-02-08 RX ORDER — METHYLPREDNISOLONE 4 MG/1
TABLET ORAL
Qty: 1 KIT | Refills: 0 | Status: SHIPPED | OUTPATIENT
Start: 2023-02-08 | End: 2023-02-14

## 2023-02-08 RX ORDER — METHYLPREDNISOLONE ACETATE 40 MG/ML
40 INJECTION, SUSPENSION INTRA-ARTICULAR; INTRALESIONAL; INTRAMUSCULAR; SOFT TISSUE ONCE
Status: COMPLETED | OUTPATIENT
Start: 2023-02-08 | End: 2023-02-08

## 2023-02-08 RX ADMIN — METHYLPREDNISOLONE ACETATE 40 MG: 40 INJECTION, SUSPENSION INTRA-ARTICULAR; INTRALESIONAL; INTRAMUSCULAR; SOFT TISSUE at 14:12

## 2023-02-08 NOTE — PROGRESS NOTES
Name: Toby Nelson  YOB: 1935  Gender: female  MRN: [id number]    CHIEF COMPLAINT: LEFT HIP PAIN    HPI:  The pain has been present for months. Significance she does have a history of a left hip pinning in place roughly in 2020. Today she denies any groin or thigh pain. There was not an acute injury to the hip. The pain is located over the lateral aspect. It does not hurt to walk too much of a degree. The pain does radiate down the leg to the thigh on the lateral aspect. Numbness and tingling are noted diffusely down the left lower extremity. It hurts to lie on the affected hip over the lateral aspect. Treatment so far has been anti-inflammatory medications. She does using a rolling walker for ambulation chronically. Past Medical History: Allergies: Allergies   Allergen Reactions    Tramadol Other (See Comments)     Makes patient hallucinate    Levofloxacin Palpitations    Sulfamethoxazole-Trimethoprim Rash       Medications:  Current Outpatient Medications   Medication Sig    diclofenac sodium (VOLTAREN) 1 % GEL Apply 2 g topically 4 times daily    diphenhydrAMINE (BENADRYL) 25 MG capsule Benadryl 25 mg capsule   Take 1 capsule every 4 hours by oral route as needed for 30 days. furosemide (LASIX) 20 MG tablet furosemide 20 mg tablet    fluticasone (FLONASE) 50 MCG/ACT nasal spray fluticasone propionate 50 mcg/actuation nasal spray,suspension    losartan (COZAAR) 25 MG tablet Take 1 tablet by mouth in the morning. aspirin 81 MG EC tablet Take 1 tablet by mouth in the morning.     acetaminophen (TYLENOL) 500 MG tablet Take by mouth every 6 hours as needed    atorvastatin (LIPITOR) 80 MG tablet Take 80 mg by mouth daily    Cetirizine HCl 10 MG TBDP Take by mouth    vitamin D 25 MCG (1000 UT) CAPS Take by mouth daily    Darbepoetin Kamran-Polysorbate (ARANESP, ALBUMIN FREE,) 10 MCG/0.4ML SOSY injection Infuse intravenously once    estradiol (ESTRACE) 0.1 MG/GM vaginal cream Place 2 g vaginally daily    HYDROcodone-acetaminophen (NORCO) 5-325 MG per tablet Take 1 tablet by mouth.    levothyroxine (SYNTHROID) 25 MCG tablet Take by mouth every morning (before breakfast)    montelukast (SINGULAIR) 10 MG tablet Take 10 mg by mouth daily    nitroGLYCERIN (NITROSTAT) 0.4 MG SL tablet Place 0.4 mg under the tongue    omeprazole (PRILOSEC) 40 MG delayed release capsule Take 40 mg by mouth daily     No current facility-administered medications for this visit. Past Medical history:  Past Medical History:   Diagnosis Date    Adverse effect of anesthesia     delayed awakening     Aplastic anemia ([facility] 2/10/2017    Asthma     PRN inhaler-last used 3/2022    Atherosclerosis of both carotid arteries     asymptomatic     Bronchogenic cyst 2/10/2017    --DR DWYER    CHF (congestive heart failure) ([facility] 2/10/2017    Chronic cough 2/10/2017    Chronic cystitis 2/10/2017    --UROLOGIST - DR GREWAL    Chronic kidney disease, stage 3a ([facility]     Followed by Dr. Karley Chery and Dr. Calin Jerry (dyspnea on exertion)     Ongoing since COVID diagnosis in January. Pt states improving, ProAir PRN.      Dyslipidemia     GERD (gastroesophageal reflux disease)     H/O echocardiogram 03/15/2022    EF >65%    History of COVID-19 01/2022    History of MRSA infection     History of pneumonia     WITH MRSA / EMPYEMA /SEPSIS/ DEHYDRATION / GI - RESOLVED WAS IN REHAB FOR A WHILE    Hyperlipidemia 2/10/2017    Hypertension 2/10/2017    Hypothyroidism 2/10/2017    IBS (irritable bowel syndrome) 2/9/2017    --DIVERTICULOSIS - [facility]  - DR Ricky Amaro    Mediastinal tumor 2/9/2017    --S/P RESECTION  - SEEN CARDIOLOGIST - STRESS TEST - NORMAL 2012    Myelodysplasia (myelodysplastic syndrome) ([facility] 2/10/2017    --SEES HEMONC    Nonischemic cardiomyopathy ([facility]     hx-EF has normalized, followed by Dr. Bonilla Mojica     Osteoarthritis     Pulmonary hypertension ([facility]     Followed by Dr. Bonilla Mojica     Recurrent UTI Followed by Dr. Rodrick Gaxiola     Seasonal allergic conjunctivitis     Takotsubo cardiomyopathy     History         has a past surgical history that includes gi; Hip fracture surgery (Left, 2020); Cholecystectomy; Tonsillectomy; Hysterectomy; cyst removal; hernia repair; and other surgical history (Left). Family History:  [unfilled]     Social History:  [unfilled]    Review of Systems:         PHYSICAL EXAMINATION:   The patient appears their stated age and they are in no distress. Circulation is normal.  Skin is normal.  Sensation is intact. General medical appearance is normal.  The gait is noted to be without Trendelenburg. Palpation reveals tenderness over the greater trochanter. Range of motion is full. Straight leg test is negative. Stability is normal.  Muscular strength is intact. The opposite hip reveals normal range of motion and no abnormal tenderness on palpation. XRAYS:   AP pelvis and lateral of the LEFT hip taken in the office today are reviewed. Overall well-maintained joint spacing is present and there are 3 cannulated screws in place for previous left hip pinning. There is some shortening of the left hip present compared to the contralateral side. Also very mild superior lateral cystic change  in the acetabular labral region. There is no sign of fracture, dislocation, arthritis or other acute finding. Xray Impression: Mild left hip DJD with previous hip pinning    IMPRESSION:  The patient has trochanteric bursitis of the LEFT hip in a patient with previous left hip pinning and chronic severe back issues with radiculopathy    RECOMMENDATIONS  We discussed treatment options for treatment for the situation. She does have quite severe back issues with advancing radiculopathy on the left lower extremity. For this we did proceed with a cortisone injection the left hip bursa and we did prescribe her a Medrol Dosepak.   We will also have her see one of our spine care providers for further evaluation and treatment of her lower lumbar spine. We also discussed her left hip and she is currently asymptomatic in terms of the left hip and denies any groin or thigh pain today. Her symptoms are stemming from her back primarily. If her hip joint itself comes were problematic she will let us know and otherwise we will see her back on an as-needed basis. Procedure note: Today placed 40mg of Depo-Medrol and 1 cc of 0.5% Marcaine without epi into the LEFT hip. This was done under sterile technique and tolerated without event. Patient will return on a PRN basis.     SYED Lombardi

## 2023-03-07 ENCOUNTER — OFFICE VISIT (OUTPATIENT)
Dept: ORTHOPEDIC SURGERY | Age: 88
End: 2023-03-07

## 2023-03-07 DIAGNOSIS — M54.17 LUMBOSACRAL RADICULOPATHY: ICD-10-CM

## 2023-03-07 DIAGNOSIS — M48.061 SPINAL STENOSIS OF LUMBAR REGION WITHOUT NEUROGENIC CLAUDICATION: ICD-10-CM

## 2023-03-07 DIAGNOSIS — M21.372 LEFT FOOT DROP: ICD-10-CM

## 2023-03-07 DIAGNOSIS — M54.50 LUMBAR SPINE PAIN: Primary | ICD-10-CM

## 2023-03-08 ENCOUNTER — APPOINTMENT (RX ONLY)
Dept: URBAN - METROPOLITAN AREA CLINIC 329 | Facility: CLINIC | Age: 88
Setting detail: DERMATOLOGY
End: 2023-03-08

## 2023-03-08 DIAGNOSIS — L20.89 OTHER ATOPIC DERMATITIS: ICD-10-CM | Status: INADEQUATELY CONTROLLED

## 2023-03-08 DIAGNOSIS — L57.0 ACTINIC KERATOSIS: ICD-10-CM

## 2023-03-08 PROBLEM — L20.84 INTRINSIC (ALLERGIC) ECZEMA: Status: ACTIVE | Noted: 2023-03-08

## 2023-03-08 PROCEDURE — 17003 DESTRUCT PREMALG LES 2-14: CPT

## 2023-03-08 PROCEDURE — ? ADDITIONAL NOTES

## 2023-03-08 PROCEDURE — ? COUNSELING

## 2023-03-08 PROCEDURE — ? PRESCRIPTION MEDICATION MANAGEMENT

## 2023-03-08 PROCEDURE — 17000 DESTRUCT PREMALG LESION: CPT

## 2023-03-08 PROCEDURE — 99214 OFFICE O/P EST MOD 30 MIN: CPT | Mod: 25

## 2023-03-08 PROCEDURE — ? LIQUID NITROGEN

## 2023-03-08 PROCEDURE — ? FULL BODY SKIN EXAM - DECLINED

## 2023-03-08 PROCEDURE — ? PRESCRIPTION

## 2023-03-08 RX ORDER — FLUTICASONE PROPIONATE 0.05 MG/G
OINTMENT TOPICAL
Qty: 60 | Refills: 3 | Status: ERX

## 2023-03-08 RX ORDER — TRIAMCINOLONE ACETONIDE 1 MG/G
CREAM TOPICAL
Qty: 453.6 | Refills: 3 | Status: ERX

## 2023-03-08 ASSESSMENT — LOCATION SIMPLE DESCRIPTION DERM
LOCATION SIMPLE: LEFT FOREARM
LOCATION SIMPLE: RIGHT ANTERIOR NECK
LOCATION SIMPLE: RIGHT PRETIBIAL REGION
LOCATION SIMPLE: LEFT PRETIBIAL REGION
LOCATION SIMPLE: LEFT CHEEK
LOCATION SIMPLE: LEFT NOSE
LOCATION SIMPLE: RIGHT FOREARM

## 2023-03-08 ASSESSMENT — LOCATION DETAILED DESCRIPTION DERM
LOCATION DETAILED: LEFT NASAL ALA
LOCATION DETAILED: LEFT PROXIMAL DORSAL FOREARM
LOCATION DETAILED: RIGHT DISTAL PRETIBIAL REGION
LOCATION DETAILED: LEFT INFERIOR CENTRAL MALAR CHEEK
LOCATION DETAILED: RIGHT PROXIMAL DORSAL FOREARM
LOCATION DETAILED: RIGHT INFERIOR ANTERIOR NECK
LOCATION DETAILED: LEFT PROXIMAL PRETIBIAL REGION

## 2023-03-08 ASSESSMENT — LOCATION ZONE DERM
LOCATION ZONE: NOSE
LOCATION ZONE: ARM
LOCATION ZONE: LEG
LOCATION ZONE: FACE
LOCATION ZONE: NECK

## 2023-03-08 NOTE — PROCEDURE: ADDITIONAL NOTES
Additional Notes: Patient consent was obtained to proceed with the visit and recommended plan of care after discussion of all risks and benefits, including the risks of COVID-19 exposure.
Detail Level: Simple
Render Risk Assessment In Note?: no
Additional Notes: Patient states she is not allowed to keep meds in her room. Dr. Felix wrote a note on prescription pad to allow patient to use meds as needed. Photo taken of note.

## 2023-03-08 NOTE — PROGRESS NOTES
Date:  03/08/23     HPI:  I am seeing Kerwin Brambila in evalution/folowup. Extended follow-up appointment of 45 minutes. Full discussion of symptomatology and dressing several entities. She has had persistent pain in the left lower lumbar paraspinal area and buttock. A constant dull pain that is worse with sitting in a hard chair, standing, walking. Better with lying down or applying heat. The pain may gravitate down the lateral aspect of the left leg to above the knee which she feels there might be some weakness with this. She has some paresthetic symptoms in the left leg and also some cramping sensations in the left calf. She has had MRI lumbar spine in October 2020 that revealed scoliosis, degenerative disc disease with moderate spinal stenosis and recess stenosis at the L 3-L4 level, also moderate spinal stenosis at the L4-L5 level. Around that time she was evaluated by Dr. Edwardo Keita and underwent conservative measures since there was some concern of a possible infectious issues. She was evaluated by aneurosurgeon around that time it was felt that spine surgery should be more of a last resort. Patient has some comorbidities to include myelodysplastic syndrome, CHF, etc.    She has had no response to ibuprofen and naproxen. Gets Liberty from her hematologist.  Muscle relaxers produced excessive sedation. Oral steroids only had a slight benefit. She has had prior spine injections to a pain management provider 2 or 3 years ago that were not overly beneficial, but do not have access to exactly what those injections were. There was no discussion of an RFA procedure or spinal stimulation. The main concern is that she feels that her left leg is getting weaker.   She does use a rolling walker for stability    Past Medical History:   Diagnosis Date    Adverse effect of anesthesia     delayed awakening     Aplastic anemia (HCC) 2/10/2017    Asthma     PRN inhaler-last used 3/2022    Atherosclerosis of both carotid arteries     asymptomatic     Bronchogenic cyst 2/10/2017    --DR DWYER    CHF (congestive heart failure) ([facility] 2/10/2017    Chronic cough 2/10/2017    Chronic cystitis 2/10/2017    --UROLOGIST - DR GREWAL    Chronic kidney disease, stage 3a ([facility]     Followed by Dr. Landry Blandon and Dr. Valencia Gomez (dyspnea on exertion)     Ongoing since COVID diagnosis in January. Pt states improving, ProAir PRN. Dyslipidemia     GERD (gastroesophageal reflux disease)     H/O echocardiogram 03/15/2022    EF >65%    History of COVID-19 01/2022    History of MRSA infection     History of pneumonia     WITH MRSA / EMPYEMA /SEPSIS/ DEHYDRATION / GI - RESOLVED WAS IN REHAB FOR A WHILE    Hyperlipidemia 2/10/2017    Hypertension 2/10/2017    Hypothyroidism 2/10/2017    IBS (irritable bowel syndrome) 2/9/2017    --DIVERTICULOSIS - [facility]  - DR Enrico Oro    Mediastinal tumor 2/9/2017    --S/P RESECTION  - SEEN CARDIOLOGIST - STRESS TEST - NORMAL 2012    Myelodysplasia (myelodysplastic syndrome) ([facility] 2/10/2017    --SEES HEMONC    Nonischemic cardiomyopathy ([facility]     hx-EF has normalized, followed by Dr. Juan Ramirez     Osteoarthritis     Pulmonary hypertension ([facility]     Followed by Dr. Juan Ramirez     Recurrent UTI     Followed by Dr. Davon Morton     Seasonal allergic conjunctivitis     Takotsubo cardiomyopathy     History         Past Surgical History:   Procedure Laterality Date    CHOLECYSTECTOMY      CYST REMOVAL      MEDIASTINAL TUMOR  - BENIGN    GI      bleeding ulcer repair     HERNIA REPAIR      HIP FRACTURE SURGERY Left 2020    HYSTERECTOMY (CERVIX STATUS UNKNOWN)      OTHER SURGICAL HISTORY Left     STAPEDECTOMY     TONSILLECTOMY          Family History   Problem Relation Age of Onset    Diabetes Other         SON; DAUGHTER    Diabetes Brother     Cancer Mother         BREAST    Heart Disease Father         Social History     Socioeconomic History    Marital status:       Spouse name: Not on file Number of children: Not on file    Years of education: Not on file    Highest education level: Not on file   Occupational History    Not on file   Tobacco Use    Smoking status: Never    Smokeless tobacco: Never   Substance and Sexual Activity    Alcohol use: No    Drug use: Never    Sexual activity: Not on file   Other Topics Concern    Not on file   Social History Narrative    Not on file     Social Determinants of Health     Financial Resource Strain: Not on file   Food Insecurity: Not on file   Transportation Needs: Not on file   Physical Activity: Not on file   Stress: Not on file   Social Connections: Not on file   Intimate Partner Violence: Not on file   Housing Stability: Not on file        Review of Systems       Current Outpatient Medications   Medication Sig Dispense Refill    diclofenac sodium (VOLTAREN) 1 % GEL Apply 2 g topically 4 times daily      diphenhydrAMINE (BENADRYL) 25 MG capsule Benadryl 25 mg capsule   Take 1 capsule every 4 hours by oral route as needed for 30 days. furosemide (LASIX) 20 MG tablet furosemide 20 mg tablet      fluticasone (FLONASE) 50 MCG/ACT nasal spray fluticasone propionate 50 mcg/actuation nasal spray,suspension      losartan (COZAAR) 25 MG tablet Take 1 tablet by mouth in the morning. 30 tablet 0    aspirin 81 MG EC tablet Take 1 tablet by mouth in the morning.  30 tablet 0    acetaminophen (TYLENOL) 500 MG tablet Take by mouth every 6 hours as needed      atorvastatin (LIPITOR) 80 MG tablet Take 80 mg by mouth daily      Cetirizine HCl 10 MG TBDP Take by mouth      vitamin D 25 MCG (1000 UT) CAPS Take by mouth daily      Darbepoetin Kamran-Polysorbate (ARANESP, ALBUMIN FREE,) 10 MCG/0.4ML SOSY injection Infuse intravenously once      estradiol (ESTRACE) 0.1 MG/GM vaginal cream Place 2 g vaginally daily      HYDROcodone-acetaminophen (NORCO) 5-325 MG per tablet Take 1 tablet by mouth.      levothyroxine (SYNTHROID) 25 MCG tablet Take by mouth every morning (before breakfast)      montelukast (SINGULAIR) 10 MG tablet Take 10 mg by mouth daily      nitroGLYCERIN (NITROSTAT) 0.4 MG SL tablet Place 0.4 mg under the tongue      omeprazole (PRILOSEC) 40 MG delayed release capsule Take 40 mg by mouth daily       No current facility-administered medications for this visit. Allergies   Allergen Reactions    Tramadol Other (See Comments)     Makes patient hallucinate    Levofloxacin Palpitations    Sulfamethoxazole-Trimethoprim Rash              PHYSICAL EXAM    General: Alert and cooperative    HEENT: Clear speech    Motor Exam: Good strength exception of weakness of dorsiflexion more so than proximal strength on the left. She has some trouble overcoming gravity with dorsiflexion. Sensory Exam: No lateralizing findings    Deep Tendon Reflexes: Decreased reflexes in LE's    Cerebellar Exam: No ataxia in the Ue's    Extremities: Deferred    Gait / Station: Ambulates with walker  She has tenderness in the lower lumbar paraspinal areas  Lumbar Spine: Has tenderness left lower lumbar paraspinal area and buttock. IMPRESSION/PLAN:   Predominantly musculoskeletal lumbosacral spine pain. In the context of significant spinal stenosis, she is having progressive weakness in the left lower extremity. The patient has not been felt  to be a great surgical candidate but I feel obligated to try to explain why the left leg is getting worse from an anatomical standpoint lumbar spine. She will probably just have to live with this. I do think an AFO should be ordered and I will do that. We will have her scheduled for left L5 and S1 selective nerve root blocks at first availability and offer some palliation. Not a whole lot I can add or offer from a medicinal standpoint. If I cannot get spine injections offered benefit, there is not much I can add or offer here POA under the current circumstance. Patient is aware of this.     Other aftercare instructions:  Nothing pertinent today      Juan Zhao MD

## 2023-03-15 ENCOUNTER — TELEPHONE (OUTPATIENT)
Dept: ORTHOPEDIC SURGERY | Age: 88
End: 2023-03-15

## 2023-03-15 ENCOUNTER — OFFICE VISIT (OUTPATIENT)
Dept: ORTHOPEDIC SURGERY | Age: 88
End: 2023-03-15

## 2023-03-15 DIAGNOSIS — M54.17 LUMBOSACRAL RADICULOPATHY: Primary | ICD-10-CM

## 2023-03-15 DIAGNOSIS — M54.50 LUMBAR SPINE PAIN: ICD-10-CM

## 2023-03-15 RX ORDER — TRIAMCINOLONE ACETONIDE 40 MG/ML
280 INJECTION, SUSPENSION INTRA-ARTICULAR; INTRAMUSCULAR ONCE
Status: COMPLETED | OUTPATIENT
Start: 2023-03-15 | End: 2023-03-15

## 2023-03-15 RX ADMIN — TRIAMCINOLONE ACETONIDE 280 MG: 40 INJECTION, SUSPENSION INTRA-ARTICULAR; INTRAMUSCULAR at 16:25

## 2023-03-15 NOTE — TELEPHONE ENCOUNTER
Patient presents today for scheduled spine injection. She has left lower extremity weakness. MRI lumbar spine reveals some progression of induration compared to her prior study over 2 years ago. This reveals degenerative changes, facet arthropathy, recess stenosis that could affect nerves around the right L2-L3 level. Also neuroforaminal narrowing more so left than right at the L1 4-5 and L5-S1 level. There is also a protruding disc that abuts the left S1 nerve root. There are incidental findings of what were felt to be incompletely imaged renal cysts but the patient tells me that she carries a diagnosis of renal cysts. Therefore I do not think any further evaluation or studies are required. She has enough degenerative changes could explain her weakness in the left leg and nothing I can do other than follow along. She is not interested in spine surgical options and full details are in my initial note. She is currently in the process of going through physical therapy and being fitted for a left AFO.

## 2023-03-15 NOTE — PROGRESS NOTES
Date: 03/15/23   Name: Gurwinder Khan    Pre-Procedural Diagnosis:    Diagnosis Orders   1. Lumbosacral radiculopathy  FL NERVE BLOCK LUMBOSACRAL 1ST    FL NERVE BLOCK LUMBOSACRAL EACH ADD    triamcinolone acetonide (KENALOG-40) injection 280 mg      2. Lumbar spine pain  INJECT TRIGGER POINT, 1 OR 2    triamcinolone acetonide (KENALOG-40) injection 280 mg          Procedure: Selective Nerve Root Blocks (Transforaminal) - Multiple Level with lumbar trigger point injections    Precautions:  [facility] Precautions spine injections: None. Patient denies any prior sensitivity to steroid, local anesthetic, contrast dye, iodine or shellfish. The procedure was discussed at length with the patient and informed consent was signed. The patient was placed in a prone position on the fluoroscopy table and the skin was prepped and draped in a routine sterile fashion. The areas to be injected were each anesthetized with approximately 5 cc of 1% Lidocaine. A 22-gauge 3.5 inch inch spinal needle was carefully advanced under fluoroscopic guidance to the left L5 transforaminal space and subsequently the left  S1 transforaminal space. At this time 0.25 cc of omnipaque administered. . Once proper placement was confirmed, 2 cc of 0.25% Marcaine and 80 mg of Kenalog were injected through the spinal needle at each site. After informed oral consent, patient was prepped per routine. The left lower lumbar paraspinal area(s) was injected. 60 mg of Kenalog with 1 cc of 0.25% Marcaine injected at that site. Patient tolerated well. Band aid(s) applied. Fluoroscopic guidance was used intermittently over a 10-minute period to insure proper needle placement and patient safety. A hard copy of the fluoroscopic  images has been placed in the patient's chart. The patient was monitored after the procedure and discharged home in stable fashion. A total of two muscles/sites injected today for trigger point charge.     Resume Meds:    Pt remains on asa 81 mg.    Keaton Swenson MD  03/15/23

## 2023-11-07 ENCOUNTER — APPOINTMENT (RX ONLY)
Dept: URBAN - METROPOLITAN AREA CLINIC 329 | Facility: CLINIC | Age: 88
Setting detail: DERMATOLOGY
End: 2023-11-07

## 2023-11-07 DIAGNOSIS — L57.8 OTHER SKIN CHANGES DUE TO CHRONIC EXPOSURE TO NONIONIZING RADIATION: ICD-10-CM

## 2023-11-07 PROBLEM — D48.5 NEOPLASM OF UNCERTAIN BEHAVIOR OF SKIN: Status: ACTIVE | Noted: 2023-11-07

## 2023-11-07 PROCEDURE — 11102 TANGNTL BX SKIN SINGLE LES: CPT

## 2023-11-07 PROCEDURE — ? FULL BODY SKIN EXAM - DECLINED

## 2023-11-07 PROCEDURE — 99213 OFFICE O/P EST LOW 20 MIN: CPT | Mod: 25

## 2023-11-07 PROCEDURE — ? TREATMENT REGIMEN

## 2023-11-07 PROCEDURE — ? BIOPSY BY SHAVE METHOD

## 2023-11-07 PROCEDURE — ? COUNSELING

## 2023-11-07 ASSESSMENT — LOCATION DETAILED DESCRIPTION DERM: LOCATION DETAILED: NASAL DORSUM

## 2023-11-07 ASSESSMENT — LOCATION SIMPLE DESCRIPTION DERM: LOCATION SIMPLE: NOSE

## 2023-11-07 ASSESSMENT — LOCATION ZONE DERM: LOCATION ZONE: NOSE

## 2023-11-07 NOTE — PROCEDURE: BIOPSY BY SHAVE METHOD
Detail Level: Detailed
Depth Of Biopsy: dermis
Was A Bandage Applied: Yes
Size Of Lesion In Cm: 0.7
X Size Of Lesion In Cm: 0
Biopsy Type: H and E
Biopsy Method: Dermablade
Anesthesia Type: 1% lidocaine with epinephrine and a 1:10 solution of 8.4% sodium bicarbonate
Anesthesia Volume In Cc: 0.5
Hemostasis: Aluminum Chloride
Wound Care: Petrolatum
Dressing: bandage
Destruction After The Procedure: No
Type Of Destruction Used: Curettage
Curettage Text: The wound bed was treated with curettage after the biopsy was performed.
Cryotherapy Text: The wound bed was treated with cryotherapy after the biopsy was performed.
Electrodesiccation Text: The wound bed was treated with electrodesiccation after the biopsy was performed.
Electrodesiccation And Curettage Text: The wound bed was treated with electrodesiccation and curettage after the biopsy was performed.
Silver Nitrate Text: The wound bed was treated with silver nitrate after the biopsy was performed.
Lab: 6
Lab Facility: 3
Consent was obtained and risks were reviewed including but not limited to scarring, infection, bleeding, scabbing, incomplete removal, nerve damage and allergy to anesthesia.
Post-Care Instructions: I reviewed with the patient in detail post-care instructions. Patient is to keep the biopsy site dry overnight, and then apply petrolatum twice daily until healed.
Notification Instructions: Patient will be notified of biopsy results. However, patient instructed to call the office if not contacted within 2 weeks.
Billing Type: Third-Party Bill
Information: Selecting Yes will display possible errors in your note based on the variables you have selected. This validation is only offered as a suggestion for you. PLEASE NOTE THAT THE VALIDATION TEXT WILL BE REMOVED WHEN YOU FINALIZE YOUR NOTE. IF YOU WANT TO FAX A PRELIMINARY NOTE YOU WILL NEED TO TOGGLE THIS TO 'NO' IF YOU DO NOT WANT IT IN YOUR FAXED NOTE.

## 2024-01-01 ENCOUNTER — HOSPICE ADMISSION (OUTPATIENT)
Dept: HOSPICE | Facility: HOSPICE | Age: 89
End: 2024-01-01
Payer: MEDICARE

## 2024-01-01 PROCEDURE — 0656 HSPC GENERAL INPATIENT

## 2024-01-11 ENCOUNTER — APPOINTMENT (OUTPATIENT)
Dept: CT IMAGING | Age: 89
DRG: 871 | End: 2024-01-11
Payer: MEDICARE

## 2024-01-11 ENCOUNTER — HOSPITAL ENCOUNTER (INPATIENT)
Age: 89
LOS: 3 days | Discharge: HOSPICE | DRG: 871 | End: 2024-01-14
Attending: EMERGENCY MEDICINE | Admitting: STUDENT IN AN ORGANIZED HEALTH CARE EDUCATION/TRAINING PROGRAM
Payer: MEDICARE

## 2024-01-11 ENCOUNTER — APPOINTMENT (OUTPATIENT)
Dept: GENERAL RADIOLOGY | Age: 89
DRG: 871 | End: 2024-01-11
Payer: MEDICARE

## 2024-01-11 DIAGNOSIS — N13.30 BILATERAL HYDRONEPHROSIS: ICD-10-CM

## 2024-01-11 DIAGNOSIS — N39.0 URINARY TRACT INFECTION WITH HEMATURIA, SITE UNSPECIFIED: Primary | ICD-10-CM

## 2024-01-11 DIAGNOSIS — C67.8 MALIGNANT NEOPLASM OF OVERLAPPING SITES OF BLADDER (HCC): ICD-10-CM

## 2024-01-11 DIAGNOSIS — R31.9 URINARY TRACT INFECTION WITH HEMATURIA, SITE UNSPECIFIED: Primary | ICD-10-CM

## 2024-01-11 DIAGNOSIS — N17.9 AKI (ACUTE KIDNEY INJURY) (HCC): ICD-10-CM

## 2024-01-11 DIAGNOSIS — Z51.5 HOSPICE CARE PATIENT: ICD-10-CM

## 2024-01-11 DIAGNOSIS — L89.152 PRESSURE INJURY OF SACRAL REGION, STAGE 2 (HCC): ICD-10-CM

## 2024-01-11 DIAGNOSIS — D46.9 MYELODYSPLASIA (MYELODYSPLASTIC SYNDROME) (HCC): ICD-10-CM

## 2024-01-11 DIAGNOSIS — N13.39 OTHER HYDRONEPHROSIS: ICD-10-CM

## 2024-01-11 PROBLEM — G93.41 ACUTE METABOLIC ENCEPHALOPATHY: Status: ACTIVE | Noted: 2024-01-11

## 2024-01-11 PROBLEM — A41.9 SEPSIS WITH ACUTE RENAL FAILURE WITHOUT SEPTIC SHOCK (HCC): Status: ACTIVE | Noted: 2024-01-01

## 2024-01-11 PROBLEM — E44.0 MODERATE PROTEIN-CALORIE MALNUTRITION (HCC): Status: ACTIVE | Noted: 2024-01-01

## 2024-01-11 PROBLEM — K21.9 GASTROESOPHAGEAL REFLUX DISEASE WITHOUT ESOPHAGITIS: Status: ACTIVE | Noted: 2021-02-12

## 2024-01-11 PROBLEM — N32.89 BLADDER MASS: Status: ACTIVE | Noted: 2024-01-01

## 2024-01-11 PROBLEM — L00 STAPHYLOCOCCAL SCALDED SKIN SYNDROME: Status: ACTIVE | Noted: 2024-01-01

## 2024-01-11 PROBLEM — R65.20 SEPSIS WITH ACUTE RENAL FAILURE WITHOUT SEPTIC SHOCK (HCC): Status: ACTIVE | Noted: 2024-01-01

## 2024-01-11 LAB
ALBUMIN SERPL-MCNC: 2.4 G/DL (ref 3.2–4.6)
ALBUMIN/GLOB SERPL: 0.5 (ref 0.4–1.6)
ALP SERPL-CCNC: 97 U/L (ref 50–136)
ALT SERPL-CCNC: 37 U/L (ref 12–65)
AMORPH CRY URNS QL MICRO: ABNORMAL
ANION GAP SERPL CALC-SCNC: 6 MMOL/L (ref 2–11)
APPEARANCE UR: ABNORMAL
AST SERPL-CCNC: 26 U/L (ref 15–37)
BACTERIA URNS QL MICRO: ABNORMAL /HPF
BASOPHILS # BLD: 0 K/UL (ref 0–0.2)
BASOPHILS NFR BLD: 0 % (ref 0–2)
BILIRUB SERPL-MCNC: 1.2 MG/DL (ref 0.2–1.1)
BILIRUB UR QL: NEGATIVE
BUN SERPL-MCNC: 39 MG/DL (ref 8–23)
CALCIUM SERPL-MCNC: 10.1 MG/DL (ref 8.3–10.4)
CASTS URNS QL MICRO: 0 /LPF
CHLORIDE SERPL-SCNC: 110 MMOL/L (ref 103–113)
CO2 SERPL-SCNC: 19 MMOL/L (ref 21–32)
COLOR UR: ABNORMAL
CREAT SERPL-MCNC: 2.2 MG/DL (ref 0.6–1)
CRYSTALS URNS QL MICRO: 0 /LPF
DIFFERENTIAL METHOD BLD: ABNORMAL
EKG ATRIAL RATE: 104 BPM
EKG DIAGNOSIS: NORMAL
EKG P AXIS: 82 DEGREES
EKG P-R INTERVAL: 145 MS
EKG Q-T INTERVAL: 319 MS
EKG QRS DURATION: 90 MS
EKG QTC CALCULATION (BAZETT): 438 MS
EKG R AXIS: 61 DEGREES
EKG T AXIS: 91 DEGREES
EKG VENTRICULAR RATE: 113 BPM
EOSINOPHIL # BLD: 0 K/UL (ref 0–0.8)
EOSINOPHIL NFR BLD: 0 % (ref 0.5–7.8)
EPI CELLS #/AREA URNS HPF: ABNORMAL /HPF
ERYTHROCYTE [DISTWIDTH] IN BLOOD BY AUTOMATED COUNT: 21.9 % (ref 11.9–14.6)
GLOBULIN SER CALC-MCNC: 4.6 G/DL (ref 2.8–4.5)
GLUCOSE SERPL-MCNC: 77 MG/DL (ref 65–100)
GLUCOSE UR STRIP.AUTO-MCNC: 100 MG/DL
HCT VFR BLD AUTO: 24.3 % (ref 35.8–46.3)
HGB BLD-MCNC: 7.1 G/DL (ref 11.7–15.4)
HGB UR QL STRIP: ABNORMAL
IMM GRANULOCYTES # BLD AUTO: 1.4 K/UL (ref 0–0.5)
IMM GRANULOCYTES NFR BLD AUTO: 10 % (ref 0–5)
KETONES UR QL STRIP.AUTO: NEGATIVE MG/DL
LACTATE SERPL-SCNC: 1.2 MMOL/L (ref 0.4–2)
LEUKOCYTE ESTERASE UR QL STRIP.AUTO: ABNORMAL
LYMPHOCYTES # BLD: 2.6 K/UL (ref 0.5–4.6)
LYMPHOCYTES NFR BLD: 19 % (ref 13–44)
MCH RBC QN AUTO: 31.1 PG (ref 26.1–32.9)
MCHC RBC AUTO-ENTMCNC: 29.2 G/DL (ref 31.4–35)
MCV RBC AUTO: 106.6 FL (ref 82–102)
MONOCYTES # BLD: 0.8 K/UL (ref 0.1–1.3)
MONOCYTES NFR BLD: 6 % (ref 4–12)
MUCOUS THREADS URNS QL MICRO: 0 /LPF
NEUTS SEG # BLD: 8.9 K/UL (ref 1.7–8.2)
NEUTS SEG NFR BLD: 65 % (ref 43–78)
NITRITE UR QL STRIP.AUTO: POSITIVE
NRBC # BLD: 0.05 K/UL (ref 0–0.2)
OTHER OBSERVATIONS: ABNORMAL
PH UR STRIP: 7 (ref 5–9)
PLATELET # BLD AUTO: 431 K/UL (ref 150–450)
PLATELET COMMENT: ADEQUATE
PMV BLD AUTO: 12.8 FL (ref 9.4–12.3)
POTASSIUM SERPL-SCNC: 4.1 MMOL/L (ref 3.5–5.1)
PROCALCITONIN SERPL-MCNC: 0.32 NG/ML (ref 0–0.49)
PROT SERPL-MCNC: 7 G/DL (ref 6.3–8.2)
PROT UR STRIP-MCNC: 100 MG/DL
RBC # BLD AUTO: 2.28 M/UL (ref 4.05–5.2)
RBC #/AREA URNS HPF: >100 /HPF
RBC MORPH BLD: ABNORMAL
RBC MORPH BLD: ABNORMAL
SODIUM SERPL-SCNC: 135 MMOL/L (ref 136–146)
SP GR UR REFRACTOMETRY: 1.01 (ref 1–1.02)
UROBILINOGEN UR QL STRIP.AUTO: 2 EU/DL (ref 0.2–1)
WBC # BLD AUTO: 13.7 K/UL (ref 4.3–11.1)
WBC MORPH BLD: ABNORMAL
WBC URNS QL MICRO: >100 /HPF

## 2024-01-11 PROCEDURE — 99285 EMERGENCY DEPT VISIT HI MDM: CPT

## 2024-01-11 PROCEDURE — 84145 PROCALCITONIN (PCT): CPT

## 2024-01-11 PROCEDURE — 93010 ELECTROCARDIOGRAM REPORT: CPT | Performed by: INTERNAL MEDICINE

## 2024-01-11 PROCEDURE — 1100000000 HC RM PRIVATE

## 2024-01-11 PROCEDURE — 80053 COMPREHEN METABOLIC PANEL: CPT

## 2024-01-11 PROCEDURE — 96375 TX/PRO/DX INJ NEW DRUG ADDON: CPT

## 2024-01-11 PROCEDURE — 87088 URINE BACTERIA CULTURE: CPT

## 2024-01-11 PROCEDURE — 99223 1ST HOSP IP/OBS HIGH 75: CPT | Performed by: UROLOGY

## 2024-01-11 PROCEDURE — 2580000003 HC RX 258: Performed by: STUDENT IN AN ORGANIZED HEALTH CARE EDUCATION/TRAINING PROGRAM

## 2024-01-11 PROCEDURE — 71045 X-RAY EXAM CHEST 1 VIEW: CPT

## 2024-01-11 PROCEDURE — 36415 COLL VENOUS BLD VENIPUNCTURE: CPT

## 2024-01-11 PROCEDURE — 6370000000 HC RX 637 (ALT 250 FOR IP): Performed by: STUDENT IN AN ORGANIZED HEALTH CARE EDUCATION/TRAINING PROGRAM

## 2024-01-11 PROCEDURE — 87086 URINE CULTURE/COLONY COUNT: CPT

## 2024-01-11 PROCEDURE — 81001 URINALYSIS AUTO W/SCOPE: CPT

## 2024-01-11 PROCEDURE — 85025 COMPLETE CBC W/AUTO DIFF WBC: CPT

## 2024-01-11 PROCEDURE — 83605 ASSAY OF LACTIC ACID: CPT

## 2024-01-11 PROCEDURE — 87186 SC STD MICRODIL/AGAR DIL: CPT

## 2024-01-11 PROCEDURE — 93005 ELECTROCARDIOGRAM TRACING: CPT | Performed by: EMERGENCY MEDICINE

## 2024-01-11 PROCEDURE — 87040 BLOOD CULTURE FOR BACTERIA: CPT

## 2024-01-11 PROCEDURE — 96361 HYDRATE IV INFUSION ADD-ON: CPT

## 2024-01-11 PROCEDURE — 6360000002 HC RX W HCPCS: Performed by: EMERGENCY MEDICINE

## 2024-01-11 PROCEDURE — 74176 CT ABD & PELVIS W/O CONTRAST: CPT

## 2024-01-11 PROCEDURE — 2580000003 HC RX 258: Performed by: EMERGENCY MEDICINE

## 2024-01-11 PROCEDURE — 96374 THER/PROPH/DIAG INJ IV PUSH: CPT

## 2024-01-11 RX ORDER — MORPHINE SULFATE 2 MG/ML
2 INJECTION, SOLUTION INTRAMUSCULAR; INTRAVENOUS
Status: COMPLETED | OUTPATIENT
Start: 2024-01-11 | End: 2024-01-11

## 2024-01-11 RX ORDER — ACETAMINOPHEN 325 MG/1
650 TABLET ORAL EVERY 6 HOURS PRN
Status: DISCONTINUED | OUTPATIENT
Start: 2024-01-11 | End: 2024-01-14 | Stop reason: HOSPADM

## 2024-01-11 RX ORDER — SODIUM CHLORIDE 9 MG/ML
INJECTION, SOLUTION INTRAVENOUS PRN
Status: DISCONTINUED | OUTPATIENT
Start: 2024-01-11 | End: 2024-01-14 | Stop reason: HOSPADM

## 2024-01-11 RX ORDER — ONDANSETRON 4 MG/1
4 TABLET, ORALLY DISINTEGRATING ORAL EVERY 8 HOURS PRN
Status: DISCONTINUED | OUTPATIENT
Start: 2024-01-11 | End: 2024-01-14 | Stop reason: HOSPADM

## 2024-01-11 RX ORDER — POLYETHYLENE GLYCOL 3350 17 G/17G
17 POWDER, FOR SOLUTION ORAL DAILY PRN
Status: DISCONTINUED | OUTPATIENT
Start: 2024-01-11 | End: 2024-01-14 | Stop reason: HOSPADM

## 2024-01-11 RX ORDER — ONDANSETRON 2 MG/ML
4 INJECTION INTRAMUSCULAR; INTRAVENOUS EVERY 6 HOURS PRN
Status: DISCONTINUED | OUTPATIENT
Start: 2024-01-11 | End: 2024-01-14 | Stop reason: HOSPADM

## 2024-01-11 RX ORDER — ACETAMINOPHEN 650 MG/1
650 SUPPOSITORY RECTAL EVERY 6 HOURS PRN
Status: DISCONTINUED | OUTPATIENT
Start: 2024-01-11 | End: 2024-01-14 | Stop reason: HOSPADM

## 2024-01-11 RX ORDER — SODIUM CHLORIDE 9 MG/ML
INJECTION, SOLUTION INTRAVENOUS CONTINUOUS
Status: DISCONTINUED | OUTPATIENT
Start: 2024-01-11 | End: 2024-01-12

## 2024-01-11 RX ORDER — ATORVASTATIN CALCIUM 80 MG/1
80 TABLET, FILM COATED ORAL DAILY
Status: DISCONTINUED | OUTPATIENT
Start: 2024-01-12 | End: 2024-01-13

## 2024-01-11 RX ORDER — HYDROCODONE BITARTRATE AND ACETAMINOPHEN 5; 325 MG/1; MG/1
1 TABLET ORAL EVERY 6 HOURS PRN
Status: DISCONTINUED | OUTPATIENT
Start: 2024-01-11 | End: 2024-01-13

## 2024-01-11 RX ORDER — SODIUM CHLORIDE 450 MG/100ML
INJECTION, SOLUTION INTRAVENOUS CONTINUOUS
Status: DISCONTINUED | OUTPATIENT
Start: 2024-01-11 | End: 2024-01-11

## 2024-01-11 RX ORDER — SODIUM CHLORIDE 0.9 % (FLUSH) 0.9 %
5-40 SYRINGE (ML) INJECTION PRN
Status: DISCONTINUED | OUTPATIENT
Start: 2024-01-11 | End: 2024-01-14 | Stop reason: HOSPADM

## 2024-01-11 RX ORDER — ONDANSETRON 2 MG/ML
4 INJECTION INTRAMUSCULAR; INTRAVENOUS
Status: COMPLETED | OUTPATIENT
Start: 2024-01-11 | End: 2024-01-11

## 2024-01-11 RX ORDER — LEVOTHYROXINE SODIUM 0.05 MG/1
25 TABLET ORAL
Status: DISCONTINUED | OUTPATIENT
Start: 2024-01-12 | End: 2024-01-13

## 2024-01-11 RX ORDER — 0.9 % SODIUM CHLORIDE 0.9 %
500 INTRAVENOUS SOLUTION INTRAVENOUS
Status: COMPLETED | OUTPATIENT
Start: 2024-01-11 | End: 2024-01-11

## 2024-01-11 RX ORDER — HEPARIN SODIUM 5000 [USP'U]/ML
5000 INJECTION, SOLUTION INTRAVENOUS; SUBCUTANEOUS 2 TIMES DAILY
Status: DISCONTINUED | OUTPATIENT
Start: 2024-01-12 | End: 2024-01-14 | Stop reason: HOSPADM

## 2024-01-11 RX ORDER — SODIUM CHLORIDE 0.9 % (FLUSH) 0.9 %
5-40 SYRINGE (ML) INJECTION EVERY 12 HOURS SCHEDULED
Status: DISCONTINUED | OUTPATIENT
Start: 2024-01-11 | End: 2024-01-14 | Stop reason: HOSPADM

## 2024-01-11 RX ORDER — ASPIRIN 81 MG/1
81 TABLET ORAL DAILY
Status: DISCONTINUED | OUTPATIENT
Start: 2024-01-12 | End: 2024-01-13

## 2024-01-11 RX ADMIN — SODIUM CHLORIDE, PRESERVATIVE FREE 10 ML: 5 INJECTION INTRAVENOUS at 21:39

## 2024-01-11 RX ADMIN — ONDANSETRON 4 MG: 2 INJECTION INTRAMUSCULAR; INTRAVENOUS at 10:25

## 2024-01-11 RX ADMIN — MORPHINE SULFATE 2 MG: 2 INJECTION, SOLUTION INTRAMUSCULAR; INTRAVENOUS at 10:25

## 2024-01-11 RX ADMIN — WATER 1000 MG: 1 INJECTION INTRAMUSCULAR; INTRAVENOUS; SUBCUTANEOUS at 10:25

## 2024-01-11 RX ADMIN — SODIUM CHLORIDE 500 ML: 9 INJECTION, SOLUTION INTRAVENOUS at 10:33

## 2024-01-11 RX ADMIN — HYDROCODONE BITARTRATE AND ACETAMINOPHEN 1 TABLET: 5; 325 TABLET ORAL at 16:07

## 2024-01-11 RX ADMIN — SODIUM CHLORIDE: 9 INJECTION, SOLUTION INTRAVENOUS at 15:12

## 2024-01-11 ASSESSMENT — PAIN DESCRIPTION - ORIENTATION: ORIENTATION: POSTERIOR

## 2024-01-11 ASSESSMENT — PAIN - FUNCTIONAL ASSESSMENT
PAIN_FUNCTIONAL_ASSESSMENT: PREVENTS OR INTERFERES SOME ACTIVE ACTIVITIES AND ADLS
PAIN_FUNCTIONAL_ASSESSMENT: 0-10

## 2024-01-11 ASSESSMENT — LIFESTYLE VARIABLES
HOW MANY STANDARD DRINKS CONTAINING ALCOHOL DO YOU HAVE ON A TYPICAL DAY: PATIENT DOES NOT DRINK
HOW OFTEN DO YOU HAVE A DRINK CONTAINING ALCOHOL: NEVER

## 2024-01-11 ASSESSMENT — PAIN DESCRIPTION - FREQUENCY: FREQUENCY: CONTINUOUS

## 2024-01-11 ASSESSMENT — PAIN DESCRIPTION - LOCATION
LOCATION: BACK;BUTTOCKS
LOCATION: BUTTOCKS

## 2024-01-11 ASSESSMENT — PAIN SCALES - GENERAL
PAINLEVEL_OUTOF10: 9
PAINLEVEL_OUTOF10: 0
PAINLEVEL_OUTOF10: 9

## 2024-01-11 ASSESSMENT — PAIN DESCRIPTION - DESCRIPTORS: DESCRIPTORS: ACHING

## 2024-01-11 NOTE — ED NOTES
TRANSFER - OUT REPORT:    Verbal report given to Yessenia ANDERSON on Lillian Martin Bruton  being transferred to [facility] for routine progression of patient care       Report consisted of patient's Situation, Background, Assessment and   Recommendations(SBAR).     Information from the following report(s) ED SBAR was reviewed with the receiving nurse.    Savannah Fall Assessment:    Presents to emergency department  because of falls (Syncope, seizure, or loss of consciousness): No  Age > 70: Yes  Altered Mental Status, Intoxication with alcohol or substance confusion (Disorientation, impaired judgment, poor safety awaremess, or inability to follow instructions): No  Impaired Mobility: Ambulates or transfers with assistive devices or assistance; Unable to ambulate or transer.: Yes             Lines:   Peripheral IV 01/11/24 Distal;Right Forearm (Active)   Site Assessment Clean, dry & intact 01/11/24 1017   Line Status Blood return noted 01/11/24 1017   Line Care Cap changed 01/11/24 1017   Phlebitis Assessment No symptoms 01/11/24 1017   Infiltration Assessment 0 01/11/24 1017   Dressing Status New dressing applied 01/11/24 1017   Dressing Type Transparent 01/11/24 1017   Dressing Intervention New 01/11/24 1017        Opportunity for questions and clarification was provided.      Patient transported with:  Nile Burger, JUSTIN  01/11/24 0403

## 2024-01-11 NOTE — ED PROVIDER NOTES
Emergency Department Provider Note       PCP: Ronit Neff, APRN - NP   Age: 88 y.o.   Sex: female     DISPOSITION Decision To Admit 01/11/2024 11:30:26 AM       ICD-10-CM    1. Urinary tract infection with hematuria, site unspecified  N39.0     R31.9       2. GI (acute kidney injury) (HCC)  N17.9       3. Pressure injury of sacral region, stage 2 (HCC)  L89.152       4. Other hydronephrosis  N13.39           Medical Decision Making     Complexity of Problems Addressed:  1 or more chronic illnesses with a severe exacerbation or progression.  Due to presentation with tachycardia and history of urinary tract infection will perform a septic evaluation.  Data Reviewed and Analyzed:   I independently ordered and reviewed each unique test.         I independently ordered and interpreted the ED EKG in the absence of a Cardiologist.      Performed at 0930, sinus tachycardia with frequent PACs and a rate of 113 bpm.  Nonspecific T wave abnormality.  No acute ischemic changes.  Rate:   EKG Interpretation:   ST Segments:       I interpreted the laboratory tests.    Discussion of management or test interpretation.  Patient has apparent acute kidney injury with a creatinine of 2.  Review of lab results from [facility] indicates she had normal BUN/creatinine in early December.  Also white blood cell count of 13,000 and significant evidence of urinary tract infection.  CT reported by radiology is indicative of some hydronephrosis and incidental finding of lesion in the that may represent cancer in the posterior wall of the bladder.  Will refer to hospitalist for admission.      Risk of Complications and/or Morbidity of Patient Management:  Shared medical decision making was utilized in creating the patients health plan today.         Is this patient to be included in the SEP-1 core measure due to severe sepsis or septic shock? No Exclusion criteria - the patient is NOT to be included for SEP-1 Core Measure due to: May have  sterile water 10 mL IV syringe (1,000 mg IntraVENous Given 1/11/24 1025)       New Prescriptions    No medications on file        Past Medical History:   Diagnosis Date    Adverse effect of anesthesia     delayed awakening     Aplastic anemia (Formerly [facility]) 2/10/2017    Asthma     PRN inhaler-last used 3/2022    Atherosclerosis of both carotid arteries     asymptomatic     Bronchogenic cyst 2/10/2017    --DR DWYER    CHF (congestive heart failure) (Formerly [facility]) 2/10/2017    Chronic cough 2/10/2017    Chronic cystitis 2/10/2017    --UROLOGIST - DR GREWAL    Chronic kidney disease, stage 3a (Formerly [facility])     Followed by Dr. Conti and Dr. Endy VALDIVIA (dyspnea on exertion)     Ongoing since COVID diagnosis in January.  Pt states improving, ProAir PRN.     Dyslipidemia     GERD (gastroesophageal reflux disease)     H/O echocardiogram 03/15/2022    EF >65%    History of COVID-19 01/2022    History of MRSA infection     History of pneumonia     WITH MRSA / EMPYEMA /SEPSIS/ DEHYDRATION / GI - RESOLVED WAS IN REHAB FOR A WHILE    Hyperlipidemia 2/10/2017    Hypertension 2/10/2017    Hypothyroidism 2/10/2017    IBS (irritable bowel syndrome) 2/9/2017    --DIVERTICULOSIS - [facility]  - DR JASON العلي    Mediastinal tumor 2/9/2017    --S/P RESECTION  - SEEN CARDIOLOGIST - STRESS TEST - NORMAL 2012    Myelodysplasia (myelodysplastic syndrome) (Formerly [facility]) 2/10/2017    --SEES HEMONC    Nonischemic cardiomyopathy ([facility]ly [facility])     hx-EF has normalized, followed by Dr. Harper     Osteoarthritis     Pulmonary hypertension ([facility])     Followed by Dr. Harper     Recurrent UTI     Followed by Dr. Schumacher     Seasonal allergic conjunctivitis     Takotsubo cardiomyopathy     History         Past Surgical History:   Procedure Laterality Date    CHOLECYSTECTOMY      CYST REMOVAL      MEDIASTINAL TUMOR  - BENIGN    GI      bleeding ulcer repair     HERNIA REPAIR      HIP FRACTURE SURGERY Left 2020    HYSTERECTOMY (CERVIX STATUS UNKNOWN)      OTHER SURGICAL HISTORY

## 2024-01-11 NOTE — CONSULTS
CONSULT                      Date: 1/11/2024        Patient Name: Lillian Martin Bruton     YOB: 1935      Age:  88 y.o.      History of Present Illness     88 y.o.   female  with medical history of MDS and CHF who presented to the ED from rehab on 1/11/2024 with cc of abdominal pain and dysuria.  History obtained from patient and her daughter at bedside.  Patient has had several admissions over the last 6 months for recurrent UTIs.  Most recently, she was admitted to the 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  She was then discharged to rehab but, per daughter, has not not been making any progress over the last month and has continued to clinically decline and had worsening confusion.  In the ED, labs showed sodium 135, creatinine 2.2, WBC 13.7, and hemoglobin 7.1.  UA showed small leukocyte esterase and positive nitrites.  CT showed abnormal density in the posterior bladder causing severe left and moderate right sided hydronephrosis.  Chest x-ray showed bilateral central bronchial wall thickening.  She was started on IV antibiotics, IV fluids, and admitted for further care.     Urology consult for bilateral hydronephrosis and bladder mass. Patient seen at bedside with family present. She reports she is able to void spontaneously however recently has been having suprapubic pain during her void. She also reports nearly constant pain of her bilateral flank/mid back. Pt reports having a persistent UTI since August of last year.     Past Medical History     Past Medical History:   Diagnosis Date    Adverse effect of anesthesia     delayed awakening     Aplastic anemia (Former[facility]) 2/10/2017    Asthma     PRN inhaler-last used 3/2022    Atherosclerosis of both carotid arteries     asymptomatic     Bronchogenic cyst 2/10/2017    --DR DWYER    CHF (congestive heart failure) (Former[facility]) 2/10/2017    Chronic cough 2/10/2017    Chronic  01/11/24 10:01 AM   Result Value Ref Range    WBC, UA >100 0 /hpf    RBC, UA >100 0 /hpf    Epithelial Cells UA 5-10 0 /hpf    BACTERIA, URINE 4+ (H) 0 /hpf    Casts 0 0 /lpf    Crystals 0 0 /LPF    Amorphous Crystal 2+ (H) 0    Mucus, UA 0 0 /lpf    Other observations RESULTS VERIFIED MANUALLY          Imaging/Diagnostics Last 24 Hours     CT ABDOMEN PELVIS RENAL STONE    Result Date: 1/11/2024  CT ABDOMEN AND PELVIS INDICATION: UTI with dysuria, bedsores/ulcerations Multiple axial images were obtained through the abdomen and pelvis without intravenous or oral contrast.  Radiation dose reduction techniques were used for this study:  All CT scans performed at this facility use one or all of the following: Automated exposure control, adjustment of the mA and/or kVp according to patient's size, iterative reconstruction. COMPARISON: None FINDINGS: - KIDNEYS/URETERS: No discrete urinary tract calculi. Severe left and moderate right hydroureteronephrosis without evidence for ureteral calculus. - BLADDER: Abnormal density posterior urinary bladder with curvilinear calcifications and while this is poorly evaluated on this noncontrast exam the primary differential consideration is for an underlying bladder malignancy with rough measurements of 6.9 x 5.5 x 6.3 cm. - LUNG BASES: Bibasilar subsegmental atelectasis. Calcified granulomata in the bilateral lung bases. Calcified right hilar lymph nodes. Extensive coronary artery calcifications. - LIVER: Normal in size and appearance.  - GALLBLADDER/BILE DUCTS: Gallbladder surgically absent. 1.7 cm calcification along the inferior margin of the gallbladder fossa which appears well rounded and circumscribed, and is of uncertain etiology. No biliary ductal dilatation. - PANCREAS: Normal. - SPLEEN: Normal. - ADRENALS: Normal. - REPRODUCTIVE ORGANS: The uterus is surgically absent. The ovaries are not discretely identified. Please note the bladder mass in the vaginal cuff for

## 2024-01-11 NOTE — H&P
tablet 4 mg     ondansetron (ZOFRAN) injection 4 mg    polyethylene glycol (GLYCOLAX) packet 17 g    OR Linked Order Group     acetaminophen (TYLENOL) tablet 650 mg     acetaminophen (TYLENOL) suppository 650 mg       I have personally reviewed labs and tests:  Recent Labs:  Recent Results (from the past 24 hour(s))   EKG 12 Lead    Collection Time: 01/11/24  9:30 AM   Result Value Ref Range    Ventricular Rate 113 BPM    Atrial Rate 104 BPM    P-R Interval 145 ms    QRS Duration 90 ms    Q-T Interval 319 ms    QTc Calculation (Bazett) 438 ms    P Axis 82 degrees    R Axis 61 degrees    T Axis 91 degrees    Diagnosis       Sinus tachycardia with pacs  Nonspecific T abnormalities, lateral leads    Confirmed by Adi Chacon MD ([id number]) on 1/11/2024 10:49:17 AM     Comprehensive Metabolic Panel    Collection Time: 01/11/24 10:01 AM   Result Value Ref Range    Sodium 135 (L) 136 - 146 mmol/L    Potassium 4.1 3.5 - 5.1 mmol/L    Chloride 110 103 - 113 mmol/L    CO2 19 (L) 21 - 32 mmol/L    Anion Gap 6 2 - 11 mmol/L    Glucose 77 65 - 100 mg/dL    BUN 39 (H) 8 - 23 MG/DL    Creatinine 2.20 (H) 0.6 - 1.0 MG/DL    Est, Glom Filt Rate 21 (L) >60 ml/min/1.73m2    Calcium 10.1 8.3 - 10.4 MG/DL    Total Bilirubin 1.2 (H) 0.2 - 1.1 MG/DL    ALT 37 12 - 65 U/L    AST 26 15 - 37 U/L    Alk Phosphatase 97 50 - 136 U/L    Total Protein 7.0 6.3 - 8.2 g/dL    Albumin 2.4 (L) 3.2 - 4.6 g/dL    Globulin 4.6 (H) 2.8 - 4.5 g/dL    Albumin/Globulin Ratio 0.5 0.4 - 1.6     CBC with Auto Differential    Collection Time: 01/11/24 10:01 AM   Result Value Ref Range    WBC 13.7 (H) 4.3 - 11.1 K/uL    RBC 2.28 (L) 4.05 - 5.2 M/uL    Hemoglobin 7.1 (L) 11.7 - 15.4 g/dL    Hematocrit 24.3 (L) 35.8 - 46.3 %    .6 (H) 82 - 102 FL    MCH 31.1 26.1 - 32.9 PG    MCHC 29.2 (L) 31.4 - 35.0 g/dL    RDW 21.9 (H) 11.9 - 14.6 %    Platelets 431 150 - 450 K/uL    MPV 12.8 (H) 9.4 - 12.3 FL    nRBC 0.05 0.0 - 0.2 K/uL    Neutrophils % 65 43 - 78 %  through the abdomen and pelvis without intravenous or oral contrast.  Radiation dose reduction techniques were used for this study:  All CT scans performed at this facility use one or all of the following: Automated exposure control, adjustment of the mA and/or kVp according to patient's size, iterative reconstruction. COMPARISON: None FINDINGS: - KIDNEYS/URETERS: No discrete urinary tract calculi. Severe left and moderate right hydroureteronephrosis without evidence for ureteral calculus. - BLADDER: Abnormal density posterior urinary bladder with curvilinear calcifications and while this is poorly evaluated on this noncontrast exam the primary differential consideration is for an underlying bladder malignancy with rough measurements of 6.9 x 5.5 x 6.3 cm. - LUNG BASES: Bibasilar subsegmental atelectasis. Calcified granulomata in the bilateral lung bases. Calcified right hilar lymph nodes. Extensive coronary artery calcifications. - LIVER: Normal in size and appearance.  - GALLBLADDER/BILE DUCTS: Gallbladder surgically absent. 1.7 cm calcification along the inferior margin of the gallbladder fossa which appears well rounded and circumscribed, and is of uncertain etiology. No biliary ductal dilatation. - PANCREAS: Normal. - SPLEEN: Normal. - ADRENALS: Normal. - REPRODUCTIVE ORGANS: The uterus is surgically absent. The ovaries are not discretely identified. Please note the bladder mass in the vaginal cuff for indistinguishable on this noncontrast exam. - BOWEL: No evidence for obstruction. The appendix is normal. Scattered colonic diverticulosis without evidence of acute diverticulitis. - LYMPH NODES: No discretely enlarged lymphadenopathy. - BONES: No evidence for acute fracture or aggressive bony lesion. Levoscoliosis of the lumbar spine. Multilevel degenerative change throughout the spine. - VASCULATURE: Extensive atherosclerotic changes throughout the visualized vasculature including the aorta. Mild infrarenal

## 2024-01-11 NOTE — ED TRIAGE NOTES
Patient arrives via [facility] from [facility] with CO UTI with dysuria x 1 month, bedsores and decreased appetite.

## 2024-01-12 ENCOUNTER — APPOINTMENT (OUTPATIENT)
Dept: INTERVENTIONAL RADIOLOGY/VASCULAR | Age: 89
DRG: 871 | End: 2024-01-12
Payer: MEDICARE

## 2024-01-12 LAB
ALBUMIN SERPL-MCNC: 1.9 G/DL (ref 3.2–4.6)
ALBUMIN/GLOB SERPL: 0.5 (ref 0.4–1.6)
ALP SERPL-CCNC: 77 U/L (ref 50–136)
ALT SERPL-CCNC: 26 U/L (ref 12–65)
ANION GAP SERPL CALC-SCNC: 7 MMOL/L (ref 2–11)
AST SERPL-CCNC: 20 U/L (ref 15–37)
BASOPHILS # BLD: 0 K/UL (ref 0–0.2)
BASOPHILS NFR BLD: 0 % (ref 0–2)
BILIRUB SERPL-MCNC: 0.6 MG/DL (ref 0.2–1.1)
BUN SERPL-MCNC: 36 MG/DL (ref 8–23)
CALCIUM SERPL-MCNC: 9 MG/DL (ref 8.3–10.4)
CHLORIDE SERPL-SCNC: 116 MMOL/L (ref 103–113)
CO2 SERPL-SCNC: 18 MMOL/L (ref 21–32)
CREAT SERPL-MCNC: 2.1 MG/DL (ref 0.6–1)
DIFFERENTIAL METHOD BLD: ABNORMAL
EOSINOPHIL # BLD: 0 K/UL (ref 0–0.8)
EOSINOPHIL NFR BLD: 0 % (ref 0.5–7.8)
ERYTHROCYTE [DISTWIDTH] IN BLOOD BY AUTOMATED COUNT: 22.5 % (ref 11.9–14.6)
FERRITIN SERPL-MCNC: 3134 NG/ML (ref 8–388)
FOLATE SERPL-MCNC: 14.8 NG/ML (ref 3.1–17.5)
GLOBULIN SER CALC-MCNC: 3.9 G/DL (ref 2.8–4.5)
GLUCOSE BLD STRIP.AUTO-MCNC: 142 MG/DL (ref 65–100)
GLUCOSE SERPL-MCNC: 69 MG/DL (ref 65–100)
HCT VFR BLD AUTO: 21.4 % (ref 35.8–46.3)
HGB BLD-MCNC: 6 G/DL (ref 11.7–15.4)
HISTORY CHECK: NORMAL
IMM GRANULOCYTES # BLD AUTO: 1.4 K/UL (ref 0–0.5)
IMM GRANULOCYTES NFR BLD AUTO: 11 % (ref 0–5)
IRON SATN MFR SERPL: 31 %
IRON SERPL-MCNC: 35 UG/DL (ref 35–150)
LYMPHOCYTES # BLD: 2.2 K/UL (ref 0.5–4.6)
LYMPHOCYTES NFR BLD: 17 % (ref 13–44)
MAGNESIUM SERPL-MCNC: 1.6 MG/DL (ref 1.8–2.4)
MCH RBC QN AUTO: 31.3 PG (ref 26.1–32.9)
MCHC RBC AUTO-ENTMCNC: 28 G/DL (ref 31.4–35)
MCV RBC AUTO: 111.5 FL (ref 82–102)
MONOCYTES # BLD: 0.8 K/UL (ref 0.1–1.3)
MONOCYTES NFR BLD: 6 % (ref 4–12)
NEUTS SEG # BLD: 8.5 K/UL (ref 1.7–8.2)
NEUTS SEG NFR BLD: 66 % (ref 43–78)
NRBC # BLD: 0.06 K/UL (ref 0–0.2)
PLATELET # BLD AUTO: 374 K/UL (ref 150–450)
PLATELET COMMENT: ADEQUATE
PMV BLD AUTO: 12.7 FL (ref 9.4–12.3)
POTASSIUM SERPL-SCNC: 4.5 MMOL/L (ref 3.5–5.1)
PROT SERPL-MCNC: 5.8 G/DL (ref 6.3–8.2)
RBC # BLD AUTO: 1.92 M/UL (ref 4.05–5.2)
RBC MORPH BLD: ABNORMAL
SERVICE CMNT-IMP: ABNORMAL
SODIUM SERPL-SCNC: 141 MMOL/L (ref 136–146)
TIBC SERPL-MCNC: 113 UG/DL (ref 250–450)
VIT B12 SERPL-MCNC: 837 PG/ML (ref 193–986)
WBC # BLD AUTO: 12.9 K/UL (ref 4.3–11.1)
WBC MORPH BLD: ABNORMAL

## 2024-01-12 PROCEDURE — 6360000002 HC RX W HCPCS: Performed by: STUDENT IN AN ORGANIZED HEALTH CARE EDUCATION/TRAINING PROGRAM

## 2024-01-12 PROCEDURE — 86923 COMPATIBILITY TEST ELECTRIC: CPT

## 2024-01-12 PROCEDURE — 2580000003 HC RX 258: Performed by: PHYSICIAN ASSISTANT

## 2024-01-12 PROCEDURE — 99153 MOD SED SAME PHYS/QHP EA: CPT | Performed by: RADIOLOGY

## 2024-01-12 PROCEDURE — 6360000002 HC RX W HCPCS: Performed by: RADIOLOGY

## 2024-01-12 PROCEDURE — 99223 1ST HOSP IP/OBS HIGH 75: CPT | Performed by: NURSE PRACTITIONER

## 2024-01-12 PROCEDURE — 82607 VITAMIN B-12: CPT

## 2024-01-12 PROCEDURE — 86901 BLOOD TYPING SEROLOGIC RH(D): CPT

## 2024-01-12 PROCEDURE — 50432 PLMT NEPHROSTOMY CATHETER: CPT | Performed by: RADIOLOGY

## 2024-01-12 PROCEDURE — 85025 COMPLETE CBC W/AUTO DIFF WBC: CPT

## 2024-01-12 PROCEDURE — C1769 GUIDE WIRE: HCPCS

## 2024-01-12 PROCEDURE — 6370000000 HC RX 637 (ALT 250 FOR IP): Performed by: STUDENT IN AN ORGANIZED HEALTH CARE EDUCATION/TRAINING PROGRAM

## 2024-01-12 PROCEDURE — 1100000000 HC RM PRIVATE

## 2024-01-12 PROCEDURE — 6370000000 HC RX 637 (ALT 250 FOR IP): Performed by: PHYSICIAN ASSISTANT

## 2024-01-12 PROCEDURE — 80053 COMPREHEN METABOLIC PANEL: CPT

## 2024-01-12 PROCEDURE — 0T9430Z DRAINAGE OF LEFT KIDNEY PELVIS WITH DRAINAGE DEVICE, PERCUTANEOUS APPROACH: ICD-10-PCS | Performed by: RADIOLOGY

## 2024-01-12 PROCEDURE — 2500000003 HC RX 250 WO HCPCS: Performed by: RADIOLOGY

## 2024-01-12 PROCEDURE — 2580000003 HC RX 258: Performed by: FAMILY MEDICINE

## 2024-01-12 PROCEDURE — 83735 ASSAY OF MAGNESIUM: CPT

## 2024-01-12 PROCEDURE — 50432 PLMT NEPHROSTOMY CATHETER: CPT

## 2024-01-12 PROCEDURE — 82728 ASSAY OF FERRITIN: CPT

## 2024-01-12 PROCEDURE — 87205 SMEAR GRAM STAIN: CPT

## 2024-01-12 PROCEDURE — 36430 TRANSFUSION BLD/BLD COMPNT: CPT

## 2024-01-12 PROCEDURE — 87070 CULTURE OTHR SPECIMN AEROBIC: CPT

## 2024-01-12 PROCEDURE — 82746 ASSAY OF FOLIC ACID SERUM: CPT

## 2024-01-12 PROCEDURE — 6360000002 HC RX W HCPCS: Performed by: PHYSICIAN ASSISTANT

## 2024-01-12 PROCEDURE — 2580000003 HC RX 258: Performed by: STUDENT IN AN ORGANIZED HEALTH CARE EDUCATION/TRAINING PROGRAM

## 2024-01-12 PROCEDURE — 0T9330Z DRAINAGE OF RIGHT KIDNEY PELVIS WITH DRAINAGE DEVICE, PERCUTANEOUS APPROACH: ICD-10-PCS | Performed by: RADIOLOGY

## 2024-01-12 PROCEDURE — 86850 RBC ANTIBODY SCREEN: CPT

## 2024-01-12 PROCEDURE — 82962 GLUCOSE BLOOD TEST: CPT

## 2024-01-12 PROCEDURE — 30233N1 TRANSFUSION OF NONAUTOLOGOUS RED BLOOD CELLS INTO PERIPHERAL VEIN, PERCUTANEOUS APPROACH: ICD-10-PCS | Performed by: STUDENT IN AN ORGANIZED HEALTH CARE EDUCATION/TRAINING PROGRAM

## 2024-01-12 PROCEDURE — 36415 COLL VENOUS BLD VENIPUNCTURE: CPT

## 2024-01-12 PROCEDURE — 2500000003 HC RX 250 WO HCPCS: Performed by: PHYSICIAN ASSISTANT

## 2024-01-12 PROCEDURE — 99152 MOD SED SAME PHYS/QHP 5/>YRS: CPT | Performed by: RADIOLOGY

## 2024-01-12 PROCEDURE — 99152 MOD SED SAME PHYS/QHP 5/>YRS: CPT

## 2024-01-12 PROCEDURE — 83540 ASSAY OF IRON: CPT

## 2024-01-12 PROCEDURE — 76937 US GUIDE VASCULAR ACCESS: CPT

## 2024-01-12 PROCEDURE — 87106 FUNGI IDENTIFICATION YEAST: CPT

## 2024-01-12 PROCEDURE — 86900 BLOOD TYPING SEROLOGIC ABO: CPT

## 2024-01-12 PROCEDURE — P9016 RBC LEUKOCYTES REDUCED: HCPCS

## 2024-01-12 PROCEDURE — 83550 IRON BINDING TEST: CPT

## 2024-01-12 RX ORDER — SODIUM CHLORIDE 9 MG/ML
INJECTION, SOLUTION INTRAVENOUS PRN
Status: DISCONTINUED | OUTPATIENT
Start: 2024-01-12 | End: 2024-01-13

## 2024-01-12 RX ORDER — MAGNESIUM SULFATE IN WATER 40 MG/ML
2000 INJECTION, SOLUTION INTRAVENOUS ONCE
Status: COMPLETED | OUTPATIENT
Start: 2024-01-12 | End: 2024-01-12

## 2024-01-12 RX ORDER — MIDAZOLAM HYDROCHLORIDE 1 MG/ML
INJECTION INTRAMUSCULAR; INTRAVENOUS PRN
Status: COMPLETED | OUTPATIENT
Start: 2024-01-12 | End: 2024-01-12

## 2024-01-12 RX ORDER — DEXTROSE MONOHYDRATE 100 MG/ML
INJECTION, SOLUTION INTRAVENOUS CONTINUOUS PRN
Status: DISCONTINUED | OUTPATIENT
Start: 2024-01-12 | End: 2024-01-14 | Stop reason: HOSPADM

## 2024-01-12 RX ORDER — IBUPROFEN 600 MG/1
1 TABLET ORAL PRN
Status: DISCONTINUED | OUTPATIENT
Start: 2024-01-12 | End: 2024-01-14 | Stop reason: HOSPADM

## 2024-01-12 RX ORDER — DEXTROSE AND SODIUM CHLORIDE 5; .45 G/100ML; G/100ML
INJECTION, SOLUTION INTRAVENOUS CONTINUOUS
Status: DISCONTINUED | OUTPATIENT
Start: 2024-01-12 | End: 2024-01-13

## 2024-01-12 RX ORDER — LIDOCAINE HYDROCHLORIDE 20 MG/ML
INJECTION, SOLUTION INFILTRATION; PERINEURAL PRN
Status: COMPLETED | OUTPATIENT
Start: 2024-01-12 | End: 2024-01-12

## 2024-01-12 RX ORDER — FENTANYL CITRATE 50 UG/ML
INJECTION, SOLUTION INTRAMUSCULAR; INTRAVENOUS PRN
Status: COMPLETED | OUTPATIENT
Start: 2024-01-12 | End: 2024-01-12

## 2024-01-12 RX ADMIN — SODIUM CHLORIDE, PRESERVATIVE FREE 10 ML: 5 INJECTION INTRAVENOUS at 20:15

## 2024-01-12 RX ADMIN — TUBERCULIN PURIFIED PROTEIN DERIVATIVE 5 UNITS: 5 INJECTION, SOLUTION INTRADERMAL at 15:28

## 2024-01-12 RX ADMIN — MIDAZOLAM 0.5 MG: 1 INJECTION INTRAMUSCULAR; INTRAVENOUS at 09:34

## 2024-01-12 RX ADMIN — MAGNESIUM SULFATE HEPTAHYDRATE 2000 MG: 40 INJECTION, SOLUTION INTRAVENOUS at 14:12

## 2024-01-12 RX ADMIN — VANCOMYCIN HYDROCHLORIDE 1000 MG: 1 INJECTION, POWDER, LYOPHILIZED, FOR SOLUTION INTRAVENOUS at 17:06

## 2024-01-12 RX ADMIN — HYDROCODONE BITARTRATE AND ACETAMINOPHEN 1 TABLET: 5; 325 TABLET ORAL at 12:37

## 2024-01-12 RX ADMIN — WATER 1000 MG: 1 INJECTION INTRAMUSCULAR; INTRAVENOUS; SUBCUTANEOUS at 11:58

## 2024-01-12 RX ADMIN — DEXTROSE MONOHYDRATE 125 ML: 100 INJECTION, SOLUTION INTRAVENOUS at 06:11

## 2024-01-12 RX ADMIN — SODIUM CHLORIDE, PRESERVATIVE FREE 10 ML: 5 INJECTION INTRAVENOUS at 14:13

## 2024-01-12 RX ADMIN — FENTANYL CITRATE 25 MCG: 50 INJECTION, SOLUTION INTRAMUSCULAR; INTRAVENOUS at 09:43

## 2024-01-12 RX ADMIN — FENTANYL CITRATE 25 MCG: 50 INJECTION, SOLUTION INTRAMUSCULAR; INTRAVENOUS at 09:58

## 2024-01-12 RX ADMIN — DEXTROSE AND SODIUM CHLORIDE: 5; 450 INJECTION, SOLUTION INTRAVENOUS at 14:09

## 2024-01-12 RX ADMIN — MIDAZOLAM 0.5 MG: 1 INJECTION INTRAMUSCULAR; INTRAVENOUS at 09:43

## 2024-01-12 RX ADMIN — ACETAMINOPHEN 650 MG: 325 TABLET ORAL at 22:52

## 2024-01-12 RX ADMIN — SODIUM CHLORIDE: 9 INJECTION, SOLUTION INTRAVENOUS at 04:54

## 2024-01-12 RX ADMIN — FENTANYL CITRATE 25 MCG: 50 INJECTION, SOLUTION INTRAMUSCULAR; INTRAVENOUS at 09:34

## 2024-01-12 RX ADMIN — Medication: at 22:40

## 2024-01-12 RX ADMIN — MIDAZOLAM 0.5 MG: 1 INJECTION INTRAMUSCULAR; INTRAVENOUS at 09:58

## 2024-01-12 RX ADMIN — LIDOCAINE HYDROCHLORIDE 10 ML: 20 INJECTION, SOLUTION INFILTRATION; PERINEURAL at 09:46

## 2024-01-12 ASSESSMENT — PAIN SCALES - GENERAL
PAINLEVEL_OUTOF10: 0
PAINLEVEL_OUTOF10: 4
PAINLEVEL_OUTOF10: 8
PAINLEVEL_OUTOF10: 0

## 2024-01-12 ASSESSMENT — PAIN - FUNCTIONAL ASSESSMENT
PAIN_FUNCTIONAL_ASSESSMENT: NONE - DENIES PAIN
PAIN_FUNCTIONAL_ASSESSMENT: PREVENTS OR INTERFERES SOME ACTIVE ACTIVITIES AND ADLS

## 2024-01-12 ASSESSMENT — PAIN DESCRIPTION - PAIN TYPE: TYPE: ACUTE PAIN;SURGICAL PAIN

## 2024-01-12 ASSESSMENT — PAIN DESCRIPTION - LOCATION: LOCATION: FLANK

## 2024-01-12 ASSESSMENT — PAIN DESCRIPTION - ORIENTATION: ORIENTATION: RIGHT;LEFT

## 2024-01-12 ASSESSMENT — PAIN DESCRIPTION - DESCRIPTORS: DESCRIPTORS: SHARP

## 2024-01-12 ASSESSMENT — PAIN DESCRIPTION - ONSET: ONSET: GRADUAL

## 2024-01-12 NOTE — PROGRESS NOTES
Admit Date: 1/11/2024      Subjective:     Lillian Martin Bruton is seen with family at bedside. NPO to IR today for PCN placement.    Objective:     Patient Vitals for the past 8 hrs:   BP Temp Temp src Pulse Resp SpO2 Height Weight   01/12/24 0848 (!) 141/65 98.1 °F (36.7 °C) Infrared (!) 108 16 90 % 1.524 m (5') 42.2 kg (93 lb)   01/12/24 0749 (!) 149/83 98.3 °F (36.8 °C) Oral (!) 107 16 90 % -- --   01/12/24 0329 (!) 147/83 98.7 °F (37.1 °C) Oral (!) 112 17 92 % -- --     No intake/output data recorded.  01/10 1901 - 01/12 0700  In: 990.5 [I.V.:990.5]  Out: 200 [Urine:200]    Physical Exam:  GENERAL ASSESSMENT: alert, oriented to person, place and time, no acute distress and no anxiety, depression or agitation  Chest: normal work of breathing  CVS exam: normal rate, regular rhythm, normal S1, S2, no murmurs, rubs, clicks or gallops.  ABDOMEN: not done  Neurological exam reveals alert, oriented, normal speech, no focal findings or movement disorder noted.  FEMALE GENITOURINARY EXAM: not done  MALE GENITAL EXAM: not done    Data Review   Recent Results (from the past 24 hour(s))   EKG 12 Lead    Collection Time: 01/11/24  9:30 AM   Result Value Ref Range    Ventricular Rate 113 BPM    Atrial Rate 104 BPM    P-R Interval 145 ms    QRS Duration 90 ms    Q-T Interval 319 ms    QTc Calculation (Bazett) 438 ms    P Axis 82 degrees    R Axis 61 degrees    T Axis 91 degrees    Diagnosis       Sinus tachycardia with pacs  Nonspecific T abnormalities, lateral leads    Confirmed by Adi Chacon MD ([id number]) on 1/11/2024 10:49:17 AM     Comprehensive Metabolic Panel    Collection Time: 01/11/24 10:01 AM   Result Value Ref Range    Sodium 135 (L) 136 - 146 mmol/L    Potassium 4.1 3.5 - 5.1 mmol/L    Chloride 110 103 - 113 mmol/L    CO2 19 (L) 21 - 32 mmol/L    Anion Gap 6 2 - 11 mmol/L    Glucose 77 65 - 100 mg/dL    BUN 39 (H) 8 - 23 MG/DL    Creatinine 2.20 (H) 0.6 - 1.0 MG/DL    Est, Glom Filt Rate 21 (L) >60

## 2024-01-12 NOTE — PROGRESS NOTES
TRANSFER - IN REPORT:    Verbal report received from JUSTIN Blackwood on Lillian Martin Bruton  being received from IR for ordered procedure      Report consisted of patient's Situation, Background, Assessment and   Recommendations(SBAR).     Information from the following report(s) Nurse Handoff Report, MAR, and Procedure Verification was reviewed with the receiving nurse.    Opportunity for questions and clarification was provided.      Assessment completed upon patient's arrival to unit and care assumed.

## 2024-01-12 NOTE — CONSULTS
Comprehensive Nutrition Assessment    Type and Reason for Visit: Initial, Consult  General Nutrition Management/other reason (Hospitalists)    Nutrition Recommendations/Plan:   Meals and Snacks:  Diet: Continue current order  Nutrition Supplement Therapy:  Medical food supplement therapy:  Initiate Ensure Enlive three times per day (this provides 350 kcal and 20 grams protein per bottle)     Malnutrition Assessment:  Malnutrition Status: Moderate malnutrition  Context: Chronic Illness  Findings of clinical characteristics of malnutrition:   Energy Intake:  Unable to assess  Weight Loss:  Greater than 20% over 1 year     Body Fat Loss:  Mild body fat loss Triceps, Orbital, Buccal region   Muscle Mass Loss:  Mild muscle mass loss (moderate) Temples (temporalis), Clavicles (pectoralis & deltoids), Hand (interosseous)  Fluid Accumulation:  No significant fluid accumulation     Strength:  Not Performed     Nutrition Assessment:  Nutrition History: Patient states that she thinks she was eating well prior to admission. She does endorse weight loss. When asked about supplement intake she states she has some at home but has not been drinking since recent admission to rehab.          Weight History: 3/2/23 128#, 8/11/23 116#, 10/10/23 115#, 11/16/23 109#  Nutrition Background:   Wound Type:  (healing sacral wound)   PMH significant for MDS, CHF, HLD, CKD, HTN, recurrent UTIs. She presented from rehab abdominal pain and dysuria. She was admitted with severe sepsis and hydronephrosis secondary to bladder mass. She is s/p bilateral nephrostomy tube placement today.   Nutrition Interval:  Patient reclined in bed with daughter at bedside. She thinks she could eat if they would let her. She also promises to try to eat tomorrow. She is receptive to Ensure and states she like any flavor.     Current Nutrition Therapies:  ADULT DIET; Regular    Current Intake:   Average Meal Intake: Unable to assess (limited active diet since  admission)        Anthropometric Measures:  Height: 152.4 cm (5')  Current Body Wt: 42.2 kg (93 lb) (1/11), Weight source: Bed Scale  BMI: 18.2, Underweight (BMI less than 18.5)  Admission Body Weight: 42.2 kg (93 lb) (1/11 bed scale)  Ideal Body Weight (Kg) (Calculated): 45 kg (100 lbs), 93 %  BMI Category Underweight (BMI less than 18.5)  Estimated Daily Nutrient Needs:  Energy (kcal/day): 5974-2163 (30-35 kcal/kg) (Kcal/kg Weight used: 42.2 kg Current  Protein (g/day): 51-59 (1.2-1.4 g/kg) Weight Used: (Current) 42.2 kg  Fluid (ml/day):   (1 ml/kcal)    Nutrition Diagnosis:   Predicted inadequate energy intake related to  (poor appetite) as evidenced by  (reported barrier to PO, wt loss)  Moderate malnutrition related to  (predicted inadequate energy intake and/or catabolic illness) as evidenced by Criteria as identified in malnutrition assessment  Nutrition Interventions:   Food and/or Nutrient Delivery: Continue Current Diet, Start Oral Nutrition Supplement     Coordination of Nutrition Care: Continue to monitor while inpatient      Goals:      Active Goal: PO intake 50% or greater, by next RD assessment       Nutrition Monitoring and Evaluation:      Food/Nutrient Intake Outcomes: Food and Nutrient Intake, Supplement Intake  Physical Signs/Symptoms Outcomes: Meal Time Behavior, Weight    Discharge Planning:    Too soon to determine    MCKAY CELIS RD

## 2024-01-12 NOTE — PROGRESS NOTES
BIRTHDAY  [birth year]        LOCAL    ELISA NICOLE    FULL CODE    NO DIRECTIVES    MY CHART ACTIVE    CHAPLAINS HAVE VISITED WITH PT SINCE 2020

## 2024-01-12 NOTE — PROGRESS NOTES
VANCO DAILY FOLLOW UP RENAL INSUFFICIENCY PATIENT   [facility]   Pharmacy Pharmacokinetic Monitoring Service - Vancomycin    Consulting Provider: SYED Jama   Indication: UTI  Target Concentration: Random level ? 20 mg/L  Day of Therapy: 1  Additional Antimicrobials: ceftriaxone    Patient eligible for piperacillin-tazobactam to cefepime auto-substitution per P&T approved protocol? N/A    Pertinent Laboratory Values:   Wt Readings from Last 1 Encounters:   01/12/24 42.2 kg (93 lb)     Temp Readings from Last 1 Encounters:   01/12/24 98.1 °F (36.7 °C) (Infrared)     Recent Labs     01/11/24  1001 01/12/24  0446   BUN 39* 36*   CREATININE 2.20* 2.10*   WBC 13.7* 12.9*   PROCAL 0.32  --    LACTSEPSIS 1.2  --        No results found for: \"VANCOTROUGH\", \"VANCORANDOM\"    MRSA Nasal Swab: N/A. Non-respiratory infection.    Assessment:  Date:  Dose/Freq Admin Times Level/Time:   1/12 1000 mg x 1 (1130)    1/13   Rd @ 0400                       Plan:  Concentration-guided dosing due to renal impairment  Start vancomycin with 1000 mg x 1   Vancomycin concentrations will be ordered as clinically appropriate   Pharmacy will continue to monitor patient and adjust therapy as indicated    Thank you for the consult,  Vance Ronquillo, PharmD, BCOP  Clinical Pharmacist  Contact [employer]

## 2024-01-12 NOTE — OR NURSING
TRANSFER - OUT REPORT:           Verbal report given to JUSTIN Jimenez(name) on Lillian Martin Bruton  being transferred to  Recovery 4(unit) for  routine post-op            Report consisted of patient’s Situation, Background, Assessment and      Recommendations(SBAR).          Information from the following report(s) SBAR, Procedure Summary, and MAR was reviewed with the receiving nurse.       Opportunity for questions and clarification was provided.          Conscious Sedation:    75 Mcg of Fentanyl administered   1.5 Mg of Versed administered     Pt tolerated procedure well.      Peripheral Intravenous Line:   Peripheral IV 01/12/24 Right;Anterior Forearm (Active)   Site Assessment Clean, dry & intact 01/12/24 0522   Line Status Flushed;Blood return noted;Infusing 01/12/24 0522   Line Care Connections checked and tightened;Tubing changed 01/12/24 0522   Phlebitis Assessment No symptoms 01/12/24 0522   Infiltration Assessment 0 01/12/24 0522   Alcohol Cap Used Yes 01/12/24 0522   Dressing Status Clean, dry & intact;New dressing applied 01/12/24 0522   Dressing Type Transparent 01/12/24 0522       VITALS:  BP (!) 164/69   Pulse 99   Temp 98.1 °F (36.7 °C) (Infrared)   Resp 16   Ht 1.524 m (5')   Wt 42.2 kg (93 lb)   SpO2 92%   BMI 18.16 kg/m²

## 2024-01-12 NOTE — OR NURSING
VSS throughout recovery period. Placed on transport list to be returned to her 5th floor room. Daughter remains at bedside.

## 2024-01-12 NOTE — PROGRESS NOTES
EOS summary:7p-7a  -Pt A&Ox4. Disoriented at times.  -Transitioned from 2 L NC to RA.  -2130: No output from castano noted. Pads under pt wet with urine. Castano advanced and balloon re-inflated. Urine noted in castano tubing and collection bag.  -0305: Minimal urinary output from castano. Neida Mchugh MD notified. No new orders  -Hgb 6.0. MD aware. Consent form signed and placed in chart  0556- Blood sugar 69 with am labs. Neida Mchugh MD notified and hypoglycemic protocol ordered. Bolus given. BG on recheck: 142    BSSR given to oncoming RN    Laurie Moon RN

## 2024-01-12 NOTE — PROGRESS NOTES
TRANSFER - OUT REPORT:           Verbal report given to Primary RN(name) on Lillian Martin Bruton  being transferred to [facility](unit) for  routine progression of patient care            Report consisted of patient’s Situation, Background, Assessment and      Recommendations(SBAR).          Information from the following report(s) SBAR, Procedure Summary, and MAR was reviewed with the receiving nurse.       Opportunity for questions and clarification was provided.          Conscious Sedation:    75 Mcg of Fentanyl administered   1.5 Mg of Versed administered     Pt tolerated procedure well.      Peripheral Intravenous Line:   Peripheral IV 01/12/24 Right;Anterior Forearm (Active)   Site Assessment Clean, dry & intact 01/12/24 0522   Line Status Flushed;Blood return noted;Infusing 01/12/24 0522   Line Care Connections checked and tightened;Tubing changed 01/12/24 0522   Phlebitis Assessment No symptoms 01/12/24 0522   Infiltration Assessment 0 01/12/24 0522   Alcohol Cap Used Yes 01/12/24 0522   Dressing Status Clean, dry & intact;New dressing applied 01/12/24 0522   Dressing Type Transparent 01/12/24 0522       VITALS:  BP (!) 164/69   Pulse 99   Temp 98.1 °F (36.7 °C) (Infrared)   Resp 16   Ht 1.524 m (5')   Wt 42.2 kg (93 lb)   SpO2 96%   BMI 18.16 kg/m²

## 2024-01-12 NOTE — PROGRESS NOTES
US Guided PIV access-    Ultrasound was used to find the vein which was compressible and without any ultrasound features of an artery or nerve bundle. Skin was cleaned and disinfected prior to IV puncture.  Under real-time ultrasound guidance peripheral access was obtained in the right basilic using 22 G 2.5\" B Trotter Deep Access after 1 attempt(s). Blood return was present and IV flushed without difficulty with no clinical signs of infiltration. IV dressing applied and no immediate complications noted. Patient tolerated the procedure well.

## 2024-01-12 NOTE — PROGRESS NOTES
Attempted to see patient this PM for occupational therapy evaluation session. Patient with critically low hgb 6.0. Will follow and re-attempt as schedule permits/patient available. Thank you,    Fauzia Cannon, OT    Rehab Caseload Tracker

## 2024-01-12 NOTE — PROGRESS NOTES
sodium chloride flush 0.9 % injection 5-40 mL  5-40 mL IntraVENous 2 times per day    sodium chloride flush 0.9 % injection 5-40 mL  5-40 mL IntraVENous PRN    0.9 % sodium chloride infusion   IntraVENous PRN    [Held by provider] heparin (porcine) injection 5,000 Units  5,000 Units SubCUTAneous BID    ondansetron (ZOFRAN-ODT) disintegrating tablet 4 mg  4 mg Oral Q8H PRN    Or    ondansetron (ZOFRAN) injection 4 mg  4 mg IntraVENous Q6H PRN    polyethylene glycol (GLYCOLAX) packet 17 g  17 g Oral Daily PRN    acetaminophen (TYLENOL) tablet 650 mg  650 mg Oral Q6H PRN    Or    acetaminophen (TYLENOL) suppository 650 mg  650 mg Rectal Q6H PRN    aspirin EC tablet 81 mg  81 mg Oral Daily    atorvastatin (LIPITOR) tablet 80 mg  80 mg Oral Daily    HYDROcodone-acetaminophen (NORCO) 5-325 MG per tablet 1 tablet  1 tablet Oral Q6H PRN    levothyroxine (SYNTHROID) tablet 25 mcg  25 mcg Oral QAM AC       Signed:  SYED Andujar    Part of this note may have been written by using a voice dictation software.  The note has been proof read but may still contain some grammatical/other typographical errors.

## 2024-01-12 NOTE — PLAN OF CARE
Problem: Pain  Goal: Verbalizes/displays adequate comfort level or baseline comfort level  1/12/2024 0252 by Laurie Moon RN  Outcome: Progressing  1/11/2024 1737 by Laurie Grijalva RN  Outcome: Progressing     Problem: Skin/Tissue Integrity  Goal: Absence of new skin breakdown  Description: 1.  Monitor for areas of redness and/or skin breakdown  2.  Assess vascular access sites hourly  3.  Every 4-6 hours minimum:  Change oxygen saturation probe site  4.  Every 4-6 hours:  If on nasal continuous positive airway pressure, respiratory therapy assess nares and determine need for appliance change or resting period.  1/12/2024 0252 by Laurie Moon RN  Outcome: Progressing  1/11/2024 1737 by Laurie Grijalva RN  Outcome: Progressing     Problem: Safety - Adult  Goal: Free from fall injury  1/12/2024 0252 by Laurie Moon RN  Outcome: Progressing  Flowsheets (Taken 1/11/2024 2015)  Free From Fall Injury: Instruct family/caregiver on patient safety  1/11/2024 1737 by Laurie Grijalva RN  Outcome: Progressing     Problem: ABCDS Injury Assessment  Goal: Absence of physical injury  1/12/2024 0252 by Laurie Moon RN  Outcome: Progressing  1/11/2024 1737 by Laurie Grijalva RN  Outcome: Progressing

## 2024-01-12 NOTE — ACP (ADVANCE CARE PLANNING)
[facility] Hospitalist Service  At the heart of better care     Advance Care Planning   Admit Date:  2024  9:23 AM   Name:  Lillian Martin Bruton   Age:  88 y.o.  Sex:  female  :  1935   MRN:  [id number]   Room:  [facility]/    Lillian Martin Bruton has capacity to make her own decisions:   YES    If pt unable to make decisions, POA/surrogate decision maker:  Adam her daughter    Other people present:   Daughter x2 + DEREK x 2 x niece    Patient / surrogate decision-maker directed code status:  Adam    Other ACP topics discussed, if applicable:   Discussed possibility of hospice or comfort measures.      Patient or surrogate consented to discussion of the current conditions, workup, management plans, prognosis, and the risk for further deterioration.  Time spent: 44 minutes in direct discussion.      Signed:  SYED Andujar

## 2024-01-12 NOTE — PRE SEDATION
Sedation Pre-Procedure Note    Patient Name: Lillian Martin Bruton   YOB: 1935  Room/Bed: [facility]  Medical Record Number: [id number]  Date: 1/12/2024   Time: 9:24 AM       Indication:  Bladder mass    Consent: I have discussed with the patient and/or the patient representative the indication, alternatives, and the possible risks and/or complications of the planned procedure and the anesthesia methods. The patient and/or patient representative appear to understand and agree to proceed.    Vital Signs:   Vitals:    01/12/24 0848   BP: (!) 141/65   Pulse: (!) 108   Resp: 16   Temp: 98.1 °F (36.7 °C)   SpO2: 90%       Past Medical History:   has a past medical history of Adverse effect of anesthesia, Aplastic anemia (HCC), Asthma, Atherosclerosis of both carotid arteries, Bronchogenic cyst, CHF (congestive heart failure) (HCC), Chronic cough, Chronic cystitis, Chronic kidney disease, stage 3a (HCC), VALDIVIA (dyspnea on exertion), Dyslipidemia, GERD (gastroesophageal reflux disease), H/O echocardiogram, History of COVID-19, History of MRSA infection, History of pneumonia, Hyperlipidemia, Hypertension, Hypothyroidism, IBS (irritable bowel syndrome), Mediastinal tumor, Myelodysplasia (myelodysplastic syndrome) (HCC), Nonischemic cardiomyopathy (HCC), Osteoarthritis, Pulmonary hypertension (HCC), Recurrent UTI, Seasonal allergic conjunctivitis, and Takotsubo cardiomyopathy.    Past Surgical History:   has a past surgical history that includes gi; Hip fracture surgery (Left, 2020); Cholecystectomy; Tonsillectomy; Hysterectomy; cyst removal; hernia repair; and other surgical history (Left).    Medications:   Scheduled Meds:    magnesium sulfate  2,000 mg IntraVENous Once    cefTRIAXone (ROCEPHIN) IV  1,000 mg IntraVENous Q24H    sodium chloride flush  5-40 mL IntraVENous 2 times per day    [Held by provider] heparin (porcine)  5,000 Units SubCUTAneous BID    aspirin  81 mg Oral Daily    atorvastatin  80 mg Oral Daily

## 2024-01-12 NOTE — WOUND CARE
Patient had treatment at [facility] recently for scalded skin from staph or drug reaction, several areas healing partial thickness (chest not pictured) Recommend light layer of aquaphor and cover with silicone foam to help prevent friction, aquaphor daily silicone foam can be lifted to apply aquaphor and reapplied, then changed every other day and prn.     Sacral area with 3 partial thickness open areas in a 5x8cm defined area of erythema with some portions blanchable and some non blanchable, unsure if related to the peeling skin from before or pressure, most consistent with pressure as each open area is directly over a bony prominence, recommend silicone foam every other day and prn

## 2024-01-13 PROBLEM — R53.81 DEBILITY: Status: ACTIVE | Noted: 2024-01-01

## 2024-01-13 PROBLEM — C67.8 MALIGNANT NEOPLASM OF OVERLAPPING SITES OF BLADDER (HCC): Status: ACTIVE | Noted: 2024-01-01

## 2024-01-13 LAB
ANION GAP SERPL CALC-SCNC: 8 MMOL/L (ref 2–11)
BUN SERPL-MCNC: 34 MG/DL (ref 8–23)
CALCIUM SERPL-MCNC: 8.7 MG/DL (ref 8.3–10.4)
CHLORIDE SERPL-SCNC: 111 MMOL/L (ref 103–113)
CO2 SERPL-SCNC: 18 MMOL/L (ref 21–32)
CREAT SERPL-MCNC: 1.8 MG/DL (ref 0.6–1)
ERYTHROCYTE [DISTWIDTH] IN BLOOD BY AUTOMATED COUNT: 22.5 % (ref 11.9–14.6)
GLUCOSE SERPL-MCNC: 145 MG/DL (ref 65–100)
HCT VFR BLD AUTO: 21.4 % (ref 35.8–46.3)
HCT VFR BLD AUTO: 32.9 % (ref 35.8–46.3)
HCT VFR BLD AUTO: 34.3 % (ref 35.8–46.3)
HGB BLD-MCNC: 10.4 G/DL (ref 11.7–15.4)
HGB BLD-MCNC: 10.5 G/DL (ref 11.7–15.4)
HGB BLD-MCNC: 6.6 G/DL (ref 11.7–15.4)
HISTORY CHECK: NORMAL
MCH RBC QN AUTO: 30.6 PG (ref 26.1–32.9)
MCHC RBC AUTO-ENTMCNC: 31.9 G/DL (ref 31.4–35)
MCV RBC AUTO: 95.9 FL (ref 82–102)
MM INDURATION, POC: 0 MM (ref 0–5)
NRBC # BLD: 0.12 K/UL (ref 0–0.2)
PLATELET # BLD AUTO: 335 K/UL (ref 150–450)
PMV BLD AUTO: 12.6 FL (ref 9.4–12.3)
POTASSIUM SERPL-SCNC: 4.4 MMOL/L (ref 3.5–5.1)
PPD, POC: NEGATIVE
RBC # BLD AUTO: 3.43 M/UL (ref 4.05–5.2)
SODIUM SERPL-SCNC: 137 MMOL/L (ref 136–146)
VANCOMYCIN SERPL-MCNC: 16.6 UG/ML
WBC # BLD AUTO: 17.8 K/UL (ref 4.3–11.1)

## 2024-01-13 PROCEDURE — 85018 HEMOGLOBIN: CPT

## 2024-01-13 PROCEDURE — 80048 BASIC METABOLIC PNL TOTAL CA: CPT

## 2024-01-13 PROCEDURE — P9016 RBC LEUKOCYTES REDUCED: HCPCS

## 2024-01-13 PROCEDURE — 36430 TRANSFUSION BLD/BLD COMPNT: CPT

## 2024-01-13 PROCEDURE — 6360000002 HC RX W HCPCS: Performed by: STUDENT IN AN ORGANIZED HEALTH CARE EDUCATION/TRAINING PROGRAM

## 2024-01-13 PROCEDURE — APPSS60 APP SPLIT SHARED TIME 46-60 MINUTES: Performed by: NURSE PRACTITIONER

## 2024-01-13 PROCEDURE — 6370000000 HC RX 637 (ALT 250 FOR IP): Performed by: STUDENT IN AN ORGANIZED HEALTH CARE EDUCATION/TRAINING PROGRAM

## 2024-01-13 PROCEDURE — 80202 ASSAY OF VANCOMYCIN: CPT

## 2024-01-13 PROCEDURE — 99223 1ST HOSP IP/OBS HIGH 75: CPT | Performed by: INTERNAL MEDICINE

## 2024-01-13 PROCEDURE — 85014 HEMATOCRIT: CPT

## 2024-01-13 PROCEDURE — 6370000000 HC RX 637 (ALT 250 FOR IP): Performed by: PHYSICIAN ASSISTANT

## 2024-01-13 PROCEDURE — 36415 COLL VENOUS BLD VENIPUNCTURE: CPT

## 2024-01-13 PROCEDURE — 2580000003 HC RX 258: Performed by: PHYSICIAN ASSISTANT

## 2024-01-13 PROCEDURE — 1100000000 HC RM PRIVATE

## 2024-01-13 PROCEDURE — 2500000003 HC RX 250 WO HCPCS: Performed by: PHYSICIAN ASSISTANT

## 2024-01-13 PROCEDURE — 2580000003 HC RX 258: Performed by: STUDENT IN AN ORGANIZED HEALTH CARE EDUCATION/TRAINING PROGRAM

## 2024-01-13 PROCEDURE — 85027 COMPLETE CBC AUTOMATED: CPT

## 2024-01-13 RX ORDER — HYDROMORPHONE HYDROCHLORIDE 1 MG/ML
0.5 INJECTION, SOLUTION INTRAMUSCULAR; INTRAVENOUS; SUBCUTANEOUS ONCE
Status: COMPLETED | OUTPATIENT
Start: 2024-01-13 | End: 2024-01-13

## 2024-01-13 RX ORDER — MORPHINE SULFATE 2 MG/ML
2 INJECTION, SOLUTION INTRAMUSCULAR; INTRAVENOUS ONCE
Status: DISCONTINUED | OUTPATIENT
Start: 2024-01-13 | End: 2024-01-13

## 2024-01-13 RX ORDER — SCOLOPAMINE TRANSDERMAL SYSTEM 1 MG/1
1 PATCH, EXTENDED RELEASE TRANSDERMAL
Status: DISCONTINUED | OUTPATIENT
Start: 2024-01-13 | End: 2024-01-14 | Stop reason: HOSPADM

## 2024-01-13 RX ORDER — LIDOCAINE HYDROCHLORIDE 20 MG/ML
10 SOLUTION OROPHARYNGEAL ONCE
Status: COMPLETED | OUTPATIENT
Start: 2024-01-13 | End: 2024-01-13

## 2024-01-13 RX ORDER — DEXTROSE AND SODIUM CHLORIDE 5; .45 G/100ML; G/100ML
INJECTION, SOLUTION INTRAVENOUS CONTINUOUS
Status: ACTIVE | OUTPATIENT
Start: 2024-01-13 | End: 2024-01-13

## 2024-01-13 RX ORDER — MAGNESIUM SULFATE IN WATER 40 MG/ML
2000 INJECTION, SOLUTION INTRAVENOUS PRN
Status: DISCONTINUED | OUTPATIENT
Start: 2024-01-13 | End: 2024-01-14 | Stop reason: HOSPADM

## 2024-01-13 RX ORDER — POTASSIUM CHLORIDE 7.45 MG/ML
10 INJECTION INTRAVENOUS PRN
Status: DISCONTINUED | OUTPATIENT
Start: 2024-01-13 | End: 2024-01-14 | Stop reason: HOSPADM

## 2024-01-13 RX ORDER — LIDOCAINE HYDROCHLORIDE 20 MG/ML
10 SOLUTION OROPHARYNGEAL EVERY 4 HOURS PRN
Status: DISCONTINUED | OUTPATIENT
Start: 2024-01-13 | End: 2024-01-14 | Stop reason: HOSPADM

## 2024-01-13 RX ORDER — HYDROMORPHONE HYDROCHLORIDE 1 MG/ML
0.5 INJECTION, SOLUTION INTRAMUSCULAR; INTRAVENOUS; SUBCUTANEOUS
Status: DISCONTINUED | OUTPATIENT
Start: 2024-01-13 | End: 2024-01-14

## 2024-01-13 RX ORDER — DIMETHICONE, CAMPHOR (SYNTHETIC), MENTHOL, AND PHENOL 1.1; .5; .625; .5 G/100G; G/100G; G/100G; G/100G
OINTMENT TOPICAL PRN
Status: DISCONTINUED | OUTPATIENT
Start: 2024-01-13 | End: 2024-01-14 | Stop reason: HOSPADM

## 2024-01-13 RX ORDER — SODIUM CHLORIDE 9 MG/ML
INJECTION, SOLUTION INTRAVENOUS PRN
Status: DISCONTINUED | OUTPATIENT
Start: 2024-01-13 | End: 2024-01-13

## 2024-01-13 RX ORDER — POTASSIUM CHLORIDE 20 MEQ/1
40 TABLET, EXTENDED RELEASE ORAL PRN
Status: DISCONTINUED | OUTPATIENT
Start: 2024-01-13 | End: 2024-01-14 | Stop reason: HOSPADM

## 2024-01-13 RX ADMIN — LEVOTHYROXINE SODIUM 25 MCG: 0.05 TABLET ORAL at 05:19

## 2024-01-13 RX ADMIN — ASPIRIN 81 MG: 81 TABLET ORAL at 07:48

## 2024-01-13 RX ADMIN — ATORVASTATIN CALCIUM 80 MG: 80 TABLET, FILM COATED ORAL at 07:48

## 2024-01-13 RX ADMIN — WATER 1000 MG: 1 INJECTION INTRAMUSCULAR; INTRAVENOUS; SUBCUTANEOUS at 08:30

## 2024-01-13 RX ADMIN — HYDROMORPHONE HYDROCHLORIDE 0.5 MG: 1 INJECTION, SOLUTION INTRAMUSCULAR; INTRAVENOUS; SUBCUTANEOUS at 22:17

## 2024-01-13 RX ADMIN — Medication: at 18:28

## 2024-01-13 RX ADMIN — Medication: at 22:10

## 2024-01-13 RX ADMIN — DEXTROSE AND SODIUM CHLORIDE: 5; 450 INJECTION, SOLUTION INTRAVENOUS at 11:14

## 2024-01-13 RX ADMIN — LIDOCAINE HYDROCHLORIDE 10 ML: 20 SOLUTION ORAL at 11:18

## 2024-01-13 RX ADMIN — HYDROMORPHONE HYDROCHLORIDE 0.5 MG: 1 INJECTION, SOLUTION INTRAMUSCULAR; INTRAVENOUS; SUBCUTANEOUS at 18:44

## 2024-01-13 RX ADMIN — VANCOMYCIN HYDROCHLORIDE 750 MG: 750 INJECTION, POWDER, LYOPHILIZED, FOR SOLUTION INTRAVENOUS at 17:30

## 2024-01-13 RX ADMIN — HYDROMORPHONE HYDROCHLORIDE 0.5 MG: 1 INJECTION, SOLUTION INTRAMUSCULAR; INTRAVENOUS; SUBCUTANEOUS at 11:16

## 2024-01-13 RX ADMIN — SODIUM CHLORIDE, PRESERVATIVE FREE 10 ML: 5 INJECTION INTRAVENOUS at 22:22

## 2024-01-13 RX ADMIN — SODIUM CHLORIDE, PRESERVATIVE FREE 10 ML: 5 INJECTION INTRAVENOUS at 07:50

## 2024-01-13 ASSESSMENT — PAIN SCALES - GENERAL
PAINLEVEL_OUTOF10: 10
PAINLEVEL_OUTOF10: 9
PAINLEVEL_OUTOF10: 6

## 2024-01-13 ASSESSMENT — PAIN DESCRIPTION - ORIENTATION
ORIENTATION: LOWER;UPPER;LEFT
ORIENTATION: RIGHT;LEFT
ORIENTATION: LOWER

## 2024-01-13 ASSESSMENT — PAIN DESCRIPTION - LOCATION
LOCATION: BACK;BUTTOCKS;LEG
LOCATION: GENERALIZED
LOCATION: GENERALIZED;BACK

## 2024-01-13 ASSESSMENT — PAIN DESCRIPTION - DESCRIPTORS
DESCRIPTORS: ACHING;THROBBING;STABBING;DISCOMFORT
DESCRIPTORS: ACHING

## 2024-01-13 ASSESSMENT — PAIN - FUNCTIONAL ASSESSMENT
PAIN_FUNCTIONAL_ASSESSMENT: PREVENTS OR INTERFERES SOME ACTIVE ACTIVITIES AND ADLS
PAIN_FUNCTIONAL_ASSESSMENT: ACTIVITIES ARE NOT PREVENTED

## 2024-01-13 NOTE — CONSULTS
[facility]        Inpatient Hematology / Oncology Consult    Reason for Consult:  UTI (urinary tract infection) [N39.0]  GI (acute kidney injury) (HCC) [N17.9]  Other hydronephrosis [N13.39]  Urinary tract infection with hematuria, site unspecified [N39.0, R31.9]  Pressure injury of sacral region, stage 2 (HCC) [L89.152]  Referring Physician:  Luis Felipe Kelly DO    History of Present Illness:  Ms. Bruton is a 88 y.o. female admitted on 1/11/2024. The primary encounter diagnosis was Urinary tract infection with hematuria, site unspecified. Diagnoses of GI (acute kidney injury) (HCC), Pressure injury of sacral region, stage 2 (HCC), Other hydronephrosis, and Bilateral hydronephrosis were also pertinent to this visit..      Her PMH includes CHF.  She is a patient of Dr. 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  She presented to the ED from rehab on 1/11/2024 with cc of abdominal pain and dysuria.  Patient has had several admissions over the last 6 months for recurrent UTIs.  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  She was then discharged to rehab but, per daughter, has not not been making any progress over the last month and has continued to clinically decline and had worsening confusion.  In the ED, labs showed sodium 135, creatinine 2.2, WBC 13.7, and hemoglobin 7.1.  UA showed small leukocyte esterase and positive nitrites.  CT showed abnormal density in the posterior bladder causing severe left and moderate right sided hydronephrosis.  Chest x-ray showed bilateral central bronchial wall thickening.  Ucx with presumed enterococcus, I/S pending.  On CTX.  Uro consulted s/p b/l PCN placement on 1/12 and plans for cysto as OP.  We were consulted for bladder mass.      Allergies   Allergen Reactions    Tramadol Other (See Comments)     Makes patient  over the last 6 months for recurrent UTIs.  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  She was then discharged to rehab but, per daughter, has not not been making any progress over the last month and has continued to clinically decline and had worsening confusion.  In the ED, labs showed sodium 135, creatinine 2.2, WBC 13.7, and hemoglobin 7.1.  UA showed small leukocyte esterase and positive nitrites.  CT showed abnormal density in the posterior bladder causing severe left and moderate right sided hydronephrosis.  Chest x-ray showed bilateral central bronchial wall thickening.  Ucx with presumed enterococcus, I/S pending.  On CTX.  Uro consulted s/p b/l PCN placement on 1/12 and plans for cysto as OP.  We were consulted for bladder mass.      RECOMMENDATIONS:  Bladder mass  - CT showed abnormal density in the posterior bladder causing severe left and moderate right sided hydronephrosis  - s/p b/l PCN placement on 1/12 and Uro with plans for cysto as OP  - discussed standard of care for bladder cancer would include neoadjuvant chemo followed by possible XRT, unlikely pt would be able to tolerate treatment.  Daughter is unsure whether they would even want treatment.  Recommend f/u with pt's oncologist at [facility].    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 Preciado)    Severe Sepsis 2/2 UTI  - Ucx with presumed enterococcus, I/S pending  - on CTX    Goals and plan of care reviewed with the patient.  All questions answered to the best of our ability.  Lab studies and imaging studies were personally reviewed.  Thank you for allowing us to participate in the care of Ms. Bruton. We will sign off; please call with questions.  Ms. Bruton will need to f/u with her oncologist, Dr. Preciado at [facility].         ENIO Arriaga - [facility] Hematology [facility] Oncology  [facility]

## 2024-01-13 NOTE — PROGRESS NOTES
VANCO DAILY FOLLOW UP RENAL INSUFFICIENCY PATIENT   [facility]   Pharmacy Pharmacokinetic Monitoring Service - Vancomycin    Consulting Provider: SYED Jama   Indication: UTI  Target Concentration: Random level ? 20 mg/L  Day of Therapy: 2 of 5  Additional Antimicrobials: N/A    Patient eligible for piperacillin-tazobactam to cefepime auto-substitution per P&T approved protocol? N/A    Pertinent Laboratory Values:   Wt Readings from Last 1 Encounters:   01/12/24 43.5 kg (96 lb)     Temp Readings from Last 1 Encounters:   01/13/24 99.9 °F (37.7 °C) (Oral)     Recent Labs     01/11/24  1001 01/12/24  0446 01/13/24  0904   BUN 39* 36* 34*   CREATININE 2.20* 2.10* 1.80*   WBC 13.7* 12.9* 17.8*   PROCAL 0.32  --   --    LACTSEPSIS 1.2  --   --        Lab Results   Component Value Date/Time    VANCORANDOM 16.6 01/13/2024 01:14 AM       MRSA Nasal Swab: N/A. Non-respiratory infection.    Assessment:  Date:  Dose/Freq Admin Times Level/Time:   1/12 1000 mg x 1 1706    1/13 750 mg X 1 (1700) Rd @ 0114 = 16.6   1/14   R @ (0400) =                  Plan:  Concentration-guided dosing due to renal impairment. Renal function improving, but not yet back to baseline.   Vancomycin random level resulted at 16.6.  Will re-dose with vancomycin 750 mg X 1 at 1700 today.  Repeat vancomycin random level with AM labs tomorrow.   Vancomycin concentrations will be ordered as clinically appropriate   Pharmacy will continue to monitor patient and adjust therapy as indicated    Thank you for the consult,  Conrad Ling [facility]

## 2024-01-13 NOTE — PROGRESS NOTES
Hospitalist Progress Note   Admit Date:  2024  9:23 AM   Name:  Lillian Martin Bruton   Age:  88 y.o.  Sex:  female  :  1935   MRN:  [id number]   Room:  [facility]/    Presenting/Chief Complaint: Wound Check and Dysuria     Reason(s) for Admission: UTI (urinary tract infection) [N39.0]  GI (acute kidney injury) (HCC) [N17.9]  Other hydronephrosis [N13.39]  Urinary tract infection with hematuria, site unspecified [N39.0, R31.9]  Pressure injury of sacral region, stage 2 (HCC) [L89.152]     Hospital Course:   Lillian Martin Bruton is a 88 y.o. female with medical history of MDS and CHF who presented to the ED from rehab on 2024 with cc of abdominal pain and dysuria. Patient has had several admissions over the last 6 months for recurrent UTIs.  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  She was then discharged to rehab but, per daughter, has not not been making any progress over the last month and has continued to clinically decline and had worsening confusion.   She presented with urosepsis, an GI and anemia UA with a hemoglobin that downtrended to 6 necessitating a transfusion of 1 unit of packed red blood cells. CT abdomen and pelvis showed abnormal density/mass in the posterior bladder causing severe left and moderate right sided hydronephrosis.  Chest x-ray showed bilateral central bronchial wall thickening.  She was started on IV antibiotics, IV fluids, and admitted for further care. Urology consult for bilateral hydronephrosis and bladder mass. They recommended IR place bilateral nephrostomy tube placement given bilateral ureteral obstruction in the setting of UTI. This was done.  They also recommended an outpatient cystoscopy to evaluate the bladder mass once infection cleared and to biopsy as deemed appropriate at time of cystoscopy.  Patient tolerated nephrostomy tube placement  but may still contain some grammatical/other typographical errors.

## 2024-01-14 VITALS
SYSTOLIC BLOOD PRESSURE: 145 MMHG | HEIGHT: 60 IN | OXYGEN SATURATION: 88 % | RESPIRATION RATE: 17 BRPM | HEART RATE: 95 BPM | TEMPERATURE: 99.5 F | BODY MASS INDEX: 19.04 KG/M2 | WEIGHT: 97 LBS | DIASTOLIC BLOOD PRESSURE: 69 MMHG

## 2024-01-14 PROBLEM — M19.90 ARTHRITIS: Status: ACTIVE | Noted: 2019-07-31

## 2024-01-14 PROBLEM — F25.0 SCHIZOAFFECTIVE DISORDER, MIXED TYPE (HCC): Status: ACTIVE | Noted: 2023-12-12

## 2024-01-14 PROBLEM — F03.90 UNSPECIFIED DEMENTIA, UNSPECIFIED SEVERITY, WITHOUT BEHAVIORAL DISTURBANCE, PSYCHOTIC DISTURBANCE, MOOD DISTURBANCE, AND ANXIETY (HCC): Status: ACTIVE | Noted: 2023-12-12

## 2024-01-14 PROBLEM — R60.0 LOCALIZED EDEMA: Status: ACTIVE | Noted: 2023-12-12

## 2024-01-14 PROBLEM — I25.10 ATHEROSCLEROTIC HEART DISEASE OF NATIVE CORONARY ARTERY WITHOUT ANGINA PECTORIS: Status: ACTIVE | Noted: 2023-01-01

## 2024-01-14 PROBLEM — E87.6 HYPOKALEMIA: Status: ACTIVE | Noted: 2023-08-24

## 2024-01-14 PROBLEM — N18.4 CKD (CHRONIC KIDNEY DISEASE) STAGE 4, GFR 15-29 ML/MIN (HCC): Status: ACTIVE | Noted: 2023-01-01

## 2024-01-14 PROBLEM — C67.9 UROTHELIAL CARCINOMA OF BLADDER WITH INVASION OF MUSCLE (HCC): Status: ACTIVE | Noted: 2024-01-01

## 2024-01-14 PROBLEM — M48.00 SPINAL STENOSIS: Status: ACTIVE | Noted: 2019-12-12

## 2024-01-14 PROBLEM — K86.2 PANCREAS CYST: Status: ACTIVE | Noted: 2019-09-27

## 2024-01-14 PROBLEM — N30.00 ACUTE CYSTITIS WITHOUT HEMATURIA: Status: ACTIVE | Noted: 2020-06-24

## 2024-01-14 PROBLEM — I77.9 BILATERAL CAROTID ARTERY DISEASE (HCC): Status: ACTIVE | Noted: 2020-10-08

## 2024-01-14 PROBLEM — B86 CRUSTED SCABIES: Status: ACTIVE | Noted: 2023-01-01

## 2024-01-14 LAB
BACTERIA SPEC CULT: ABNORMAL
BACTERIA SPEC CULT: ABNORMAL
MM INDURATION, POC: 0 MM (ref 0–5)
PPD, POC: NEGATIVE
SERVICE CMNT-IMP: ABNORMAL
VANCOMYCIN SERPL-MCNC: 20.6 UG/ML

## 2024-01-14 PROCEDURE — 80202 ASSAY OF VANCOMYCIN: CPT

## 2024-01-14 PROCEDURE — 36415 COLL VENOUS BLD VENIPUNCTURE: CPT

## 2024-01-14 PROCEDURE — 2580000003 HC RX 258: Performed by: STUDENT IN AN ORGANIZED HEALTH CARE EDUCATION/TRAINING PROGRAM

## 2024-01-14 PROCEDURE — 2500000003 HC RX 250 WO HCPCS: Performed by: PHYSICIAN ASSISTANT

## 2024-01-14 RX ORDER — SCOLOPAMINE TRANSDERMAL SYSTEM 1 MG/1
1 PATCH, EXTENDED RELEASE TRANSDERMAL
Qty: 1 PATCH | Refills: 0
Start: 2024-01-14 | End: 2024-01-28

## 2024-01-14 RX ORDER — HYDROMORPHONE HYDROCHLORIDE 1 MG/ML
0.5 INJECTION, SOLUTION INTRAMUSCULAR; INTRAVENOUS; SUBCUTANEOUS
Qty: 6 ML | Refills: 0 | Status: SHIPPED | OUTPATIENT
Start: 2024-01-14 | End: 2024-01-17

## 2024-01-14 RX ORDER — DIPHENHYDRAMINE HYDROCHLORIDE 50 MG/ML
25 INJECTION INTRAMUSCULAR; INTRAVENOUS EVERY 6 HOURS PRN
Qty: 6 ML | Refills: 0
Start: 2024-01-14 | End: 2024-01-17

## 2024-01-14 RX ORDER — HYDROMORPHONE HYDROCHLORIDE 1 MG/ML
1 INJECTION, SOLUTION INTRAMUSCULAR; INTRAVENOUS; SUBCUTANEOUS EVERY 4 HOURS PRN
Status: DISCONTINUED | OUTPATIENT
Start: 2024-01-14 | End: 2024-01-14 | Stop reason: HOSPADM

## 2024-01-14 RX ORDER — HYDROMORPHONE HYDROCHLORIDE 1 MG/ML
1 INJECTION, SOLUTION INTRAMUSCULAR; INTRAVENOUS; SUBCUTANEOUS EVERY 4 HOURS PRN
Qty: 3 ML | Refills: 0 | Status: SHIPPED | OUTPATIENT
Start: 2024-01-14 | End: 2024-01-17

## 2024-01-14 RX ORDER — DIPHENHYDRAMINE HYDROCHLORIDE 50 MG/ML
25 INJECTION INTRAMUSCULAR; INTRAVENOUS EVERY 6 HOURS PRN
Status: DISCONTINUED | OUTPATIENT
Start: 2024-01-14 | End: 2024-01-14 | Stop reason: HOSPADM

## 2024-01-14 RX ORDER — ONDANSETRON 2 MG/ML
4 INJECTION INTRAMUSCULAR; INTRAVENOUS EVERY 6 HOURS PRN
Qty: 84 ML | Refills: 0
Start: 2024-01-14 | End: 2024-01-17

## 2024-01-14 RX ORDER — HYDROMORPHONE HYDROCHLORIDE 1 MG/ML
0.5 INJECTION, SOLUTION INTRAMUSCULAR; INTRAVENOUS; SUBCUTANEOUS
Status: DISCONTINUED | OUTPATIENT
Start: 2024-01-14 | End: 2024-01-14 | Stop reason: HOSPADM

## 2024-01-14 RX ORDER — LIDOCAINE HYDROCHLORIDE 20 MG/ML
10 SOLUTION OROPHARYNGEAL EVERY 4 HOURS PRN
Qty: 100 ML | Refills: 0
Start: 2024-01-14 | End: 2024-01-17

## 2024-01-14 RX ADMIN — HYDROMORPHONE HYDROCHLORIDE 0.5 MG: 1 INJECTION, SOLUTION INTRAMUSCULAR; INTRAVENOUS; SUBCUTANEOUS at 03:19

## 2024-01-14 RX ADMIN — SODIUM CHLORIDE, PRESERVATIVE FREE 10 ML: 5 INJECTION INTRAVENOUS at 08:18

## 2024-01-14 RX ADMIN — HYDROMORPHONE HYDROCHLORIDE 1 MG: 1 INJECTION, SOLUTION INTRAMUSCULAR; INTRAVENOUS; SUBCUTANEOUS at 15:09

## 2024-01-14 RX ADMIN — HYDROMORPHONE HYDROCHLORIDE 1 MG: 1 INJECTION, SOLUTION INTRAMUSCULAR; INTRAVENOUS; SUBCUTANEOUS at 08:04

## 2024-01-14 ASSESSMENT — PAIN SCALES - GENERAL
PAINLEVEL_OUTOF10: 9
PAINLEVEL_OUTOF10: 10

## 2024-01-14 ASSESSMENT — PAIN DESCRIPTION - ORIENTATION
ORIENTATION: RIGHT;LEFT;LOWER
ORIENTATION: LOWER

## 2024-01-14 ASSESSMENT — PAIN DESCRIPTION - DESCRIPTORS: DESCRIPTORS: ACHING;SPASM;SHARP;SHOOTING

## 2024-01-14 ASSESSMENT — PAIN DESCRIPTION - LOCATION
LOCATION: BUTTOCKS;LEG
LOCATION: BACK;SHOULDER;SACRUM

## 2024-01-14 ASSESSMENT — PAIN - FUNCTIONAL ASSESSMENT
PAIN_FUNCTIONAL_ASSESSMENT: ACTIVITIES ARE NOT PREVENTED
PAIN_FUNCTIONAL_ASSESSMENT: ACTIVITIES ARE NOT PREVENTED

## 2024-01-14 NOTE — PROGRESS NOTES
[facility]      Evaluation date and time      Location and Persons present @ time of eval Edgar present in person, and Adam present via phone.   Patient cognition A & O x 2, pt is not verbal. Will mouth some words, but basically nonverbal.   History provided by Family, son Edgar and daughter Adam   Living arrangements Patient was living at [facility] before going to [facility] for rehab.    Primary Caregiver Son Rodney Bruton   Support systems Edgar and 5 other siblings   Patient's Health Care Decision Maker  Son Rodney Bruton   Family willing to assist with home care needs Patient going to [facility]   Brief synopsis of pt. background Frequent UTIs and hospitalized x 6 in 2023.   History of Myelodysplastic Syndrome, and now Bladder Cancer   Why Hospice and Why now? *Hospice philosophy and comfort care discussion occurred with russell Hernández, logan Medina and 4 other siblilngs.  Discussion of CPR, hospitalization, and other life sustaining preference occurred.     LCD/Prognosis Indicators Metastatic Urothelial Bladder Cancer   Level of Hospice Care and why GIP vs. RHC GIP due to intractable pain and metabolic encephalopathy     Plan to manage symptoms and goal to transition to RHC Symptoms will be evaluated daily for continued eligibility    Wounds, Lines, Drains and Airway    [facility] pt. symptoms at DC, Rxs, Enclara, Local Fill     Mobility/Fall risk    Infection Control    Code Status DNR signed    Primary Payor Source  MCR   Consents signed by    Service Address    Emergency Contact address and phone    Durable Medical Equipment     Spiritual Needs Patient's [employer]ism affiliation is [employer]  , no concerns voiced out. Hospice Chaplain services welcome     Psychosocial /Family Information  There are 6 children.      Inpatient Financial Agreement Yes   TB screen tool Yes score 0    Security code [phone]   Report Nurse Phone number  Tremaine [phone]     Comments:    Plan:     Follow up:     Thank you for the

## 2024-01-14 NOTE — PROGRESS NOTES
[facility]     Referral received and discussed with Franca HARTLEY. Hospice Liaison will contact patient/family for hospice presentation and evaluation.     Thank you for this referral.     Amanda Quijano RN  Hospice Nurse Liaison  [phone]: C  [phone]: O

## 2024-01-14 NOTE — CARE COORDINATION
MSN CM:  patient now comfort measures.  [facility] consulted.  Will await their recommendations.  Case Management will continue to follow.

## 2024-01-14 NOTE — DISCHARGE SUMMARY
Hospitalist Discharge Summary   Admit Date:  2024  9:23 AM   DC Note date: 2024  Name:  Lillian Martin Bruton   Age:  88 y.o.  Sex:  female  :  1935   MRN:  [id number]   Room:  [facility]  PCP:  Ronit Neff APRN - NP    Presenting Complaint: Wound Check and Dysuria     Initial Admission Diagnosis: UTI (urinary tract infection) [N39.0]  GI (acute kidney injury) (HCC) [N17.9]  Other hydronephrosis [N13.39]  Urinary tract infection with hematuria, site unspecified [N39.0, R31.9]  Pressure injury of sacral region, stage 2 (HCC) [L89.152]     Problem List for this Hospitalization (present on admission):    Principal Problem:    Severe sepsis with acute organ dysfunction (HCC)  Active Problems:    CKD (chronic kidney disease)    Hypothyroidism    IBS (irritable bowel syndrome)    MDS (myelodysplastic syndrome) (HCC)    Chronic diastolic congestive heart failure (HCC)    Hyperlipidemia    Hypertension    CKD (chronic kidney disease) stage 3, GFR 30-59 ml/min (HCC)    Pulmonary HTN (HCC)    UTI (urinary tract infection)    Other hydronephrosis    Bladder mass    GI (acute kidney injury) (HCC)    Acute metabolic encephalopathy    Staphylococcal scalded skin syndrome    Moderate protein-calorie malnutrition (HCC)    Malignant neoplasm of overlapping sites of bladder (HCC)    Debility  Resolved Problems:    * No resolved hospital problems. *      Hospital Course:  Lillian Martin Bruton is a 88 y.o. female with medical history of MDS and CHF who presented to the ED from rehab on 2024 with cc of abdominal pain and dysuria. Patient has had several admissions over the last 6 months for recurrent UTIs.  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  She was then discharged to rehab but, per daughter, has not not been making any progress over the last month and has continued to clinically decline and

## 2024-01-14 NOTE — PROGRESS NOTES
Liaison met with son Edgar at bedside  and daughter Adam via phone to discuss hospice philosophy of care, hospice team members and frequency of visits. We also discussed the [facility] for general inpatient care with symptom management and respite care. Answered all questions.     Thank you for the referral to [facility].  Liaison will contact hospice medical director for hospice eligibility.    Amanda Quijano RN  Hospice Nurse Liaison  [phone]: C  [phone]: O

## 2024-01-14 NOTE — PROGRESS NOTES
well.  Her urine culture was growing greater than summative Enterococcus species so antibiotics were broadened to Rocephin and vancomycin.  She continued to have fevers.  She remained very somnolent and weak appearing.  I had long conversations about goals of care with patient and numerous family members including her healthcare power of .  It was agreed that she should be a DNR on January 12, 2024.  I also explained the complexities of a bladder mass and what that would indicate in the future if it was cancer including aggressive surgery as well as adjuvant chemotherapy and radiation.  The family requested to speak to oncology to setting essentially the same thing.  After this conversation (1/13/23) they elected to proceed hospice and comfort care.  I explained exactly what that meant including de-escalation of medications.  I said we will palliate her with 3 days of antibiotics to help downtrend her fever curve and this was agreed upon.  All other medications would be stopped.  She could have a liberalized diet but no food or fluid should be forced upon her.  She is in significant pain all over her body, primarily at the site of her nephrostomy tubes.  She will be treated with IV dilaudid.  She is not displaying any signs of anxiety or agitation but as needed Ativan is available for her as well.  I have consulted hospice as family request that she be discharged to a facility where hospice can be administered.    1/14/23: itching on her back and complaining of dysuria. Sleeping with family at bedside. She pulled off her o2.     Assessment & Plan:       Severe sepsis (from UTI) with acute organ dysfunction (GI), bladder mass concerning for malignancy, MDS, moderate protein calorie malnutrition and severe debility/critical illness myopathy   - comfort care initiated 1/13/24  - hospice consulted - liaison is seeing if she qualifies for inpt hospice. She is requiring IV dilaudid around the clock  - Continue  (LL) 11.7 - 15.4 g/dL    Hematocrit 21.4 (L) 35.8 - 46.3 %   PREPARE RBC (CROSSMATCH), 1 Units    Collection Time: 01/13/24  2:00 AM   Result Value Ref Range    History Check Historical check performed    Hemoglobin and Hematocrit    Collection Time: 01/13/24  6:20 AM   Result Value Ref Range    Hemoglobin 10.4 (L) 11.7 - 15.4 g/dL    Hematocrit 34.3 (L) 35.8 - 46.3 %   CBC    Collection Time: 01/13/24  9:04 AM   Result Value Ref Range    WBC 17.8 (H) 4.3 - 11.1 K/uL    RBC 3.43 (L) 4.05 - 5.2 M/uL    Hemoglobin 10.5 (L) 11.7 - 15.4 g/dL    Hematocrit 32.9 (L) 35.8 - 46.3 %    MCV 95.9 82 - 102 FL    MCH 30.6 26.1 - 32.9 PG    MCHC 31.9 31.4 - 35.0 g/dL    RDW 22.5 (H) 11.9 - 14.6 %    Platelets 335 150 - 450 K/uL    MPV 12.6 (H) 9.4 - 12.3 FL    nRBC 0.12 0.0 - 0.2 K/uL   Basic Metabolic Panel w/ Reflex to MG    Collection Time: 01/13/24  9:04 AM   Result Value Ref Range    Sodium 137 136 - 146 mmol/L    Potassium 4.4 3.5 - 5.1 mmol/L    Chloride 111 103 - 113 mmol/L    CO2 18 (L) 21 - 32 mmol/L    Anion Gap 8 2 - 11 mmol/L    Glucose 145 (H) 65 - 100 mg/dL    BUN 34 (H) 8 - 23 MG/DL    Creatinine 1.80 (H) 0.6 - 1.0 MG/DL    Est, Glom Filt Rate 27 (L) >60 ml/min/1.73m2    Calcium 8.7 8.3 - 10.4 MG/DL   PLEASE READ & DOCUMENT PPD TEST IN 24 HRS    Collection Time: 01/13/24  3:34 PM   Result Value Ref Range    PPD, (POC) Negative     mm Induration 0 0 - 5 mm   Vancomycin Level, Random    Collection Time: 01/14/24  5:42 AM   Result Value Ref Range    Vancomycin Rm 20.6 UG/ML       Current Meds:  Current Facility-Administered Medications   Medication Dose Route Frequency    HYDROmorphone HCl PF (DILAUDID) injection 0.5 mg  0.5 mg IntraVENous Q2H PRN    HYDROmorphone HCl PF (DILAUDID) injection 1 mg  1 mg IntraVENous Q4H PRN    diphenhydrAMINE (BENADRYL) injection 25 mg  25 mg IntraVENous Q6H PRN    potassium chloride (KLOR-CON M) extended release tablet 40 mEq  40 mEq Oral PRN    Or    potassium bicarb-citric acid

## 2024-01-15 LAB
ABO + RH BLD: NORMAL
BACTERIA SPEC CULT: ABNORMAL
BACTERIA SPEC CULT: NORMAL
BACTERIA SPEC CULT: NORMAL
BLD PROD TYP BPU: NORMAL
BLD PROD TYP BPU: NORMAL
BLOOD BANK DISPENSE STATUS: NORMAL
BLOOD BANK DISPENSE STATUS: NORMAL
BLOOD GROUP ANTIBODIES SERPL: NORMAL
BPU ID: NORMAL
BPU ID: NORMAL
CROSSMATCH RESULT: NORMAL
CROSSMATCH RESULT: NORMAL
GRAM STN SPEC: ABNORMAL
GRAM STN SPEC: ABNORMAL
GRAM STN SPEC: NORMAL
GRAM STN SPEC: NORMAL
SERVICE CMNT-IMP: ABNORMAL
SERVICE CMNT-IMP: NORMAL
SPECIMEN EXP DATE BLD: NORMAL
UNIT DIVISION: 0
UNIT DIVISION: 0

## 2024-01-16 LAB
BACTERIA SPEC CULT: NORMAL
BACTERIA SPEC CULT: NORMAL
SERVICE CMNT-IMP: NORMAL
SERVICE CMNT-IMP: NORMAL

## 2024-01-25 PROBLEM — Z51.5 HOSPICE CARE: Status: ACTIVE | Noted: 2024-01-01

## (undated) DEVICE — DRAPE C-ARMOUR C-ARM KIT --

## (undated) DEVICE — 7 DAY SILVER-COATED ANTIMICROBIAL BARRIER DRESSING: Brand: ACTICOAT 7  4" X 5"

## (undated) DEVICE — DRAPE SHT 3 QTR PROXIMA 53X77 --

## (undated) DEVICE — AMD ANTIMICROBIAL GAUZE SPONGES,12 PLY USP TYPE VII, 0.2% POLYHEXAMETHYLENE BIGUANIDE HCI (PHMB): Brand: CURITY

## (undated) DEVICE — TFN: Brand: MEDLINE INDUSTRIES, INC.

## (undated) DEVICE — DRAPE,U/SHT,SPLIT,FILM,60X84,STERILE: Brand: MEDLINE

## (undated) DEVICE — SLIM BODY SKIN STAPLER: Brand: APPOSE ULC

## (undated) DEVICE — SHEET, DRAPE, SPLIT, STERILE: Brand: MEDLINE

## (undated) DEVICE — (D)PREP SKN CHLRAPRP APPL 26ML -- CONVERT TO ITEM 371833

## (undated) DEVICE — SOLUTION IV 1000ML 0.9% SOD CHL